# Patient Record
Sex: FEMALE | Race: WHITE | NOT HISPANIC OR LATINO
[De-identification: names, ages, dates, MRNs, and addresses within clinical notes are randomized per-mention and may not be internally consistent; named-entity substitution may affect disease eponyms.]

---

## 2018-07-13 ENCOUNTER — TRANSCRIPTION ENCOUNTER (OUTPATIENT)
Age: 63
End: 2018-07-13

## 2018-07-18 ENCOUNTER — APPOINTMENT (OUTPATIENT)
Dept: FAMILY MEDICINE | Facility: CLINIC | Age: 63
End: 2018-07-18
Payer: COMMERCIAL

## 2018-07-18 ENCOUNTER — NON-APPOINTMENT (OUTPATIENT)
Age: 63
End: 2018-07-18

## 2018-07-18 VITALS
BODY MASS INDEX: 26.7 KG/M2 | HEIGHT: 60 IN | OXYGEN SATURATION: 98 % | SYSTOLIC BLOOD PRESSURE: 180 MMHG | HEART RATE: 89 BPM | WEIGHT: 136 LBS | DIASTOLIC BLOOD PRESSURE: 90 MMHG

## 2018-07-18 VITALS — RESPIRATION RATE: 16 BRPM | SYSTOLIC BLOOD PRESSURE: 180 MMHG | DIASTOLIC BLOOD PRESSURE: 80 MMHG

## 2018-07-18 DIAGNOSIS — Z82.49 FAMILY HISTORY OF ISCHEMIC HEART DISEASE AND OTHER DISEASES OF THE CIRCULATORY SYSTEM: ICD-10-CM

## 2018-07-18 DIAGNOSIS — Z12.11 ENCOUNTER FOR SCREENING FOR MALIGNANT NEOPLASM OF COLON: ICD-10-CM

## 2018-07-18 DIAGNOSIS — Z80.1 FAMILY HISTORY OF MALIGNANT NEOPLASM OF TRACHEA, BRONCHUS AND LUNG: ICD-10-CM

## 2018-07-18 DIAGNOSIS — Z13.0 ENCOUNTER FOR SCREENING FOR OTHER SUSPECTED ENDOCRINE DISORDER: ICD-10-CM

## 2018-07-18 DIAGNOSIS — Z83.3 FAMILY HISTORY OF DIABETES MELLITUS: ICD-10-CM

## 2018-07-18 DIAGNOSIS — Z84.1 FAMILY HISTORY OF DISORDERS OF KIDNEY AND URETER: ICD-10-CM

## 2018-07-18 DIAGNOSIS — E78.1 PURE HYPERGLYCERIDEMIA: ICD-10-CM

## 2018-07-18 DIAGNOSIS — Z13.29 ENCOUNTER FOR SCREENING FOR OTHER SUSPECTED ENDOCRINE DISORDER: ICD-10-CM

## 2018-07-18 DIAGNOSIS — Z13.0 ENCOUNTER FOR SCREENING FOR DISEASES OF THE BLOOD AND BLOOD-FORMING ORGANS AND CERTAIN DISORDERS INVOLVING THE IMMUNE MECHANISM: ICD-10-CM

## 2018-07-18 DIAGNOSIS — E01.0 IODINE-DEFICIENCY RELATED DIFFUSE (ENDEMIC) GOITER: ICD-10-CM

## 2018-07-18 DIAGNOSIS — Z78.9 OTHER SPECIFIED HEALTH STATUS: ICD-10-CM

## 2018-07-18 DIAGNOSIS — Z13.228 ENCOUNTER FOR SCREENING FOR OTHER SUSPECTED ENDOCRINE DISORDER: ICD-10-CM

## 2018-07-18 PROCEDURE — 99406 BEHAV CHNG SMOKING 3-10 MIN: CPT

## 2018-07-18 PROCEDURE — G0444 DEPRESSION SCREEN ANNUAL: CPT

## 2018-07-18 PROCEDURE — 36415 COLL VENOUS BLD VENIPUNCTURE: CPT

## 2018-07-18 PROCEDURE — 99204 OFFICE O/P NEW MOD 45 MIN: CPT | Mod: 25

## 2018-07-18 PROCEDURE — 93000 ELECTROCARDIOGRAM COMPLETE: CPT

## 2018-07-18 NOTE — HEALTH RISK ASSESSMENT
[0] : 2) Feeling down, depressed, or hopeless: Not at all (0) [] : Yes [de-identified] : current smoker, approximately 47 years, currently 1 ppd [de-identified] : occasional alcohol use

## 2018-07-18 NOTE — HISTORY OF PRESENT ILLNESS
[FreeTextEntry1] : Establish care [de-identified] : \par 63 year old female presents as a new patient to establish care. \par \par Went to [facility] on 7/13/18 as she had developed a headache, was found to have elevated BP, 192/98, was sent home to start Benicar 20 mg daily which she has started. Here to establish care, recheck BP. Headache has resolved since yesterday. No change in vision, no chest pain. Has h/o HTN, stopped medications ago as she didn't have insurance.\par \par Reports h/o thyroid nodules, goiter, had been seeing an endocrinologist in the past but wasn't able to follow up due to loss of insurance \par \par Has never had a colonoscopy, hesitant to do so, aware of risks/benefits\par \par Overdue for a mammogram, requests script\par \par Current smoker, approximately 47 year history, 1 ppd\par Aware of risks including risk for lung cancer\par Wants to quit, has tried Wellbutrin but didn't tolerate medication\par Has nicotine patches at home, will look to use\par Interested in lung cancer screening

## 2018-07-18 NOTE — ASSESSMENT
[FreeTextEntry1] : HTN:\par not controlled\par c/w Olmesartan 20 mg (will hold off on increasing to 40 mg at this time as it has only been 6 days since she has been on medication, would wait 2 weeks to increase dose)\par will start low dose Amlodipine 2.5 mg, take as directed\par monitor BP\par see cardiology\par \par Thyromegaly/multiple thyroid nodules:\par check blood work\par check thyroid ultrasound\par see endocrinology\par \par Hypertriglyceridemia:\par reports h/o elevated triglycerides\par will check blood work, reassess \par \par Vitamin D deficiency:\par check blood work\par \par Nicotine Dependence:\par greater than 4 minutes spent discussing smoking cessation\par patient aware of risks\par will try to quit on her own\par agrees for CT lung cancer screening\par \par Health care maintenance:\par check blood work\par see GYN\par go for mammogram\par declines colonoscopy at this time, check fecal globin\par \par return in 1 week for follow up

## 2018-07-18 NOTE — PHYSICAL EXAM
[No Acute Distress] : no acute distress [Well Nourished] : well nourished [Well Developed] : well developed [Well-Appearing] : well-appearing [Normal Sclera/Conjunctiva] : normal sclera/conjunctiva [PERRL] : pupils equal round and reactive to light [Normal Oropharynx] : the oropharynx was normal [Normal TMs] : both tympanic membranes were normal [Normal Appearance] : was normal in appearance [Neck Supple] : was supple [No Respiratory Distress] : no respiratory distress  [Clear to Auscultation] : lungs were clear to auscultation bilaterally [Normal Rate] : normal rate  [Regular Rhythm] : with a regular rhythm [Normal S1, S2] : normal S1 and S2 [No Murmur] : no murmur heard [No Carotid Bruits] : no carotid bruits [Pedal Pulses Present] : the pedal pulses are present [No Edema] : there was no peripheral edema [Soft] : abdomen soft [Non Tender] : non-tender [Normal Bowel Sounds] : normal bowel sounds [Normal Posterior Cervical Nodes] : no posterior cervical lymphadenopathy [Normal Anterior Cervical Nodes] : no anterior cervical lymphadenopathy [No Spinal Tenderness] : no spinal tenderness [Grossly Normal Strength/Tone] : grossly normal strength/tone [No Rash] : no rash [Normal Gait] : normal gait [No Focal Deficits] : no focal deficits [Normal Affect] : the affect was normal [Alert and Oriented x3] : oriented to person, place, and time [Normal Mood] : the mood was normal [de-identified] : thyromegaly, right>left

## 2018-07-18 NOTE — COUNSELING
[ - Annual Depression Screening] : Annual Depression Screening [Tobacco Use Cessation Intermediate Greater Than 3 Minutes Up to 10 Minutes] : Tobacco Use Cessation Intermediate Greater Than 3 Minutes Up to 10 Minutes

## 2018-07-20 LAB
25(OH)D3 SERPL-MCNC: 24.5 NG/ML
ALBUMIN SERPL ELPH-MCNC: 4.5 G/DL
ALP BLD-CCNC: 79 U/L
ALT SERPL-CCNC: 28 U/L
ANION GAP SERPL CALC-SCNC: 15 MMOL/L
APPEARANCE: CLEAR
AST SERPL-CCNC: 28 U/L
BACTERIA: NEGATIVE
BASOPHILS # BLD AUTO: 0.02 K/UL
BASOPHILS NFR BLD AUTO: 0.2 %
BILIRUB SERPL-MCNC: 0.4 MG/DL
BILIRUBIN URINE: NEGATIVE
BLOOD URINE: NEGATIVE
BUN SERPL-MCNC: 11 MG/DL
CALCIUM SERPL-MCNC: 9.5 MG/DL
CHLORIDE SERPL-SCNC: 101 MMOL/L
CHOLEST SERPL-MCNC: 226 MG/DL
CHOLEST/HDLC SERPL: 4.4 RATIO
CO2 SERPL-SCNC: 25 MMOL/L
COLOR: YELLOW
CREAT SERPL-MCNC: 0.68 MG/DL
EOSINOPHIL # BLD AUTO: 0.24 K/UL
EOSINOPHIL NFR BLD AUTO: 2.7 %
FERRITIN SERPL-MCNC: 237 NG/ML
FOLATE SERPL-MCNC: 14.9 NG/ML
GLUCOSE QUALITATIVE U: NEGATIVE MG/DL
GLUCOSE SERPL-MCNC: 99 MG/DL
HBA1C MFR BLD HPLC: 5.9 %
HCT VFR BLD CALC: 47.6 %
HDLC SERPL-MCNC: 51 MG/DL
HGB BLD-MCNC: 15.4 G/DL
HYALINE CASTS: 2 /LPF
IMM GRANULOCYTES NFR BLD AUTO: 0.3 %
IRON SATN MFR SERPL: 32 %
IRON SERPL-MCNC: 104 UG/DL
KETONES URINE: NEGATIVE
LDLC SERPL CALC-MCNC: 137 MG/DL
LEUKOCYTE ESTERASE URINE: NEGATIVE
LYMPHOCYTES # BLD AUTO: 2.27 K/UL
LYMPHOCYTES NFR BLD AUTO: 25.7 %
MAN DIFF?: NORMAL
MCHC RBC-ENTMCNC: 29.8 PG
MCHC RBC-ENTMCNC: 32.4 GM/DL
MCV RBC AUTO: 92.1 FL
MICROSCOPIC-UA: NORMAL
MONOCYTES # BLD AUTO: 0.55 K/UL
MONOCYTES NFR BLD AUTO: 6.2 %
NEUTROPHILS # BLD AUTO: 5.72 K/UL
NEUTROPHILS NFR BLD AUTO: 64.9 %
NITRITE URINE: NEGATIVE
PH URINE: 6.5
PLATELET # BLD AUTO: 281 K/UL
POTASSIUM SERPL-SCNC: 4.4 MMOL/L
PROT SERPL-MCNC: 7.5 G/DL
PROTEIN URINE: NEGATIVE MG/DL
RBC # BLD: 5.17 M/UL
RBC # FLD: 14.6 %
RED BLOOD CELLS URINE: 0 /HPF
SODIUM SERPL-SCNC: 141 MMOL/L
SPECIFIC GRAVITY URINE: 1.01
SQUAMOUS EPITHELIAL CELLS: 0 /HPF
T3FREE SERPL-MCNC: 4.65 PG/ML
T4 FREE SERPL-MCNC: 1.2 NG/DL
THYROGLOB AB SERPL-ACNC: <20 IU/ML
THYROPEROXIDASE AB SERPL IA-ACNC: <10 IU/ML
TIBC SERPL-MCNC: 321 UG/DL
TRIGL SERPL-MCNC: 191 MG/DL
TSH SERPL-ACNC: <0.01 UIU/ML
UIBC SERPL-MCNC: 217 UG/DL
URATE SERPL-MCNC: 5.5 MG/DL
UROBILINOGEN URINE: NEGATIVE MG/DL
VIT B12 SERPL-MCNC: 556 PG/ML
WBC # FLD AUTO: 8.83 K/UL
WHITE BLOOD CELLS URINE: 0 /HPF

## 2018-07-24 ENCOUNTER — APPOINTMENT (OUTPATIENT)
Dept: FAMILY MEDICINE | Facility: CLINIC | Age: 63
End: 2018-07-24
Payer: COMMERCIAL

## 2018-07-24 VITALS
WEIGHT: 136 LBS | OXYGEN SATURATION: 97 % | SYSTOLIC BLOOD PRESSURE: 180 MMHG | BODY MASS INDEX: 26.7 KG/M2 | HEART RATE: 88 BPM | DIASTOLIC BLOOD PRESSURE: 80 MMHG | RESPIRATION RATE: 16 BRPM | HEIGHT: 60 IN

## 2018-07-24 VITALS — DIASTOLIC BLOOD PRESSURE: 80 MMHG | SYSTOLIC BLOOD PRESSURE: 150 MMHG

## 2018-07-24 DIAGNOSIS — E55.9 VITAMIN D DEFICIENCY, UNSPECIFIED: ICD-10-CM

## 2018-07-24 PROCEDURE — 99214 OFFICE O/P EST MOD 30 MIN: CPT

## 2018-07-24 RX ORDER — AMLODIPINE BESYLATE 2.5 MG/1
2.5 TABLET ORAL DAILY
Qty: 30 | Refills: 1 | Status: DISCONTINUED | COMMUNITY
Start: 2018-07-18 | End: 2018-07-24

## 2018-07-24 RX ORDER — OLMESARTAN MEDOXOMIL 20 MG/1
20 TABLET, FILM COATED ORAL
Refills: 0 | Status: DISCONTINUED | COMMUNITY
End: 2018-07-24

## 2018-07-24 NOTE — HISTORY OF PRESENT ILLNESS
[FreeTextEntry1] : Follow up [de-identified] : \par 63 year old female presents for follow up\par \par Has HTN, on Olmesartan 20 mg daily\par Tried Amlodipine for 3 days, didn't tolerate it, felt anxious with the medication, developed chest tightness\par reports symptoms resolved after she stopped taking the Amlodipine\par No longer with headaches, feeling better\par Has appointment with cardiology tomorrow\par \par Will be trying to quit smoking, has bought Nicorette gum\par \par Has scheduled mammogram, CT lung cancer screening

## 2018-07-24 NOTE — PHYSICAL EXAM
[No Acute Distress] : no acute distress [Well Nourished] : well nourished [Well Developed] : well developed [Well-Appearing] : well-appearing [Normal Appearance] : was normal in appearance [Neck Supple] : was supple [No Respiratory Distress] : no respiratory distress  [Clear to Auscultation] : lungs were clear to auscultation bilaterally [Normal Rhythm/Effort] : normal respiratory rhythm and effort [Normal Rate] : normal rate  [Regular Rhythm] : with a regular rhythm [Normal S1, S2] : normal S1 and S2 [No Murmur] : no murmur heard [No Edema] : there was no peripheral edema [Normal Anterior Cervical Nodes] : no anterior cervical lymphadenopathy [Alert and Oriented x3] : oriented to person, place, and time [Normal Oropharynx] : the oropharynx was normal [Normal Gait] : normal gait

## 2018-07-24 NOTE — REVIEW OF SYSTEMS
[Fever] : no fever [Chills] : no chills [Chest Pain] : no chest pain [Shortness Of Breath] : no shortness of breath [Cough] : no cough [Headache] : no headache

## 2018-07-24 NOTE — ASSESSMENT
[FreeTextEntry1] : HTN:\par BP improved upon recheck\par will increase Olmesartan to 40 mg daily\par has upcoming cardiology evaluation \par \par Hyperthyroidism:\par see endocrinology\par \par Hyperlipidemia:\par focus on diet and exercise as tolerated\par monitor, if no improvement, would suggest starting medication \par \par Vitamin D deficiency:\par Vitamin D3 1000 units daily\par \par Prediabetes:\par focus on diet and exercise as tolerated\par \par return as needed and follow up in 1 month\par \par

## 2018-07-25 ENCOUNTER — APPOINTMENT (OUTPATIENT)
Dept: CARDIOLOGY | Facility: CLINIC | Age: 63
End: 2018-07-25
Payer: COMMERCIAL

## 2018-07-25 ENCOUNTER — NON-APPOINTMENT (OUTPATIENT)
Age: 63
End: 2018-07-25

## 2018-07-25 VITALS
BODY MASS INDEX: 26.7 KG/M2 | DIASTOLIC BLOOD PRESSURE: 89 MMHG | OXYGEN SATURATION: 97 % | HEART RATE: 90 BPM | SYSTOLIC BLOOD PRESSURE: 157 MMHG | WEIGHT: 136 LBS | HEIGHT: 60 IN

## 2018-07-25 PROCEDURE — 99204 OFFICE O/P NEW MOD 45 MIN: CPT

## 2018-07-25 PROCEDURE — 99406 BEHAV CHNG SMOKING 3-10 MIN: CPT

## 2018-07-25 PROCEDURE — 93000 ELECTROCARDIOGRAM COMPLETE: CPT

## 2018-07-26 ENCOUNTER — FORM ENCOUNTER (OUTPATIENT)
Age: 63
End: 2018-07-26

## 2018-07-27 ENCOUNTER — APPOINTMENT (OUTPATIENT)
Dept: MAMMOGRAPHY | Facility: CLINIC | Age: 63
End: 2018-07-27
Payer: COMMERCIAL

## 2018-07-27 ENCOUNTER — APPOINTMENT (OUTPATIENT)
Dept: CT IMAGING | Facility: CLINIC | Age: 63
End: 2018-07-27
Payer: COMMERCIAL

## 2018-07-27 ENCOUNTER — OUTPATIENT (OUTPATIENT)
Dept: OUTPATIENT SERVICES | Facility: HOSPITAL | Age: 63
LOS: 1 days | End: 2018-07-27
Payer: COMMERCIAL

## 2018-07-27 ENCOUNTER — TRANSCRIPTION ENCOUNTER (OUTPATIENT)
Age: 63
End: 2018-07-27

## 2018-07-27 DIAGNOSIS — Z00.8 ENCOUNTER FOR OTHER GENERAL EXAMINATION: ICD-10-CM

## 2018-07-27 DIAGNOSIS — F17.200 NICOTINE DEPENDENCE, UNSPECIFIED, UNCOMPLICATED: ICD-10-CM

## 2018-07-27 PROCEDURE — 77063 BREAST TOMOSYNTHESIS BI: CPT | Mod: 26

## 2018-07-27 PROCEDURE — 77063 BREAST TOMOSYNTHESIS BI: CPT

## 2018-07-27 PROCEDURE — 77067 SCR MAMMO BI INCL CAD: CPT | Mod: 26

## 2018-07-27 PROCEDURE — G0297: CPT

## 2018-07-27 PROCEDURE — G0297: CPT | Mod: 26

## 2018-07-27 PROCEDURE — 77067 SCR MAMMO BI INCL CAD: CPT

## 2018-08-01 ENCOUNTER — APPOINTMENT (OUTPATIENT)
Dept: CARDIOLOGY | Facility: CLINIC | Age: 63
End: 2018-08-01
Payer: COMMERCIAL

## 2018-08-01 ENCOUNTER — RESULT REVIEW (OUTPATIENT)
Age: 63
End: 2018-08-01

## 2018-08-01 PROCEDURE — 93306 TTE W/DOPPLER COMPLETE: CPT

## 2018-08-15 ENCOUNTER — APPOINTMENT (OUTPATIENT)
Dept: CARDIOLOGY | Facility: CLINIC | Age: 63
End: 2018-08-15

## 2018-08-20 ENCOUNTER — APPOINTMENT (OUTPATIENT)
Dept: CARDIOLOGY | Facility: CLINIC | Age: 63
End: 2018-08-20

## 2019-01-09 ENCOUNTER — TRANSCRIPTION ENCOUNTER (OUTPATIENT)
Age: 64
End: 2019-01-09

## 2019-01-09 ENCOUNTER — RX CHANGE (OUTPATIENT)
Age: 64
End: 2019-01-09

## 2019-01-15 ENCOUNTER — APPOINTMENT (OUTPATIENT)
Dept: FAMILY MEDICINE | Facility: CLINIC | Age: 64
End: 2019-01-15
Payer: MEDICARE

## 2019-01-15 VITALS
OXYGEN SATURATION: 98 % | DIASTOLIC BLOOD PRESSURE: 80 MMHG | SYSTOLIC BLOOD PRESSURE: 162 MMHG | HEART RATE: 86 BPM | HEIGHT: 61 IN | BODY MASS INDEX: 24.92 KG/M2 | WEIGHT: 132 LBS | TEMPERATURE: 98.2 F

## 2019-01-15 VITALS — RESPIRATION RATE: 16 BRPM | SYSTOLIC BLOOD PRESSURE: 158 MMHG | DIASTOLIC BLOOD PRESSURE: 80 MMHG

## 2019-01-15 PROCEDURE — 99214 OFFICE O/P EST MOD 30 MIN: CPT

## 2019-01-15 RX ORDER — OLMESARTAN MEDOXOMIL 20 MG/1
20 TABLET, FILM COATED ORAL
Refills: 0 | Status: COMPLETED | COMMUNITY

## 2019-01-15 NOTE — PHYSICAL EXAM
[No Acute Distress] : no acute distress [Well Nourished] : well nourished [Well Developed] : well developed [Well-Appearing] : well-appearing [Normal Oropharynx] : the oropharynx was normal [Normal TMs] : both tympanic membranes were normal [Normal Appearance] : was normal in appearance [Neck Supple] : was supple [No Respiratory Distress] : no respiratory distress  [Clear to Auscultation] : lungs were clear to auscultation bilaterally [Normal Rate] : normal rate  [Regular Rhythm] : with a regular rhythm [Normal S1, S2] : normal S1 and S2 [No Murmur] : no murmur heard [No Edema] : there was no peripheral edema [Normal Anterior Cervical Nodes] : no anterior cervical lymphadenopathy [Alert and Oriented x3] : oriented to person, place, and time [de-identified] : no sinus tenderness; no TMJ tenderness

## 2019-01-15 NOTE — HISTORY OF PRESENT ILLNESS
[FreeTextEntry1] : Follow up [de-identified] : \par 63 year old female presents for follow up\par \par Has HTN, on Olmesartan 40 mg daily\par Seen by cardiology in July 2018\par Here for BP check\par will be leaving for [place] soon and wanted to be seen prior to leaving\par \par c/o right sided maxillary sinus pressure\par started on [place] Anastasiia and lasted a day, was doing fine until 3 days ago\par Taking Tylenol with some relief \par occasional right ear pain\par no fever\par does admit will be needing dental work

## 2019-01-15 NOTE — ASSESSMENT
[FreeTextEntry1] : BP not controlled\par will look to add Hctz 12.5 mg daily in addition to Olmesartan 40 mg daily\par patient is awaiting to have medical coverage for prescriptions- will be ready by Feb 1, 2019\par will call back at that time and scripts will be sent \par \par right sided sinus pain possibly dental in etiology\par no sign of sinus infection \par to f/u with dentist- patient will look to see one in [place] \par \par Hyperlipidemia- focus on diet and exercise

## 2019-02-01 ENCOUNTER — RX CHANGE (OUTPATIENT)
Age: 64
End: 2019-02-01

## 2019-04-12 ENCOUNTER — TRANSCRIPTION ENCOUNTER (OUTPATIENT)
Age: 64
End: 2019-04-12

## 2019-05-29 ENCOUNTER — RX CHANGE (OUTPATIENT)
Age: 64
End: 2019-05-29

## 2019-05-30 ENCOUNTER — RX RENEWAL (OUTPATIENT)
Age: 64
End: 2019-05-30

## 2019-05-30 ENCOUNTER — TRANSCRIPTION ENCOUNTER (OUTPATIENT)
Age: 64
End: 2019-05-30

## 2019-08-14 ENCOUNTER — TRANSCRIPTION ENCOUNTER (OUTPATIENT)
Age: 64
End: 2019-08-14

## 2019-08-22 ENCOUNTER — APPOINTMENT (OUTPATIENT)
Dept: FAMILY MEDICINE | Facility: CLINIC | Age: 64
End: 2019-08-22
Payer: MEDICARE

## 2019-08-22 VITALS
RESPIRATION RATE: 16 BRPM | SYSTOLIC BLOOD PRESSURE: 160 MMHG | WEIGHT: 132 LBS | OXYGEN SATURATION: 97 % | BODY MASS INDEX: 24.92 KG/M2 | DIASTOLIC BLOOD PRESSURE: 80 MMHG | HEART RATE: 87 BPM | HEIGHT: 61 IN

## 2019-08-22 PROCEDURE — 99213 OFFICE O/P EST LOW 20 MIN: CPT | Mod: 25

## 2019-08-22 PROCEDURE — G0444 DEPRESSION SCREEN ANNUAL: CPT | Mod: 59

## 2019-08-22 NOTE — HISTORY OF PRESENT ILLNESS
[FreeTextEntry1] : htn [de-identified] : history of htn losartan was increased to 100 still not at goal neg headache chest pain palpitatons or dyspnea

## 2019-09-17 ENCOUNTER — APPOINTMENT (OUTPATIENT)
Dept: FAMILY MEDICINE | Facility: CLINIC | Age: 64
End: 2019-09-17

## 2019-11-05 ENCOUNTER — APPOINTMENT (OUTPATIENT)
Dept: FAMILY MEDICINE | Facility: CLINIC | Age: 64
End: 2019-11-05
Payer: MEDICARE

## 2019-11-05 VITALS — SYSTOLIC BLOOD PRESSURE: 130 MMHG | DIASTOLIC BLOOD PRESSURE: 60 MMHG

## 2019-11-05 VITALS
OXYGEN SATURATION: 98 % | HEIGHT: 60 IN | HEART RATE: 93 BPM | DIASTOLIC BLOOD PRESSURE: 82 MMHG | BODY MASS INDEX: 25.72 KG/M2 | SYSTOLIC BLOOD PRESSURE: 130 MMHG | WEIGHT: 131 LBS

## 2019-11-05 DIAGNOSIS — Z12.83 ENCOUNTER FOR SCREENING FOR MALIGNANT NEOPLASM OF SKIN: ICD-10-CM

## 2019-11-05 DIAGNOSIS — Z12.39 ENCOUNTER FOR OTHER SCREENING FOR MALIGNANT NEOPLASM OF BREAST: ICD-10-CM

## 2019-11-05 PROCEDURE — 99406 BEHAV CHNG SMOKING 3-10 MIN: CPT

## 2019-11-05 PROCEDURE — G0402 INITIAL PREVENTIVE EXAM: CPT

## 2019-11-05 PROCEDURE — 99214 OFFICE O/P EST MOD 30 MIN: CPT | Mod: 25

## 2019-11-05 RX ORDER — OLMESARTAN MEDOXOMIL 40 MG/1
40 TABLET, FILM COATED ORAL DAILY
Qty: 90 | Refills: 0 | Status: DISCONTINUED | COMMUNITY
Start: 2018-07-24 | End: 2019-11-05

## 2019-11-05 RX ORDER — LOSARTAN POTASSIUM 100 MG/1
100 TABLET, FILM COATED ORAL DAILY
Qty: 90 | Refills: 0 | Status: DISCONTINUED | COMMUNITY
Start: 2019-05-29 | End: 2019-11-05

## 2019-11-05 NOTE — PHYSICAL EXAM
[No Acute Distress] : no acute distress [Well Nourished] : well nourished [Well Developed] : well developed [Normal Sclera/Conjunctiva] : normal sclera/conjunctiva [PERRL] : pupils equal round and reactive to light [Normal Oropharynx] : the oropharynx was normal [Normal TMs] : both tympanic membranes were normal [Normal Appearance] : was normal in appearance [Neck Supple] : was supple [Enlarged Right] : was enlarged on the right side [No Respiratory Distress] : no respiratory distress  [Clear to Auscultation] : lungs were clear to auscultation bilaterally [Normal Rate] : normal rate  [Regular Rhythm] : with a regular rhythm [Normal S1, S2] : normal S1 and S2 [No Murmur] : no murmur heard [No Carotid Bruits] : no carotid bruits [No Edema] : there was no peripheral edema [Soft] : abdomen soft [Non Tender] : non-tender [Normal Bowel Sounds] : normal bowel sounds [Normal Posterior Cervical Nodes] : no posterior cervical lymphadenopathy [Normal Anterior Cervical Nodes] : no anterior cervical lymphadenopathy [No Spinal Tenderness] : no spinal tenderness [Grossly Normal Strength/Tone] : grossly normal strength/tone [No Rash] : no rash [No Focal Deficits] : no focal deficits [Alert and Oriented x3] : oriented to person, place, and time

## 2019-11-07 NOTE — COUNSELING
[Risk of tobacco use and health benefits of smoking cessation discussed] : Risk of tobacco use and health benefits of smoking cessation discussed [Cessation strategies including cessation program discussed] : Cessation strategies including cessation program discussed [Willing to Quit Smoking] : Willing to quit smoking [Tobacco Use Cessation Intermediate Greater Than 3 Minutes Up to 10 Minutes] : Tobacco Use Cessation Intermediate Greater Than 3 Minutes Up to 10 Minutes [FreeTextEntry1] : 4

## 2019-11-07 NOTE — HISTORY OF PRESENT ILLNESS
[FreeTextEntry1] : Annual physical  [de-identified] : \par 64 year old female presents for annual physical. \par \par Has HTN- on Losartan-Hctz 100-25 mg daily\par \par Has Hyperthyroidism- h/o goiter/thyroid nodules\par did not do updated thyroid ultrasound\par willing to f/u with endocrinology \par \par current smoker- 48 year pack history (48 years x 1 ppd)\par wants to quit- aware of risks\par due for updated CT lung cancer screening\par \par due for mammogram\par \par refuses colonoscopy \par \par declines Flu vaccine\par \par will look to f/u with cardiology

## 2019-11-07 NOTE — HEALTH RISK ASSESSMENT
[] : Yes [30 or more] : 30 or more [Yes] : Yes [Monthly or less (1 pt)] : Monthly or less (1 point) [1 or 2 (0 pts)] : 1 or 2 (0 points) [Never (0 pts)] : Never (0 points) [No] : In the past 12 months have you used drugs other than those required for medical reasons? No [No falls in past year] : Patient reported no falls in the past year [0] : 2) Feeling down, depressed, or hopeless: Not at all (0) [2] : 2 [S] : S [Patient reported mammogram was normal] : Patient reported mammogram was normal [Patient declined colonoscopy] : Patient declined colonoscopy [With Family] : lives with family [Employed] : employed [] :  [Feels Safe at Home] : Feels safe at home [Fully functional (bathing, dressing, toileting, transferring, walking, feeding)] : Fully functional (bathing, dressing, toileting, transferring, walking, feeding) [Fully functional (using the telephone, shopping, preparing meals, housekeeping, doing laundry, using] : Fully functional and needs no help or supervision to perform IADLs (using the telephone, shopping, preparing meals, housekeeping, doing laundry, using transportation, managing medications and managing finances) [Reports normal functional visual acuity (ie: able to read med bottle)] : Reports normal functional visual acuity [Reports changes in dental health] : Reports changes in dental health [Smoke Detector] : smoke detector [Carbon Monoxide Detector] : carbon monoxide detector [Seat Belt] :  uses seat belt [Sunscreen] : uses sunscreen [With Patient/Caregiver] : With Patient/Caregiver [de-identified] : 48 years x 1 pack per day [Audit-CScore] : 1 [de-identified] : active person [de-identified] : diet can be improved [LowDoseCTScan] : 07/18 [Change in mental status noted] : No change in mental status noted [Language] : denies difficulty with language [Behavior] : denies difficulty with behavior [Handling Complex Tasks] : denies difficulty handling complex tasks [Reports changes in hearing] : Reports no changes in hearing [Reports changes in vision] : Reports no changes in vision [MammogramDate] : 07/18 [de-identified] : daughter, son-in-law, grandchildren [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

## 2019-11-07 NOTE — ASSESSMENT
[FreeTextEntry1] : Health maintenance:\par check blood work \par follow up with optometry/ophthalmology and dentist for routine exams\par see GYN\par go for mammogram, bone density \par refuses colonoscopy- check FIT DNA testing (Cologuard)\par c/w diet and exercise as tolerated\par declines Flu vaccine\par \par HTN:\par stable\par c/w Losartan-Hctz\par \par Hyperlipidemia:\par previously not controlled\par c/w diet and exercise\par check blood work\par \par Hyperthyroidism/Multinodular goiter:\par check blood work\par check thyroid ultrasound\par see endocrinology \par \par Prediabetes:\par c/w diet and exercise\par check blood work\par \par Nicotine Dependence:\par 4 minutes spent with patient discussing smoking cessation, patient aware of risks and wants to quit\par trial of Chantix \par go for f/u CT lung cancer screening \par \par advised to f/u with cardiology

## 2019-11-12 VITALS — WEIGHT: 130 LBS | BODY MASS INDEX: 25.52 KG/M2 | HEIGHT: 60 IN

## 2019-11-12 NOTE — PLAN
[FreeTextEntry1] : - Low Dose CT chest for lung cancer screening.\par \par - Encouraged smoking cessation\par \par - Start chantix\par \par Engaged in share decision making with Ms. EPPERSON.  Answered all questions.  She verbalized understanding and agreement.  She knows to call back with any questions or concerns.\par

## 2019-11-12 NOTE — DATA REVIEWED
[Lung Cancer Screening] : Patient underwent lung cancer screening [2] : 2 [S] : S [TextBox_12] : 07/18

## 2019-11-12 NOTE — REASON FOR VISIT
[Annual Follow-Up] : an annual follow-up visit [Low-Dose CT Screening Discussion] : low-dose CT lung cancer screening discussion [Virtual Visit] : virtual visit

## 2019-11-12 NOTE — HISTORY OF PRESENT ILLNESS
[TextBox_13] : Referred by Dr. Tiffanie Patton.\par \par Ms. EPPERSON is a 64 year year old female with a history of HTN and emphysema.\par \par She was called today to review eligibility for Low-Dose CT lung cancer screening.  Reviewed and confirmed that the patient meets screening eligibility criteria:\par \par 64 years old \par \par Smoking Status: Current Smoker\par \par Number of pack(s) per day: 18 cigarettes daily\par Number of years smoked: 48\par Number of pack years smokin\par \par Ms. EPPERSON denies any symptoms of lung cancer, including new cough, change in cough, hemoptysis, and unintentional weight loss.\par \par Ms. EPPERSON denies any personal history of lung cancer.  Admits to lung cancer in two first degree relatives, her mother and brother.  Denies any history of lung disease or any history of occupational exposures.

## 2019-11-14 ENCOUNTER — FORM ENCOUNTER (OUTPATIENT)
Age: 64
End: 2019-11-14

## 2019-11-15 ENCOUNTER — APPOINTMENT (OUTPATIENT)
Dept: CT IMAGING | Facility: CLINIC | Age: 64
End: 2019-11-15
Payer: MEDICARE

## 2019-11-15 ENCOUNTER — APPOINTMENT (OUTPATIENT)
Dept: MAMMOGRAPHY | Facility: CLINIC | Age: 64
End: 2019-11-15
Payer: MEDICARE

## 2019-11-15 ENCOUNTER — OUTPATIENT (OUTPATIENT)
Dept: OUTPATIENT SERVICES | Facility: HOSPITAL | Age: 64
LOS: 1 days | End: 2019-11-15
Payer: MEDICARE

## 2019-11-15 DIAGNOSIS — Z12.39 ENCOUNTER FOR OTHER SCREENING FOR MALIGNANT NEOPLASM OF BREAST: ICD-10-CM

## 2019-11-15 DIAGNOSIS — F17.200 NICOTINE DEPENDENCE, UNSPECIFIED, UNCOMPLICATED: ICD-10-CM

## 2019-11-15 LAB
25(OH)D3 SERPL-MCNC: 39.8 NG/ML
ALBUMIN SERPL ELPH-MCNC: 4.3 G/DL
ALP BLD-CCNC: 77 U/L
ALT SERPL-CCNC: 13 U/L
ANION GAP SERPL CALC-SCNC: 13 MMOL/L
APPEARANCE: CLEAR
AST SERPL-CCNC: 12 U/L
BACTERIA: NEGATIVE
BASOPHILS # BLD AUTO: 0.04 K/UL
BASOPHILS NFR BLD AUTO: 0.5 %
BILIRUB SERPL-MCNC: 0.4 MG/DL
BILIRUBIN URINE: NEGATIVE
BLOOD URINE: NORMAL
BUN SERPL-MCNC: 13 MG/DL
CALCIUM SERPL-MCNC: 9.1 MG/DL
CHLORIDE SERPL-SCNC: 99 MMOL/L
CHOLEST SERPL-MCNC: 213 MG/DL
CHOLEST/HDLC SERPL: 4.4 RATIO
CO2 SERPL-SCNC: 28 MMOL/L
COLOR: YELLOW
CREAT SERPL-MCNC: 0.7 MG/DL
EOSINOPHIL # BLD AUTO: 0.13 K/UL
EOSINOPHIL NFR BLD AUTO: 1.6 %
ESTIMATED AVERAGE GLUCOSE: 126 MG/DL
FOLATE SERPL-MCNC: 14 NG/ML
GLUCOSE QUALITATIVE U: NEGATIVE
GLUCOSE SERPL-MCNC: 108 MG/DL
HBA1C MFR BLD HPLC: 6 %
HCT VFR BLD CALC: 41.8 %
HDLC SERPL-MCNC: 49 MG/DL
HGB BLD-MCNC: 13.6 G/DL
HYALINE CASTS: 0 /LPF
IMM GRANULOCYTES NFR BLD AUTO: 0.3 %
KETONES URINE: NEGATIVE
LDLC SERPL CALC-MCNC: 133 MG/DL
LEUKOCYTE ESTERASE URINE: ABNORMAL
LYMPHOCYTES # BLD AUTO: 2.83 K/UL
LYMPHOCYTES NFR BLD AUTO: 35.9 %
MAN DIFF?: NORMAL
MCHC RBC-ENTMCNC: 29.2 PG
MCHC RBC-ENTMCNC: 32.5 GM/DL
MCV RBC AUTO: 89.9 FL
MICROSCOPIC-UA: NORMAL
MONOCYTES # BLD AUTO: 0.63 K/UL
MONOCYTES NFR BLD AUTO: 8 %
NEUTROPHILS # BLD AUTO: 4.23 K/UL
NEUTROPHILS NFR BLD AUTO: 53.7 %
NITRITE URINE: NEGATIVE
PH URINE: 7
PLATELET # BLD AUTO: 317 K/UL
POTASSIUM SERPL-SCNC: 3.9 MMOL/L
PROT SERPL-MCNC: 6.5 G/DL
PROTEIN URINE: NORMAL
RBC # BLD: 4.65 M/UL
RBC # FLD: 13.5 %
RED BLOOD CELLS URINE: 2 /HPF
SODIUM SERPL-SCNC: 140 MMOL/L
SPECIFIC GRAVITY URINE: 1.02
SQUAMOUS EPITHELIAL CELLS: 9 /HPF
T3FREE SERPL-MCNC: 4.42 PG/ML
T4 FREE SERPL-MCNC: 1.2 NG/DL
TRIGL SERPL-MCNC: 157 MG/DL
TSH SERPL-ACNC: 0.01 UIU/ML
URATE SERPL-MCNC: 6 MG/DL
UROBILINOGEN URINE: ABNORMAL
VIT B12 SERPL-MCNC: 431 PG/ML
WBC # FLD AUTO: 7.88 K/UL
WHITE BLOOD CELLS URINE: 7 /HPF

## 2019-11-15 PROCEDURE — 77063 BREAST TOMOSYNTHESIS BI: CPT | Mod: 26

## 2019-11-15 PROCEDURE — G0297: CPT

## 2019-11-15 PROCEDURE — 77067 SCR MAMMO BI INCL CAD: CPT

## 2019-11-15 PROCEDURE — 77067 SCR MAMMO BI INCL CAD: CPT | Mod: 26

## 2019-11-15 PROCEDURE — 77063 BREAST TOMOSYNTHESIS BI: CPT

## 2019-11-15 PROCEDURE — G0297: CPT | Mod: 26

## 2020-05-08 ENCOUNTER — TRANSCRIPTION ENCOUNTER (OUTPATIENT)
Age: 65
End: 2020-05-08

## 2020-05-13 DIAGNOSIS — R91.8 OTHER NONSPECIFIC ABNORMAL FINDING OF LUNG FIELD: ICD-10-CM

## 2020-05-29 ENCOUNTER — APPOINTMENT (OUTPATIENT)
Dept: CT IMAGING | Facility: CLINIC | Age: 65
End: 2020-05-29
Payer: MEDICARE

## 2020-05-29 ENCOUNTER — OUTPATIENT (OUTPATIENT)
Dept: OUTPATIENT SERVICES | Facility: HOSPITAL | Age: 65
LOS: 1 days | End: 2020-05-29
Payer: MEDICARE

## 2020-05-29 DIAGNOSIS — F17.200 NICOTINE DEPENDENCE, UNSPECIFIED, UNCOMPLICATED: ICD-10-CM

## 2020-05-29 DIAGNOSIS — R91.8 OTHER NONSPECIFIC ABNORMAL FINDING OF LUNG FIELD: ICD-10-CM

## 2020-05-29 PROCEDURE — 71250 CT THORAX DX C-: CPT

## 2020-05-29 PROCEDURE — 71250 CT THORAX DX C-: CPT | Mod: 26

## 2020-11-09 ENCOUNTER — NON-APPOINTMENT (OUTPATIENT)
Age: 65
End: 2020-11-09

## 2020-11-09 ENCOUNTER — APPOINTMENT (OUTPATIENT)
Dept: FAMILY MEDICINE | Facility: CLINIC | Age: 65
End: 2020-11-09
Payer: MEDICARE

## 2020-11-09 VITALS
RESPIRATION RATE: 16 BRPM | DIASTOLIC BLOOD PRESSURE: 80 MMHG | OXYGEN SATURATION: 98 % | TEMPERATURE: 97.5 F | HEART RATE: 88 BPM | BODY MASS INDEX: 25.72 KG/M2 | WEIGHT: 131 LBS | SYSTOLIC BLOOD PRESSURE: 144 MMHG | HEIGHT: 60 IN

## 2020-11-09 VITALS — DIASTOLIC BLOOD PRESSURE: 60 MMHG | SYSTOLIC BLOOD PRESSURE: 140 MMHG

## 2020-11-09 DIAGNOSIS — E04.2 NONTOXIC MULTINODULAR GOITER: ICD-10-CM

## 2020-11-09 DIAGNOSIS — Z20.828 CONTACT WITH AND (SUSPECTED) EXPOSURE TO OTHER VIRAL COMMUNICABLE DISEASES: ICD-10-CM

## 2020-11-09 PROCEDURE — 93000 ELECTROCARDIOGRAM COMPLETE: CPT | Mod: 59

## 2020-11-09 PROCEDURE — 99214 OFFICE O/P EST MOD 30 MIN: CPT | Mod: 25

## 2020-11-09 PROCEDURE — G0438: CPT

## 2020-11-09 PROCEDURE — 99072 ADDL SUPL MATRL&STAF TM PHE: CPT

## 2020-11-09 RX ORDER — VARENICLINE TARTRATE 0.5 (11)-1
0.5 MG X 11 & KIT ORAL
Qty: 1 | Refills: 1 | Status: DISCONTINUED | COMMUNITY
Start: 2019-11-05 | End: 2020-11-09

## 2020-11-09 RX ORDER — ATORVASTATIN CALCIUM 10 MG/1
10 TABLET, FILM COATED ORAL DAILY
Qty: 90 | Refills: 1 | Status: DISCONTINUED | COMMUNITY
Start: 2019-11-15 | End: 2020-11-09

## 2020-11-09 NOTE — ASSESSMENT
[FreeTextEntry1] : Health care maintenance:\par check blood work, blood drawn in office\par follow up with optometry/ophthalmology and dentist for routine exams when due \par follow up with GYN\par go for mammogram, bone density \par refuses colonoscopy- check FIT DNA testing (Cologuard)\par c/w diet and exercise as tolerated\par declines Flu vaccine\par declines Pneumovax\par \par HTN:\par stable\par c/w Losartan-Hctz\par follow up with cardiology \par \par Hyperlipidemia:\par c/w diet and exercise as tolerated\par declines medication \par check blood work\par follow up with cardiology \par \par Hyperthyroidism/Multinodular goiter:\par check blood work\par see endocrinology \par \par Prediabetes:\par c/w diet and exercise as tolerated\par check blood work\par \par Cigarette Nicotine Dependence:\par has cut back on cigarettes\par patient aware of risks- looking to quit on her own\par up to date with CT lung cancer screening \par \par advised to f/u with cardiology

## 2020-11-09 NOTE — HISTORY OF PRESENT ILLNESS
[FreeTextEntry1] : Annual physical  [de-identified] : \par 65 year old female presents for annual physical. \par \par Has HTN- on Losartan-Hctz 100-25 mg daily\par \par Has Hyperlipidemia \par Didn't take Atorvastatin- hesitant to go on medication \par \par Has Hyperthyroidism- h/o goiter/thyroid nodules\par due to see endocrinology\par \par current smoker- 49 years- cut back to 1/2 pack per day (used to smoke 1 pack per day)\par wants to quit- aware of risks\par didn't try Chantix and does not want to \par has Nicorette gum\par up to date with CT lung cancer screening \par \par due for mammogram\par \par refuses colonoscopy \par agrees to Cologuard\par \par declines Flu vaccine\par declines Pneumovax

## 2020-11-09 NOTE — HEALTH RISK ASSESSMENT
[] : Yes [30 or more] : 30 or more [Yes] : Yes [Monthly or less (1 pt)] : Monthly or less (1 point) [1 or 2 (0 pts)] : 1 or 2 (0 points) [Never (0 pts)] : Never (0 points) [No] : In the past 12 months have you used drugs other than those required for medical reasons? No [Patient reported mammogram was normal] : Patient reported mammogram was normal [Patient declined colonoscopy] : Patient declined colonoscopy [No falls in past year] : Patient reported no falls in the past year [Fully functional (bathing, dressing, toileting, transferring, walking, feeding)] : Fully functional (bathing, dressing, toileting, transferring, walking, feeding) [Fully functional (using the telephone, shopping, preparing meals, housekeeping, doing laundry, using] : Fully functional and needs no help or supervision to perform IADLs (using the telephone, shopping, preparing meals, housekeeping, doing laundry, using transportation, managing medications and managing finances) [Smoke Detector] : smoke detector [Carbon Monoxide Detector] : carbon monoxide detector [Seat Belt] :  uses seat belt [Sunscreen] : uses sunscreen [With Patient/Caregiver] : With Patient/Caregiver [With Family] : lives with family [] :  [Audit-CScore] : 1 [de-identified] : walking regularly  [de-identified] : diet is good  [Change in mental status noted] : No change in mental status noted [Reports changes in hearing] : Reports no changes in hearing [Reports changes in vision] : Reports no changes in vision [MammogramDate] : 11/19 [AdvancecareDate] : 11/2020

## 2020-11-11 LAB
25(OH)D3 SERPL-MCNC: 67.5 NG/ML
ALBUMIN SERPL ELPH-MCNC: 4.5 G/DL
ALP BLD-CCNC: 74 U/L
ALT SERPL-CCNC: 15 U/L
ANION GAP SERPL CALC-SCNC: 12 MMOL/L
APPEARANCE: CLEAR
AST SERPL-CCNC: 13 U/L
BACTERIA: NEGATIVE
BASOPHILS # BLD AUTO: 0.04 K/UL
BASOPHILS NFR BLD AUTO: 0.5 %
BILIRUB SERPL-MCNC: 0.5 MG/DL
BILIRUBIN URINE: NEGATIVE
BLOOD URINE: NEGATIVE
BUN SERPL-MCNC: 15 MG/DL
CALCIUM SERPL-MCNC: 9.4 MG/DL
CHLORIDE SERPL-SCNC: 101 MMOL/L
CHOLEST SERPL-MCNC: 204 MG/DL
CO2 SERPL-SCNC: 27 MMOL/L
COLOR: NORMAL
CREAT SERPL-MCNC: 0.68 MG/DL
EOSINOPHIL # BLD AUTO: 0.12 K/UL
EOSINOPHIL NFR BLD AUTO: 1.6 %
ESTIMATED AVERAGE GLUCOSE: 128 MG/DL
FOLATE SERPL-MCNC: 15.6 NG/ML
GLUCOSE QUALITATIVE U: NEGATIVE
GLUCOSE SERPL-MCNC: 105 MG/DL
HBA1C MFR BLD HPLC: 6.1 %
HCT VFR BLD CALC: 42.3 %
HDLC SERPL-MCNC: 55 MG/DL
HGB BLD-MCNC: 13.7 G/DL
HYALINE CASTS: 1 /LPF
IMM GRANULOCYTES NFR BLD AUTO: 0.3 %
KETONES URINE: NEGATIVE
LDLC SERPL CALC-MCNC: 123 MG/DL
LEUKOCYTE ESTERASE URINE: NEGATIVE
LYMPHOCYTES # BLD AUTO: 2.55 K/UL
LYMPHOCYTES NFR BLD AUTO: 34.3 %
MAN DIFF?: NORMAL
MCHC RBC-ENTMCNC: 29.1 PG
MCHC RBC-ENTMCNC: 32.4 GM/DL
MCV RBC AUTO: 89.8 FL
MICROSCOPIC-UA: NORMAL
MONOCYTES # BLD AUTO: 0.56 K/UL
MONOCYTES NFR BLD AUTO: 7.5 %
NEUTROPHILS # BLD AUTO: 4.14 K/UL
NEUTROPHILS NFR BLD AUTO: 55.8 %
NITRITE URINE: NEGATIVE
NONHDLC SERPL-MCNC: 149 MG/DL
PH URINE: 6
PLATELET # BLD AUTO: 293 K/UL
POTASSIUM SERPL-SCNC: 3.9 MMOL/L
PROT SERPL-MCNC: 6.7 G/DL
PROTEIN URINE: NEGATIVE
RBC # BLD: 4.71 M/UL
RBC # FLD: 13.2 %
RED BLOOD CELLS URINE: 1 /HPF
SARS-COV-2 IGG SERPL IA-ACNC: <0.1 INDEX
SARS-COV-2 IGG SERPL QL IA: NEGATIVE
SODIUM SERPL-SCNC: 141 MMOL/L
SPECIFIC GRAVITY URINE: 1.01
SQUAMOUS EPITHELIAL CELLS: 2 /HPF
T4 FREE SERPL-MCNC: 1.4 NG/DL
TRIGL SERPL-MCNC: 132 MG/DL
TSH SERPL-ACNC: <0.01 UIU/ML
URATE SERPL-MCNC: 6 MG/DL
UROBILINOGEN URINE: NORMAL
VIT B12 SERPL-MCNC: 523 PG/ML
WBC # FLD AUTO: 7.43 K/UL
WHITE BLOOD CELLS URINE: 1 /HPF

## 2020-11-23 ENCOUNTER — OUTPATIENT (OUTPATIENT)
Dept: OUTPATIENT SERVICES | Facility: HOSPITAL | Age: 65
LOS: 1 days | End: 2020-11-23
Payer: MEDICARE

## 2020-11-23 ENCOUNTER — RESULT REVIEW (OUTPATIENT)
Age: 65
End: 2020-11-23

## 2020-11-23 ENCOUNTER — APPOINTMENT (OUTPATIENT)
Dept: RADIOLOGY | Facility: CLINIC | Age: 65
End: 2020-11-23
Payer: MEDICARE

## 2020-11-23 ENCOUNTER — APPOINTMENT (OUTPATIENT)
Dept: MAMMOGRAPHY | Facility: CLINIC | Age: 65
End: 2020-11-23
Payer: MEDICARE

## 2020-11-23 DIAGNOSIS — Z12.39 ENCOUNTER FOR OTHER SCREENING FOR MALIGNANT NEOPLASM OF BREAST: ICD-10-CM

## 2020-11-23 PROCEDURE — 77080 DXA BONE DENSITY AXIAL: CPT

## 2020-11-23 PROCEDURE — 77063 BREAST TOMOSYNTHESIS BI: CPT | Mod: 26

## 2020-11-23 PROCEDURE — 77063 BREAST TOMOSYNTHESIS BI: CPT

## 2020-11-23 PROCEDURE — 77080 DXA BONE DENSITY AXIAL: CPT | Mod: 26

## 2020-11-23 PROCEDURE — 77067 SCR MAMMO BI INCL CAD: CPT

## 2020-11-23 PROCEDURE — 77067 SCR MAMMO BI INCL CAD: CPT | Mod: 26

## 2020-11-24 ENCOUNTER — APPOINTMENT (OUTPATIENT)
Dept: CARDIOLOGY | Facility: CLINIC | Age: 65
End: 2020-11-24
Payer: MEDICARE

## 2020-11-24 VITALS
BODY MASS INDEX: 25.72 KG/M2 | SYSTOLIC BLOOD PRESSURE: 140 MMHG | WEIGHT: 131 LBS | HEIGHT: 60 IN | OXYGEN SATURATION: 100 % | DIASTOLIC BLOOD PRESSURE: 70 MMHG | HEART RATE: 97 BPM

## 2020-11-24 DIAGNOSIS — R06.00 DYSPNEA, UNSPECIFIED: ICD-10-CM

## 2020-11-24 DIAGNOSIS — R94.31 ABNORMAL ELECTROCARDIOGRAM [ECG] [EKG]: ICD-10-CM

## 2020-11-24 PROCEDURE — 99214 OFFICE O/P EST MOD 30 MIN: CPT

## 2020-11-24 NOTE — HISTORY OF PRESENT ILLNESS
[FreeTextEntry1] : Pt is a 64 y/o F current smoker who presents today for f/u.  She has PMH HTN, HLD, hyperthyroidism.  She mentions that she gets SOB with exertion which is new for her.  She denies CP, diaphoresis, palpitations, dizziness, syncope, LE edema, PND, orthopnea. \par Family hx: father DM/MI at age 50's, mother MI at age 54, brother PCI 30's, brother PCI 65, brother PCI 55\par \par TTE 08/2018 EF 60%, mild TR/PI\par \par PMH: smoker, HTN, HLD, preDM, hyperthyroidism\par Smoking status: 1 PPD\par Current exercise: none\par Daily water intake: 32 oz\par Daily caffeine intake: 2 cups coffee\par OTC medications: none\par Previous cardiac testing: none\par Previous hospitalizations: none\par 3 children  - no problems with pregnancies\par LMP at age 53

## 2020-11-24 NOTE — DISCUSSION/SUMMARY
[FreeTextEntry1] : Pt is a 64 y/o F current smoker who presents today for f/u.  She has PMH HTN, HLD, hyperthyroidism.  She mentions that she gets SOB with exertion which is new for her.  She denies CP, diaphoresis, palpitations, dizziness, syncope, LE edema, PND, orthopnea. \par Family hx: father DM/MI at age 50's, mother MI at age 54, brother PCI 30's, brother PCI 65, brother PCI 55\par TTE 08/2018 EF 60%, mild TR/PI\par repeat TTE\par Given her multiple risk factors including family hx, abnormal ECG and symptoms will CCTA to eval for CAD\par Advised pt to go to the nearest ED if symptoms persist or worsen\par HTN: c/w current meds.  Advised low salt diet\par HLD: start statin.  Advised lifestyle modifications.  Check labs in 3 mnths\par Discussed smoking cessation in great detail >3min\par The described plan was discussed with the pt.  All questions and concerns were addressed to the best of my knowledge.

## 2020-11-30 ENCOUNTER — NON-APPOINTMENT (OUTPATIENT)
Age: 65
End: 2020-11-30

## 2021-01-27 ENCOUNTER — APPOINTMENT (OUTPATIENT)
Dept: CARDIOLOGY | Facility: CLINIC | Age: 66
End: 2021-01-27

## 2021-06-03 ENCOUNTER — NON-APPOINTMENT (OUTPATIENT)
Age: 66
End: 2021-06-03

## 2021-06-03 VITALS — BODY MASS INDEX: 25.32 KG/M2 | WEIGHT: 129 LBS | HEIGHT: 60 IN

## 2021-06-03 NOTE — PLAN
[[place] [facility]] : referred to [place] [facility] [phone] [FreeTextEntry1] : - Low Dose CT chest for lung cancer screening.\par \par - Encouraged smoking cessation\par

## 2021-06-03 NOTE — HISTORY OF PRESENT ILLNESS
[TextBox_13] : Referred by Dr. Tiffanie Patton.\par \par Ms. EPPERSON is a 66 year old female with a history of HTN and emphysema.\par \par She was called to review eligibility for Low-Dose CT lung cancer screening.  Reviewed and confirmed that the patient meets screening eligibility criteria:\par \par 66 years old \par \par Smoking Status:  Current Smoker\par \par Number of pack(s) per day: 18\par Number of years smoked: 49\par Number of pack years smokin\par Recently cut down to 1/2 ppd\par \par Ms. EPPERSON denies any symptoms of lung cancer, including new cough, change in cough, hemoptysis, and unintentional weight loss.\par \par Ms. EPPERSON denies any personal history of lung cancer.  Admits to lung cancer in a first degree relative, her mother and brother.  Denies any history of occupational exposures.

## 2021-06-03 NOTE — DATA REVIEWED
[Lung Cancer Screening] : Patient underwent lung cancer screening [S] : S [3] : 3 [2] : 2 [TextBox_12] : 7/18 [TextBox_27] : 11/19 [TextBox_42] : 5/20

## 2021-06-07 ENCOUNTER — OUTPATIENT (OUTPATIENT)
Dept: OUTPATIENT SERVICES | Facility: HOSPITAL | Age: 66
LOS: 1 days | End: 2021-06-07
Payer: MEDICARE

## 2021-06-07 ENCOUNTER — APPOINTMENT (OUTPATIENT)
Dept: CT IMAGING | Facility: CLINIC | Age: 66
End: 2021-06-07
Payer: MEDICARE

## 2021-06-07 DIAGNOSIS — Z00.8 ENCOUNTER FOR OTHER GENERAL EXAMINATION: ICD-10-CM

## 2021-06-07 PROCEDURE — 71271 CT THORAX LUNG CANCER SCR C-: CPT

## 2021-06-07 PROCEDURE — 71271 CT THORAX LUNG CANCER SCR C-: CPT | Mod: 26

## 2021-07-12 ENCOUNTER — RX RENEWAL (OUTPATIENT)
Age: 66
End: 2021-07-12

## 2021-10-15 ENCOUNTER — RX RENEWAL (OUTPATIENT)
Age: 66
End: 2021-10-15

## 2021-12-08 ENCOUNTER — APPOINTMENT (OUTPATIENT)
Dept: FAMILY MEDICINE | Facility: CLINIC | Age: 66
End: 2021-12-08

## 2022-01-04 ENCOUNTER — TRANSCRIPTION ENCOUNTER (OUTPATIENT)
Age: 67
End: 2022-01-04

## 2022-01-18 ENCOUNTER — RX RENEWAL (OUTPATIENT)
Age: 67
End: 2022-01-18

## 2022-02-07 ENCOUNTER — TRANSCRIPTION ENCOUNTER (OUTPATIENT)
Age: 67
End: 2022-02-07

## 2022-02-09 ENCOUNTER — APPOINTMENT (OUTPATIENT)
Dept: FAMILY MEDICINE | Facility: CLINIC | Age: 67
End: 2022-02-09
Payer: MEDICARE

## 2022-02-09 VITALS
SYSTOLIC BLOOD PRESSURE: 170 MMHG | RESPIRATION RATE: 16 BRPM | DIASTOLIC BLOOD PRESSURE: 70 MMHG | BODY MASS INDEX: 24.74 KG/M2 | WEIGHT: 126 LBS | TEMPERATURE: 97.7 F | OXYGEN SATURATION: 97 % | HEIGHT: 60 IN | HEART RATE: 113 BPM

## 2022-02-09 VITALS — SYSTOLIC BLOOD PRESSURE: 130 MMHG | DIASTOLIC BLOOD PRESSURE: 60 MMHG | HEART RATE: 87 BPM

## 2022-02-09 DIAGNOSIS — Z13.820 ENCOUNTER FOR SCREENING FOR OSTEOPOROSIS: ICD-10-CM

## 2022-02-09 DIAGNOSIS — R19.5 OTHER FECAL ABNORMALITIES: ICD-10-CM

## 2022-02-09 LAB
NONINV COLON CA DNA+OCC BLD SCRN STL QL: NORMAL
NONINV COLON CA DNA+OCC BLD SCRN STL QL: NORMAL

## 2022-02-09 PROCEDURE — G0444 DEPRESSION SCREEN ANNUAL: CPT | Mod: 59

## 2022-02-09 PROCEDURE — 99214 OFFICE O/P EST MOD 30 MIN: CPT | Mod: 25

## 2022-02-09 RX ORDER — ATORVASTATIN CALCIUM 10 MG/1
10 TABLET, FILM COATED ORAL
Qty: 90 | Refills: 3 | Status: DISCONTINUED | COMMUNITY
Start: 2020-11-24 | End: 2022-02-09

## 2022-02-09 RX ORDER — ALENDRONATE SODIUM 70 MG/1
70 TABLET ORAL
Qty: 13 | Refills: 1 | Status: DISCONTINUED | COMMUNITY
Start: 2020-11-30 | End: 2022-02-09

## 2022-02-09 NOTE — PHYSICAL EXAM
[No Acute Distress] : no acute distress [Well Nourished] : well nourished [Well Developed] : well developed [Well-Appearing] : well-appearing [Normal Sclera/Conjunctiva] : normal sclera/conjunctiva [Normal Appearance] : was normal in appearance [Neck Supple] : was supple [No Respiratory Distress] : no respiratory distress  [Clear to Auscultation] : lungs were clear to auscultation bilaterally [Normal Rate] : normal rate  [Regular Rhythm] : with a regular rhythm [Normal S1, S2] : normal S1 and S2 [No Murmur] : no murmur heard [No Carotid Bruits] : no carotid bruits [No Edema] : there was no peripheral edema [Soft] : abdomen soft [Non Tender] : non-tender [Normal Bowel Sounds] : normal bowel sounds [Normal Affect] : the affect was normal [Alert and Oriented x3] : oriented to person, place, and time [Normal Mood] : the mood was normal

## 2022-02-09 NOTE — HEALTH RISK ASSESSMENT
[0] : 2) Feeling down, depressed, or hopeless: Not at all (0) [PHQ-2 Negative - No further assessment needed] : PHQ-2 Negative - No further assessment needed [[id number]] : 0

## 2022-02-09 NOTE — ASSESSMENT
[FreeTextEntry1] : HTN:\par BP improved upon recheck \par c/w Losartan- HCTZ \par \par Hyperlipidemia:\par stopped Atorvastatin \par check blood work \par c/w diet and exercise as tolerated\par \par Prediabetes:\par c/w diet and exercise as tolerated\par check blood work\par \par Hyperthyroidism/Multinodular goiter:\par check blood work\par f/u with endocrinology\par \par Osteoporosis:\par stopped Alendronate\par c/w Calcium and Vitamin D3 supplementation\par c/w weight bearing exercises \par fall precautions \par \par Cigarette Nicotine Dependence:\par has cut back on cigarettes\par patient aware of risks- looking to quit on her own\par up to date with CT lung cancer screening \par \par Health care maintenance:\par check blood work\par go for mammogram\par up to date with colonoscopy \par

## 2022-02-09 NOTE — HISTORY OF PRESENT ILLNESS
[FreeTextEntry1] : Follow up [de-identified] : \par 67 year old female presents for a follow up\par \par had been retired- felt restless and anxious at home\par started a job in retail\par keeping busy \par \par Has HTN- on Losartan- HCTZ 100- 25 mg daily\par BP currently is elevated- denies any headache, no chest pain\par \par Has Hyperlipidemia \par tried Atorvastatin- did not like the way she felt on the medication \par reports her diet has improved\par \par Has Hyperthyroidism- h/o goiter/thyroid nodules\par due to f/u with endocrinologist \par \par has Osteoporosis\par tried Alendronate- did not like how she felt on the medication\par stopped taking it\par does take Vitamin D3 5000 units daily and Calcium \par \par current smoker- down to 8 cigarettes a day\par up to date with CT lung cancer screening \par \par due for mammogram\par \par up to date with Colonoscopy- done in December 2020 \par \par declines Flu vaccine\par declines Pneumovax

## 2022-05-26 ENCOUNTER — APPOINTMENT (OUTPATIENT)
Dept: OPHTHALMOLOGY | Facility: CLINIC | Age: 67
End: 2022-05-26

## 2022-05-31 ENCOUNTER — NON-APPOINTMENT (OUTPATIENT)
Age: 67
End: 2022-05-31

## 2022-06-07 ENCOUNTER — NON-APPOINTMENT (OUTPATIENT)
Age: 67
End: 2022-06-07

## 2022-06-07 ENCOUNTER — APPOINTMENT (OUTPATIENT)
Dept: OPHTHALMOLOGY | Facility: CLINIC | Age: 67
End: 2022-06-07
Payer: MEDICARE

## 2022-06-07 PROCEDURE — 92012 INTRM OPH EXAM EST PATIENT: CPT

## 2022-06-16 ENCOUNTER — NON-APPOINTMENT (OUTPATIENT)
Age: 67
End: 2022-06-16

## 2022-06-17 ENCOUNTER — TRANSCRIPTION ENCOUNTER (OUTPATIENT)
Age: 67
End: 2022-06-17

## 2022-06-21 ENCOUNTER — APPOINTMENT (OUTPATIENT)
Dept: OPHTHALMOLOGY | Facility: CLINIC | Age: 67
End: 2022-06-21
Payer: MEDICARE

## 2022-06-21 ENCOUNTER — NON-APPOINTMENT (OUTPATIENT)
Age: 67
End: 2022-06-21

## 2022-06-21 PROCEDURE — 92020 GONIOSCOPY: CPT

## 2022-06-21 PROCEDURE — 92014 COMPRE OPH EXAM EST PT 1/>: CPT

## 2022-06-22 ENCOUNTER — NON-APPOINTMENT (OUTPATIENT)
Age: 67
End: 2022-06-22

## 2022-06-23 ENCOUNTER — NON-APPOINTMENT (OUTPATIENT)
Age: 67
End: 2022-06-23

## 2022-06-23 VITALS — BODY MASS INDEX: 24.54 KG/M2 | HEIGHT: 60 IN | WEIGHT: 125 LBS

## 2022-06-23 NOTE — HISTORY OF PRESENT ILLNESS
[_____ pack-years] : [unfilled] pack-years [TextBox_13] : Referred by Dr. Tiffanie Patton.\par \par Ms. EPPERSON is a 67 year old female with a history of HTN and emphysema.\par \par She was called to review eligibility for Low-Dose CT lung cancer screening.  Reviewed and confirmed that the patient meets screening eligibility criteria:\par \par 67 years old \par \par Smoking Status: Current Smoker\par \par Number of pack(s) per day: 18 cigarettes\par Number of years smoked: 50\par Number of pack years smokin\par \par Ms. EPPERSON denies any symptoms of lung cancer, including new cough, change in cough, hemoptysis, and unintentional weight loss.\par \par Ms. EPPERSON denies any personal history of lung cancer.  Admits to lung cancer in a first degree relative, her mother and brother.  Denies any history of occupational exposures. [TextBox_6] : 9/10 [TextBox_8] : 50

## 2022-06-24 ENCOUNTER — NON-APPOINTMENT (OUTPATIENT)
Age: 67
End: 2022-06-24

## 2022-06-28 ENCOUNTER — OUTPATIENT (OUTPATIENT)
Dept: OUTPATIENT SERVICES | Facility: HOSPITAL | Age: 67
LOS: 1 days | End: 2022-06-28
Payer: MEDICARE

## 2022-06-28 ENCOUNTER — RESULT REVIEW (OUTPATIENT)
Age: 67
End: 2022-06-28

## 2022-06-28 ENCOUNTER — APPOINTMENT (OUTPATIENT)
Dept: CT IMAGING | Facility: CLINIC | Age: 67
End: 2022-06-28

## 2022-06-28 ENCOUNTER — APPOINTMENT (OUTPATIENT)
Dept: MAMMOGRAPHY | Facility: CLINIC | Age: 67
End: 2022-06-28

## 2022-06-28 DIAGNOSIS — F17.210 NICOTINE DEPENDENCE, CIGARETTES, UNCOMPLICATED: ICD-10-CM

## 2022-06-28 DIAGNOSIS — R91.8 OTHER NONSPECIFIC ABNORMAL FINDING OF LUNG FIELD: ICD-10-CM

## 2022-06-28 DIAGNOSIS — Z12.39 ENCOUNTER FOR OTHER SCREENING FOR MALIGNANT NEOPLASM OF BREAST: ICD-10-CM

## 2022-06-28 PROCEDURE — 77067 SCR MAMMO BI INCL CAD: CPT | Mod: 26

## 2022-06-28 PROCEDURE — 77063 BREAST TOMOSYNTHESIS BI: CPT

## 2022-06-28 PROCEDURE — 71271 CT THORAX LUNG CANCER SCR C-: CPT

## 2022-06-28 PROCEDURE — 77063 BREAST TOMOSYNTHESIS BI: CPT | Mod: 26

## 2022-06-28 PROCEDURE — 71271 CT THORAX LUNG CANCER SCR C-: CPT | Mod: 26

## 2022-06-28 PROCEDURE — 77067 SCR MAMMO BI INCL CAD: CPT

## 2022-07-15 ENCOUNTER — TRANSCRIPTION ENCOUNTER (OUTPATIENT)
Age: 67
End: 2022-07-15

## 2022-08-03 ENCOUNTER — TRANSCRIPTION ENCOUNTER (OUTPATIENT)
Age: 67
End: 2022-08-03

## 2022-10-17 ENCOUNTER — APPOINTMENT (OUTPATIENT)
Dept: FAMILY MEDICINE | Facility: CLINIC | Age: 67
End: 2022-10-17

## 2022-10-17 VITALS
HEIGHT: 60 IN | HEART RATE: 72 BPM | TEMPERATURE: 97.3 F | WEIGHT: 132 LBS | DIASTOLIC BLOOD PRESSURE: 82 MMHG | SYSTOLIC BLOOD PRESSURE: 134 MMHG | BODY MASS INDEX: 25.91 KG/M2 | OXYGEN SATURATION: 96 %

## 2022-10-17 VITALS — SYSTOLIC BLOOD PRESSURE: 130 MMHG | DIASTOLIC BLOOD PRESSURE: 60 MMHG

## 2022-10-17 DIAGNOSIS — M81.0 AGE-RELATED OSTEOPOROSIS W/OUT CURRENT PATHOLOGICAL FRACTURE: ICD-10-CM

## 2022-10-17 DIAGNOSIS — Z00.00 ENCOUNTER FOR GENERAL ADULT MEDICAL EXAMINATION W/OUT ABNORMAL FINDINGS: ICD-10-CM

## 2022-10-17 PROCEDURE — 99214 OFFICE O/P EST MOD 30 MIN: CPT | Mod: 25

## 2022-10-17 PROCEDURE — G0439: CPT

## 2022-10-17 NOTE — PHYSICAL EXAM
[No Acute Distress] : no acute distress [Well Nourished] : well nourished [Well Developed] : well developed [Well-Appearing] : well-appearing [Normal Sclera/Conjunctiva] : normal sclera/conjunctiva [PERRL] : pupils equal round and reactive to light [Normal Oropharynx] : the oropharynx was normal [Normal TMs] : both tympanic membranes were normal [Normal Appearance] : was normal in appearance [Neck Supple] : was supple [No Respiratory Distress] : no respiratory distress  [Clear to Auscultation] : lungs were clear to auscultation bilaterally [Normal Rate] : normal rate  [Regular Rhythm] : with a regular rhythm [Normal S1, S2] : normal S1 and S2 [No Murmur] : no murmur heard [No Carotid Bruits] : no carotid bruits [No Edema] : there was no peripheral edema [Soft] : abdomen soft [Non Tender] : non-tender [Normal Bowel Sounds] : normal bowel sounds [Normal Posterior Cervical Nodes] : no posterior cervical lymphadenopathy [Normal Anterior Cervical Nodes] : no anterior cervical lymphadenopathy [No Rash] : no rash [No Focal Deficits] : no focal deficits [Normal Affect] : the affect was normal [Alert and Oriented x3] : oriented to person, place, and time [Normal Mood] : the mood was normal

## 2022-10-27 NOTE — ASSESSMENT
[FreeTextEntry1] : Health care maintenance:\par check blood work\par follow up with optometry/ophthalmology and dentist for routine exams when due \par follow up with GYN\par up to date with mammogram\par up to date with colonoscopy \par c/w diet and exercise as tolerated\par declines Flu vaccine\par declines Pneumovax\par \par HTN:\par stable\par c/w Losartan- HCTZ \par follow up with cardiology \par \par Hyperlipidemia:\par c/w diet and exercise as tolerated\par check blood work\par follow up with cardiology \par \par Prediabetes:\par c/w diet and exercise as tolerated\par check blood work\par \par Osteoporosis:\par referred for bone density\par \par Cigarette Nicotine Dependence:\par trial of Varenicline, risks and benefits discussed \par up to date with CT lung cancer screening \par \par \par \par

## 2022-10-27 NOTE — HISTORY OF PRESENT ILLNESS
[FreeTextEntry1] : Annual physical  [de-identified] : \par 67 year old female presents for an annual physical \par \par Has HTN- on Losartan- HCTZ 100- 25 mg daily\par \par Has Hyperlipidemia \par \par Has Hyperthyroidism- h/o goiter/thyroid nodules\par scheduled to see endocrinologist next month \par \par scheduled to see cardiologist in December 2022\par \par has Osteoporosis\par tried Alendronate- did not like how she felt on the medication, off of medication\par does take Vitamin D3 and Calcium daily\par \par current smoker- fluctuates between 10-12 cigarettes a day\par wants to quit smoking\par has gone to a smoking cessation class previously\par was tried on Wellbutrin previously but she developed hives after taking the medication\par willing to try alternative medicaiton\par up to date with CT lung cancer screening (June 2022)\par \par up to date with Colonoscopy- done in December 2020 \par \par declines Flu vaccine\par declines Pneumovax

## 2022-11-22 ENCOUNTER — APPOINTMENT (OUTPATIENT)
Dept: DERMATOLOGY | Facility: CLINIC | Age: 67
End: 2022-11-22

## 2022-11-22 PROCEDURE — 99203 OFFICE O/P NEW LOW 30 MIN: CPT

## 2022-11-22 RX ORDER — TRETINOIN 0.5 MG/G
0.05 CREAM TOPICAL
Qty: 1 | Refills: 2 | Status: ACTIVE | COMMUNITY
Start: 2022-11-22 | End: 1900-01-01

## 2022-11-22 NOTE — CONSULT LETTER
[Dear  ___] : Dear  [unfilled], [Consult Letter:] : I had the pleasure of evaluating your patient, [unfilled]. [Consult Closing:] : Thank you very much for allowing me to participate in the care of this patient.  If you have any questions, please do not hesitate to contact me. [Sincerely,] : Sincerely, [FreeTextEntry2] : Tiffanie Patton [FreeTextEntry1] : She has a long history of acne with a persistent comedonal component and marked scarring.\par \par Please see attached chart note for further details and treatment plan. [FreeTextEntry3] : Dominick Aburto MD\par [address], [address]\[place] [address]\par Tel ([phone])\par Fax ([phone])\par Private line ([phone])

## 2022-11-22 NOTE — PHYSICAL EXAM
done [Alert] : alert [Oriented x 3] : ~L oriented x 3 [Well Nourished] : well nourished [FreeTextEntry3] : 2 skin\par \par Face: Rare acne cyst presents\par Mild to moderate large to close comedones present–especially lower face\par Marked diffuse scarring present

## 2022-11-22 NOTE — HISTORY OF PRESENT ILLNESS
[FreeTextEntry1] : Acne [de-identified] : First visit for 67-year-old white female referred by Tiffanie Patton DO, with a long history of "cystic acne".  Treated with minocycline in the distant past.\par Note: Patient has history of allergy to tetracycline but was able to take minocycline.\par \par On no current treatment.

## 2022-12-06 ENCOUNTER — APPOINTMENT (OUTPATIENT)
Dept: CARDIOLOGY | Facility: CLINIC | Age: 67
End: 2022-12-06
Payer: MEDICARE

## 2022-12-06 ENCOUNTER — NON-APPOINTMENT (OUTPATIENT)
Age: 67
End: 2022-12-06

## 2022-12-06 VITALS
WEIGHT: 132 LBS | DIASTOLIC BLOOD PRESSURE: 91 MMHG | HEART RATE: 96 BPM | SYSTOLIC BLOOD PRESSURE: 165 MMHG | BODY MASS INDEX: 25.91 KG/M2 | OXYGEN SATURATION: 97 % | HEIGHT: 60 IN

## 2022-12-06 DIAGNOSIS — F17.210 NICOTINE DEPENDENCE, CIGARETTES, UNCOMPLICATED: ICD-10-CM

## 2022-12-06 DIAGNOSIS — I25.84 ATHEROSCLEROTIC HEART DISEASE OF NATIVE CORONARY ARTERY W/OUT ANGINA PECTORIS: ICD-10-CM

## 2022-12-06 DIAGNOSIS — I25.10 ATHEROSCLEROTIC HEART DISEASE OF NATIVE CORONARY ARTERY W/OUT ANGINA PECTORIS: ICD-10-CM

## 2022-12-06 DIAGNOSIS — E78.5 HYPERLIPIDEMIA, UNSPECIFIED: ICD-10-CM

## 2022-12-06 PROCEDURE — 93000 ELECTROCARDIOGRAM COMPLETE: CPT

## 2022-12-06 PROCEDURE — 99406 BEHAV CHNG SMOKING 3-10 MIN: CPT

## 2022-12-06 PROCEDURE — 99214 OFFICE O/P EST MOD 30 MIN: CPT | Mod: 25

## 2022-12-06 NOTE — HISTORY OF PRESENT ILLNESS
[FreeTextEntry1] : Pt is a 68 y/o F current smoker who presents today for f/u.  She has PMH HTN, HLD, hyperthyroidism. \par Pt had lung CT which showed coronary artery and aorta calcifications.  Pt reports feeling well and has no active cardiac complaints - denies CP, SOB, palpitations, dizziness, syncope, edema, orthopnea, PND, orthopnea.  No exertional symptoms. \par Family hx: father DM/MI at age 50's, mother MI at age 54, brother PCI 30's, brother PCI 65, brother PCI 55\par \par TTE 08/2018 EF 60%, mild TR/PI\par \par PMH: smoker, HTN, HLD, preDM, hyperthyroidism\par Smoking status: <1/2 PPD\par Current exercise: none\par Daily water intake: 32 oz\par Daily caffeine intake: 2 cups coffee\par OTC medications: none\par Previous hospitalizations: none\par 3 children  - no problems with pregnancies\par LMP at age 53

## 2022-12-06 NOTE — REASON FOR VISIT
[Follow-Up - Clinic] : a clinic follow-up of [Abnormal ECG] : an abnormal ECG [Hyperlipidemia] : hyperlipidemia [Hypertension] : hypertension [CV Risk Factors and Non-Cardiac Disease] : CV risk factors and non-cardiac disease

## 2022-12-06 NOTE — DISCUSSION/SUMMARY
[FreeTextEntry1] : Pt is a 66 y/o F current smoker who presents today for f/u.  She has PMH HTN, HLD, hyperthyroidism. \par Pt had lung CT which showed coronary artery and aorta calcifications. \par Family hx: father DM/MI at age 50's, mother MI at age 54, brother PCI 30's, brother PCI 65, brother PCI 55\par TTE 08/2018 EF 60%, mild TR/PI\par repeat TTE\par Given her multiple risk factors including family hx, abnormal ECG and recent CT will CCTA to eval for CAD\par \par HTN:\par well controlled\par c/w current meds\par Advised low salt diet, regular exercise, weight loss\par Encouraged pt to check BP at home and keep journal \par \par HLD:\par Advised lifestyle modifications \par labs ordered by PCP\par \par Discussed smoking cessation in great detail >3min\par The described plan was discussed with the pt.  All questions and concerns were addressed to the best of my knowledge.

## 2022-12-06 NOTE — PHYSICAL EXAM
[General Appearance - Well Developed] : well developed [Normal Appearance] : normal appearance [Well Groomed] : well groomed [General Appearance - Well Nourished] : well nourished [No Deformities] : no deformities [General Appearance - In No Acute Distress] : no acute distress [Eyelids - No Xanthelasma] : the eyelids demonstrated no xanthelasmas [Normal Oral Mucosa] : normal oral mucosa [No Oral Pallor] : no oral pallor [No Oral Cyanosis] : no oral cyanosis [Respiration, Rhythm And Depth] : normal respiratory rhythm and effort [Exaggerated Use Of Accessory Muscles For Inspiration] : no accessory muscle use [Auscultation Breath Sounds / Voice Sounds] : lungs were clear to auscultation bilaterally [Heart Rate And Rhythm] : heart rate and rhythm were normal [Heart Sounds] : normal S1 and S2 [Murmurs] : no murmurs present [Arterial Pulses Normal] : the arterial pulses were normal [Edema] : no peripheral edema present [Abdomen Soft] : soft [Abdomen Tenderness] : non-tender [Abdomen Mass (___ Cm)] : no abdominal mass palpated [Abnormal Walk] : normal gait [Gait - Sufficient For Exercise Testing] : the gait was sufficient for exercise testing [Nail Clubbing] : no clubbing of the fingernails [Cyanosis, Localized] : no localized cyanosis [Petechial Hemorrhages (___cm)] : no petechial hemorrhages [] : no ischemic changes [Oriented To Time, Place, And Person] : oriented to person, place, and time [Impaired Insight] : insight and judgment were intact [Affect] : the affect was normal [Mood] : the mood was normal [No Anxiety] : not feeling anxious [Well Developed] : well developed [Well Nourished] : well nourished [No Acute Distress] : no acute distress [Normal Conjunctiva] : normal conjunctiva [Normal Venous Pressure] : normal venous pressure [No Carotid Bruit] : no carotid bruit [Normal S1, S2] : normal S1, S2 [No Murmur] : no murmur [No Rub] : no rub [No Gallop] : no gallop [Clear Lung Fields] : clear lung fields [Good Air Entry] : good air entry [No Respiratory Distress] : no respiratory distress  [Soft] : abdomen soft [Non Tender] : non-tender [No Masses/organomegaly] : no masses/organomegaly [Normal Bowel Sounds] : normal bowel sounds [Normal Gait] : normal gait [No Edema] : no edema [No Cyanosis] : no cyanosis [No Clubbing] : no clubbing [No Varicosities] : no varicosities [No Rash] : no rash [No Skin Lesions] : no skin lesions [Moves all extremities] : moves all extremities [No Focal Deficits] : no focal deficits [Normal Speech] : normal speech [Alert and Oriented] : alert and oriented [Normal memory] : normal memory [FreeTextEntry1] : no JVD or bruits

## 2022-12-15 ENCOUNTER — NON-APPOINTMENT (OUTPATIENT)
Age: 67
End: 2022-12-15

## 2022-12-27 ENCOUNTER — APPOINTMENT (OUTPATIENT)
Dept: CARDIOLOGY | Facility: CLINIC | Age: 67
End: 2022-12-27

## 2023-01-10 ENCOUNTER — APPOINTMENT (OUTPATIENT)
Dept: DERMATOLOGY | Facility: CLINIC | Age: 68
End: 2023-01-10
Payer: MEDICARE

## 2023-01-10 PROCEDURE — 99213 OFFICE O/P EST LOW 20 MIN: CPT

## 2023-01-10 NOTE — HISTORY OF PRESENT ILLNESS
[FreeTextEntry1] : Acne [de-identified] : Follow-up visit for 67-year-old white female first seen by me on November 22, 2022, with a long history of "cystic acne".\par Treated with minocycline in the distant past.\par Note: Patient has history of allergy to tetracycline but was able to tolerate minocycline.\par \par Diagnosed by me as having residual acne vulgaris–primarily comedonal with marked scarring.  Treated with:\par Start 10% benzoyl peroxide wash in shower\par Start tretinoin cream 0.05% apply sparingly to face at night\par Acne surgery done

## 2023-01-10 NOTE — PHYSICAL EXAM
[Alert] : alert [Oriented x 3] : ~L oriented x 3 [Well Nourished] : well nourished [FreeTextEntry3] : Face: Moderate closed comedones\par March small depressed scars present on cheeks

## 2023-01-16 ENCOUNTER — APPOINTMENT (OUTPATIENT)
Dept: CARDIOLOGY | Facility: CLINIC | Age: 68
End: 2023-01-16
Payer: MEDICARE

## 2023-01-16 PROCEDURE — 93306 TTE W/DOPPLER COMPLETE: CPT

## 2023-02-21 ENCOUNTER — APPOINTMENT (OUTPATIENT)
Dept: DERMATOLOGY | Facility: CLINIC | Age: 68
End: 2023-02-21
Payer: MEDICARE

## 2023-02-21 DIAGNOSIS — L73.0 ACNE KELOID: ICD-10-CM

## 2023-02-21 DIAGNOSIS — L70.0 ACNE VULGARIS: ICD-10-CM

## 2023-02-21 DIAGNOSIS — L72.0 EPIDERMAL CYST: ICD-10-CM

## 2023-02-21 PROCEDURE — 99213 OFFICE O/P EST LOW 20 MIN: CPT

## 2023-02-21 NOTE — HISTORY OF PRESENT ILLNESS
[FreeTextEntry1] : Acne [de-identified] : Follow-up visit for 68-year-old white female last seen by me on January 10, 2023, with a long history of "cystic acne".\par Acne which principally comedonal with scarring.  Treated with:\par \par Start 10% benzoyl peroxide wash–patient uses this twice a day\par Continue tretinoin cream 0.05% applied sparingly at night\par Acne surgery done at the last visit\par \par Note: Patient has history of allergy to tetracycline but is able to tolerate minocycline\par \par Complains of new lesions on the forehead

## 2023-02-21 NOTE — PHYSICAL EXAM
[Alert] : alert [Oriented x 3] : ~L oriented x 3 [Well Nourished] : well nourished [FreeTextEntry3] : Face: Mild 1 mm white papules present on forehead and along lower lip margin\par Mild closed comedones present on cheeks with extensive pitted scars

## 2023-02-28 ENCOUNTER — APPOINTMENT (OUTPATIENT)
Dept: OPHTHALMOLOGY | Facility: CLINIC | Age: 68
End: 2023-02-28
Payer: MEDICARE

## 2023-02-28 ENCOUNTER — NON-APPOINTMENT (OUTPATIENT)
Age: 68
End: 2023-02-28

## 2023-02-28 PROCEDURE — 92012 INTRM OPH EXAM EST PATIENT: CPT

## 2023-02-28 PROCEDURE — 92020 GONIOSCOPY: CPT

## 2023-04-17 ENCOUNTER — APPOINTMENT (OUTPATIENT)
Dept: DERMATOLOGY | Facility: CLINIC | Age: 68
End: 2023-04-17

## 2023-04-17 ENCOUNTER — NON-APPOINTMENT (OUTPATIENT)
Age: 68
End: 2023-04-17

## 2023-04-27 ENCOUNTER — RX RENEWAL (OUTPATIENT)
Age: 68
End: 2023-04-27

## 2023-05-01 ENCOUNTER — APPOINTMENT (OUTPATIENT)
Dept: FAMILY MEDICINE | Facility: CLINIC | Age: 68
End: 2023-05-01
Payer: MEDICARE

## 2023-05-01 VITALS
DIASTOLIC BLOOD PRESSURE: 78 MMHG | WEIGHT: 127 LBS | SYSTOLIC BLOOD PRESSURE: 122 MMHG | HEART RATE: 118 BPM | OXYGEN SATURATION: 98 % | TEMPERATURE: 97.4 F | BODY MASS INDEX: 24.94 KG/M2 | HEIGHT: 60 IN

## 2023-05-01 DIAGNOSIS — R10.9 UNSPECIFIED ABDOMINAL PAIN: ICD-10-CM

## 2023-05-01 DIAGNOSIS — Z13.31 ENCOUNTER FOR SCREENING FOR DEPRESSION: ICD-10-CM

## 2023-05-01 DIAGNOSIS — R14.0 ABDOMINAL DISTENSION (GASEOUS): ICD-10-CM

## 2023-05-01 PROCEDURE — 99214 OFFICE O/P EST MOD 30 MIN: CPT | Mod: 25

## 2023-05-01 PROCEDURE — G0444 DEPRESSION SCREEN ANNUAL: CPT | Mod: 59

## 2023-05-02 ENCOUNTER — APPOINTMENT (OUTPATIENT)
Dept: CT IMAGING | Facility: CLINIC | Age: 68
End: 2023-05-02

## 2023-05-02 ENCOUNTER — INPATIENT (INPATIENT)
Facility: HOSPITAL | Age: 68
LOS: 6 days | Discharge: HOME | DRG: 444 | End: 2023-05-09
Attending: SURGERY | Admitting: INTERNAL MEDICINE
Payer: MEDICARE

## 2023-05-02 VITALS
TEMPERATURE: 98 F | HEART RATE: 90 BPM | DIASTOLIC BLOOD PRESSURE: 75 MMHG | OXYGEN SATURATION: 97 % | RESPIRATION RATE: 20 BRPM | SYSTOLIC BLOOD PRESSURE: 156 MMHG

## 2023-05-02 DIAGNOSIS — R17 UNSPECIFIED JAUNDICE: ICD-10-CM

## 2023-05-02 PROBLEM — Z13.31 SCREENING FOR DEPRESSION: Status: ACTIVE | Noted: 2020-11-09

## 2023-05-02 LAB
ALBUMIN SERPL ELPH-MCNC: 3.4 G/DL — SIGNIFICANT CHANGE UP (ref 3.3–5.2)
ALBUMIN SERPL ELPH-MCNC: 4.1 G/DL
ALP BLD-CCNC: 437 U/L
ALP SERPL-CCNC: 417 U/L — HIGH (ref 40–120)
ALT FLD-CCNC: 261 U/L — HIGH
ALT SERPL-CCNC: 288 U/L
AMYLASE/CREAT SERPL: 785 U/L
ANION GAP SERPL CALC-SCNC: 15 MMOL/L
ANION GAP SERPL CALC-SCNC: 19 MMOL/L — HIGH (ref 5–17)
APPEARANCE: ABNORMAL
APTT BLD: 41.1 SEC — HIGH (ref 27.5–35.5)
AST SERPL-CCNC: 114 U/L — HIGH
AST SERPL-CCNC: 132 U/L
BACTERIA: NEGATIVE /HPF
BASOPHILS # BLD AUTO: 0.05 K/UL — SIGNIFICANT CHANGE UP (ref 0–0.2)
BASOPHILS # BLD AUTO: 0.06 K/UL
BASOPHILS NFR BLD AUTO: 0.5 % — SIGNIFICANT CHANGE UP (ref 0–2)
BASOPHILS NFR BLD AUTO: 0.6 %
BILIRUB DIRECT SERPL-MCNC: 8.8 MG/DL
BILIRUB INDIRECT SERPL-MCNC: 2 MG/DL
BILIRUB SERPL-MCNC: 10 MG/DL — HIGH (ref 0.4–2)
BILIRUB SERPL-MCNC: 10.8 MG/DL
BILIRUBIN URINE: ABNORMAL
BLOOD URINE: NEGATIVE
BUN SERPL-MCNC: 24.9 MG/DL — HIGH (ref 8–20)
BUN SERPL-MCNC: 32 MG/DL
CALCIUM SERPL-MCNC: 10.4 MG/DL
CALCIUM SERPL-MCNC: 9.5 MG/DL — SIGNIFICANT CHANGE UP (ref 8.4–10.5)
CAST: 1 /LPF
CHLORIDE SERPL-SCNC: 101 MMOL/L — SIGNIFICANT CHANGE UP (ref 96–108)
CHLORIDE SERPL-SCNC: 97 MMOL/L
CO2 SERPL-SCNC: 19 MMOL/L — LOW (ref 22–29)
CO2 SERPL-SCNC: 27 MMOL/L
COLOR: NORMAL
CREAT SERPL-MCNC: 0.7 MG/DL — SIGNIFICANT CHANGE UP (ref 0.5–1.3)
CREAT SERPL-MCNC: 1.04 MG/DL
EGFR: 58 ML/MIN/1.73M2
EGFR: 94 ML/MIN/1.73M2 — SIGNIFICANT CHANGE UP
EOSINOPHIL # BLD AUTO: 0.07 K/UL — SIGNIFICANT CHANGE UP (ref 0–0.5)
EOSINOPHIL # BLD AUTO: 0.12 K/UL
EOSINOPHIL NFR BLD AUTO: 0.7 % — SIGNIFICANT CHANGE UP (ref 0–6)
EOSINOPHIL NFR BLD AUTO: 1.2 %
EPITHELIAL CELLS: 2 /HPF
GLUCOSE QUALITATIVE U: NEGATIVE MG/DL
GLUCOSE SERPL-MCNC: 131 MG/DL — HIGH (ref 70–99)
GLUCOSE SERPL-MCNC: 148 MG/DL
HAV IGM SER QL: NONREACTIVE
HBV CORE IGM SER QL: NONREACTIVE
HBV SURFACE AG SER QL: NONREACTIVE
HCT VFR BLD CALC: 34.1 % — LOW (ref 34.5–45)
HCT VFR BLD CALC: 35.9 %
HCV AB SER QL: NONREACTIVE
HCV S/CO RATIO: 0.09 S/CO
HGB BLD-MCNC: 11.5 G/DL — SIGNIFICANT CHANGE UP (ref 11.5–15.5)
HGB BLD-MCNC: 11.7 G/DL
IMM GRANULOCYTES NFR BLD AUTO: 0.3 % — SIGNIFICANT CHANGE UP (ref 0–0.9)
IMM GRANULOCYTES NFR BLD AUTO: 0.4 %
INR BLD: 1.14 RATIO — SIGNIFICANT CHANGE UP (ref 0.88–1.16)
KETONES URINE: NEGATIVE MG/DL
LEUKOCYTE ESTERASE URINE: ABNORMAL
LPL SERPL-CCNC: 1362 U/L
LYMPHOCYTES # BLD AUTO: 1.41 K/UL — SIGNIFICANT CHANGE UP (ref 1–3.3)
LYMPHOCYTES # BLD AUTO: 1.51 K/UL
LYMPHOCYTES # BLD AUTO: 14 % — SIGNIFICANT CHANGE UP (ref 13–44)
LYMPHOCYTES NFR BLD AUTO: 15.5 %
MAN DIFF?: NORMAL
MCHC RBC-ENTMCNC: 27.7 PG
MCHC RBC-ENTMCNC: 28 PG — SIGNIFICANT CHANGE UP (ref 27–34)
MCHC RBC-ENTMCNC: 32.6 GM/DL
MCHC RBC-ENTMCNC: 33.7 GM/DL — SIGNIFICANT CHANGE UP (ref 32–36)
MCV RBC AUTO: 83.2 FL — SIGNIFICANT CHANGE UP (ref 80–100)
MCV RBC AUTO: 84.9 FL
MICROSCOPIC-UA: NORMAL
MONOCYTES # BLD AUTO: 0.63 K/UL — SIGNIFICANT CHANGE UP (ref 0–0.9)
MONOCYTES # BLD AUTO: 0.75 K/UL
MONOCYTES NFR BLD AUTO: 6.3 % — SIGNIFICANT CHANGE UP (ref 2–14)
MONOCYTES NFR BLD AUTO: 7.7 %
NEUTROPHILS # BLD AUTO: 7.29 K/UL
NEUTROPHILS # BLD AUTO: 7.87 K/UL — HIGH (ref 1.8–7.4)
NEUTROPHILS NFR BLD AUTO: 74.6 %
NEUTROPHILS NFR BLD AUTO: 78.2 % — HIGH (ref 43–77)
NITRITE URINE: NEGATIVE
PH URINE: 6
PLATELET # BLD AUTO: 415 K/UL — HIGH (ref 150–400)
PLATELET # BLD AUTO: 440 K/UL
POTASSIUM SERPL-MCNC: 3.5 MMOL/L — SIGNIFICANT CHANGE UP (ref 3.5–5.3)
POTASSIUM SERPL-SCNC: 3.5 MMOL/L
POTASSIUM SERPL-SCNC: 3.5 MMOL/L — SIGNIFICANT CHANGE UP (ref 3.5–5.3)
PROT SERPL-MCNC: 6.9 G/DL
PROT SERPL-MCNC: 6.9 G/DL — SIGNIFICANT CHANGE UP (ref 6.6–8.7)
PROTEIN URINE: NORMAL MG/DL
PROTHROM AB SERPL-ACNC: 13.3 SEC — SIGNIFICANT CHANGE UP (ref 10.5–13.4)
RBC # BLD: 4.1 M/UL — SIGNIFICANT CHANGE UP (ref 3.8–5.2)
RBC # BLD: 4.23 M/UL
RBC # FLD: 14.1 % — SIGNIFICANT CHANGE UP (ref 10.3–14.5)
RBC # FLD: 14.3 %
RED BLOOD CELLS URINE: 7 /HPF
REVIEW: NORMAL
SODIUM SERPL-SCNC: 139 MMOL/L
SODIUM SERPL-SCNC: 139 MMOL/L — SIGNIFICANT CHANGE UP (ref 135–145)
SPECIFIC GRAVITY URINE: 1.02
UROBILINOGEN URINE: 0.2 MG/DL
WBC # BLD: 10.06 K/UL — SIGNIFICANT CHANGE UP (ref 3.8–10.5)
WBC # FLD AUTO: 10.06 K/UL — SIGNIFICANT CHANGE UP (ref 3.8–10.5)
WBC # FLD AUTO: 9.77 K/UL
WHITE BLOOD CELLS URINE: 0 /HPF

## 2023-05-02 PROCEDURE — 99285 EMERGENCY DEPT VISIT HI MDM: CPT

## 2023-05-02 RX ORDER — ONDANSETRON 8 MG/1
4 TABLET, FILM COATED ORAL EVERY 8 HOURS
Refills: 0 | Status: DISCONTINUED | OUTPATIENT
Start: 2023-05-02 | End: 2023-05-09

## 2023-05-02 RX ORDER — ACETAMINOPHEN 500 MG
650 TABLET ORAL EVERY 6 HOURS
Refills: 0 | Status: DISCONTINUED | OUTPATIENT
Start: 2023-05-02 | End: 2023-05-09

## 2023-05-02 RX ORDER — SODIUM CHLORIDE 9 MG/ML
1000 INJECTION, SOLUTION INTRAVENOUS
Refills: 0 | Status: DISCONTINUED | OUTPATIENT
Start: 2023-05-02 | End: 2023-05-05

## 2023-05-02 RX ORDER — LANOLIN ALCOHOL/MO/W.PET/CERES
3 CREAM (GRAM) TOPICAL AT BEDTIME
Refills: 0 | Status: DISCONTINUED | OUTPATIENT
Start: 2023-05-02 | End: 2023-05-09

## 2023-05-02 RX ADMIN — SODIUM CHLORIDE 75 MILLILITER(S): 9 INJECTION, SOLUTION INTRAVENOUS at 23:44

## 2023-05-02 NOTE — ED ADULT NURSE REASSESSMENT NOTE - NS ED NURSE REASSESS COMMENT FT1
Assumed care of patient at 19:15 in CC area of ED as stated in report from NAINA Corbett. Charting as noted. Patient A&O x4, presents to ED transferred from [place] c/o GI evaluation. At time of assessment, patient denies pain/discomfort, denies CP/SOB. Patient updated on the plan of care, awaiting lab results and GI consult. Stretcher locked in lowest position, IV site flushed w/ NS. No redness, swelling or pain noted to site. No signs of acute distress noted, safety maintained. Pt remains on CM in NSR.

## 2023-05-02 NOTE — ED PROVIDER NOTE - CARE PLAN
Principal Discharge DX:	Elevated bilirubin  Secondary Diagnosis:	Transaminitis  Secondary Diagnosis:	Jaundice  Secondary Diagnosis:	Serum lipase elevation   1

## 2023-05-02 NOTE — ED PROVIDER NOTE - PROGRESS NOTE DETAILS
Consult for GI placed. Patient understands that she will need to be admitted. Rosa Castillo MD PGY-2

## 2023-05-02 NOTE — HISTORY OF PRESENT ILLNESS
[FreeTextEntry8] : \par 68 year old female presents c/o abdominal discomfort, abdominal bloating, reflux, fatigue\par she initially developed constipation over 2 weeks ago\par took a laxative on 4/14/23\par the next day- had a bowel movement\par then started to develop abdominal pain and bloating, worsening reflux, nausea\par went to [facility] on 4/18/23- was advised to take Simethicone and Maalox\par patient was scheduled to go to [place]- left on 4/22\par while away in [place]- had frequent bowl movements, increased abdominal bloating and discomfort, increased heartburn\par came home yesterday\par has been feeling fatigued\par also reports her urine has appeared discolored- orange

## 2023-05-02 NOTE — ED PROVIDER NOTE - ATTENDING CONTRIBUTION TO CARE
JARETH Da Silva: see mdm     I have personally performed a face to face diagnostic evaluation on this patient.  I have reviewed the resident's note and agree with the history, exam, and plan of care, except as noted.   My medical decision making and observations are found above.

## 2023-05-02 NOTE — ED ADULT NURSE REASSESSMENT NOTE - NS ED NURSE REASSESS COMMENT FT1
Assumed care of pt at 19:15 as stated in report from RN Soco. Charting as noted. Patient A&O x4, denies current pain/discomfort, denies CP/SOB. RR even and unlabored. jaundice noted to upper extremities, abdomen and sclera, pt states it started approx 1 week ago.  pt and family Updated on the plan of care. Call bell within reach, bed locked in lowest position. IV site flushed w/ NS. No redness, swelling or pain noted to site. No signs of acute distress noted, safety maintained. pt awaiting for GI consult and ERCP. Assumed care of pt at 2000 as stated in report from NAINA Wan. Charting as noted. Patient A&O x4, denies current pain/discomfort, denies CP/SOB. RR even and unlabored. jaundice noted to upper extremities, abdomen and sclera, pt states it started approx 1 week ago.  pt and family Updated on the plan of care. Call bell within reach, bed locked in lowest position. IV site flushed w/ NS. No redness, swelling or pain noted to site. No signs of acute distress noted, safety maintained. pt awaiting for GI consult and ERCP.

## 2023-05-02 NOTE — PHYSICAL EXAM
[No Acute Distress] : no acute distress [Well Nourished] : well nourished [Well Developed] : well developed [PERRL] : pupils equal round and reactive to light [Normal Appearance] : was normal in appearance [Neck Supple] : was supple [No Respiratory Distress] : no respiratory distress  [Clear to Auscultation] : lungs were clear to auscultation bilaterally [Normal Rate] : normal rate  [Regular Rhythm] : with a regular rhythm [Normal S1, S2] : normal S1 and S2 [No Murmur] : no murmur heard [Soft] : abdomen soft [Non Tender] : non-tender [Normal Bowel Sounds] : normal bowel sounds [Normal Anterior Cervical Nodes] : no anterior cervical lymphadenopathy [Alert and Oriented x3] : oriented to person, place, and time [de-identified] : scleral icterus  [de-identified] : + mildly distended, yellow hue to abdomen

## 2023-05-02 NOTE — ED ADULT NURSE NOTE - OBJECTIVE STATEMENT
PT brought in as transfer from [facility] for ERCP.  Pt reports "turning yellow" approx 2 weeks ago.  Reports abdominal pressure accompanied by intermittent n, v, d.  Denies pain.  Also reports dark urine.  Pt states she had outpt blood work done yesterday and advised by PMD to go to ER for elevated bilirubin and liver enzymes.  Pt  appears jaundice.

## 2023-05-02 NOTE — ED PROVIDER NOTE - PHYSICAL EXAMINATION
Gen: NAD, non-toxic, conversational  Eyes: PERRLA, EOMI, very mild scleral icterus   HENT: Normocephalic, atraumatic. External ears normal, no rhinorrhea, moist mucous membranes.   CV: RRR, no M/R/G  Resp: CTAB, non-labored, speaking without difficulty on room air  Abd: soft, non tender, non rigid, no guarding or rebound tenderness  Back: No CVAT bilaterally, no midline ttp  Skin: dry, wwp, jaundiced  Neuro: AOx3, speech is fluent and appropriate  Psych: Mood concerned, affect euthymic

## 2023-05-02 NOTE — ED PROVIDER NOTE - CLINICAL SUMMARY MEDICAL DECISION MAKING FREE TEXT BOX
68F hx htn on losartan presenting for evaluation of painless jaundice from [place] after having labs / imaging concerning for possible biliary obstruction ddx includes mass, mirizzi syndrome, amongst many other etiologies. Was sent here for ERCP with GI; has no complaints currently. Will d/w GI; patient will need admit for ERCP.

## 2023-05-02 NOTE — ED PROVIDER NOTE - OBJECTIVE STATEMENT
68-year-old female history of hypertension on losartan presenting for evaluation of  painless jaundice that has been progressive for the last 10 days.  Patient was seen at [facility] earlier today at that time had elevated bilirubin as well as elevated transaminases, subsequently transferred to tertiary center for evaluation with GI after being accepted by Dr. Martell for ERCP.  On initial assessment patient denies being in any discomfort, notes she is hungry, has no family history of cancer, denies medical problems other than high blood pressure for which she takes losartan.  Notes that she has still been having bowel movements however they appear greasy, she notes that sometimes they seem like globules of fat.  Did not have any recent seafood, no recent travel, no recent changes to medications, no hx of jaundice, is not a daily alcohol drinker.

## 2023-05-02 NOTE — ASSESSMENT
[FreeTextEntry1] : will check blood work\par will refer patient for STAT CT abdomen/pelvis \par reassess after results are available\par \par Addendum:\par Blood work revealed elevated LFTS including bilirubin, elevated pancreatic enzymes\par Was scheduled for CT later today, advised to go to the ER for further evaluation \par patient agreed to go- notified [place] ER triage nurse as patient will go to [facility]

## 2023-05-02 NOTE — HEALTH RISK ASSESSMENT
[0] : 2) Feeling down, depressed, or hopeless: Not at all (0) [PHQ-2 Negative - No further assessment needed] : PHQ-2 Negative - No further assessment needed [[id number]] : 0

## 2023-05-03 ENCOUNTER — TRANSCRIPTION ENCOUNTER (OUTPATIENT)
Age: 68
End: 2023-05-03

## 2023-05-03 LAB
ALBUMIN SERPL ELPH-MCNC: 3.5 G/DL — SIGNIFICANT CHANGE UP (ref 3.3–5.2)
ALP SERPL-CCNC: 379 U/L — HIGH (ref 40–120)
ALT FLD-CCNC: 240 U/L — HIGH
ANION GAP SERPL CALC-SCNC: 15 MMOL/L — SIGNIFICANT CHANGE UP (ref 5–17)
ANION GAP SERPL CALC-SCNC: 17 MMOL/L — SIGNIFICANT CHANGE UP (ref 5–17)
APTT BLD: 31.1 SEC — SIGNIFICANT CHANGE UP (ref 27.5–35.5)
AST SERPL-CCNC: 96 U/L — HIGH
BACTERIA UR CULT: NORMAL
BASOPHILS # BLD AUTO: 0.06 K/UL — SIGNIFICANT CHANGE UP (ref 0–0.2)
BASOPHILS NFR BLD AUTO: 0.6 % — SIGNIFICANT CHANGE UP (ref 0–2)
BILIRUB DIRECT SERPL-MCNC: 8.4 MG/DL — HIGH (ref 0–0.3)
BILIRUB INDIRECT FLD-MCNC: 1.5 MG/DL — HIGH (ref 0.2–1)
BILIRUB SERPL-MCNC: 9.9 MG/DL — HIGH (ref 0.4–2)
BUN SERPL-MCNC: 23.8 MG/DL — HIGH (ref 8–20)
BUN SERPL-MCNC: 23.9 MG/DL — HIGH (ref 8–20)
CALCIUM SERPL-MCNC: 9.3 MG/DL — SIGNIFICANT CHANGE UP (ref 8.4–10.5)
CALCIUM SERPL-MCNC: 9.8 MG/DL — SIGNIFICANT CHANGE UP (ref 8.4–10.5)
CHLORIDE SERPL-SCNC: 102 MMOL/L — SIGNIFICANT CHANGE UP (ref 96–108)
CHLORIDE SERPL-SCNC: 102 MMOL/L — SIGNIFICANT CHANGE UP (ref 96–108)
CO2 SERPL-SCNC: 23 MMOL/L — SIGNIFICANT CHANGE UP (ref 22–29)
CO2 SERPL-SCNC: 27 MMOL/L — SIGNIFICANT CHANGE UP (ref 22–29)
CREAT SERPL-MCNC: 0.72 MG/DL — SIGNIFICANT CHANGE UP (ref 0.5–1.3)
CREAT SERPL-MCNC: 0.75 MG/DL — SIGNIFICANT CHANGE UP (ref 0.5–1.3)
EGFR: 86 ML/MIN/1.73M2 — SIGNIFICANT CHANGE UP
EGFR: 90 ML/MIN/1.73M2 — SIGNIFICANT CHANGE UP
EOSINOPHIL # BLD AUTO: 0.09 K/UL — SIGNIFICANT CHANGE UP (ref 0–0.5)
EOSINOPHIL NFR BLD AUTO: 0.8 % — SIGNIFICANT CHANGE UP (ref 0–6)
GLUCOSE SERPL-MCNC: 180 MG/DL — HIGH (ref 70–99)
GLUCOSE SERPL-MCNC: 203 MG/DL — HIGH (ref 70–99)
HCT VFR BLD CALC: 30.7 % — LOW (ref 34.5–45)
HGB BLD-MCNC: 10.2 G/DL — LOW (ref 11.5–15.5)
IMM GRANULOCYTES NFR BLD AUTO: 0.6 % — SIGNIFICANT CHANGE UP (ref 0–0.9)
INR BLD: 1.24 RATIO — HIGH (ref 0.88–1.16)
LIDOCAIN IGE QN: 1536 U/L — HIGH (ref 22–51)
LYMPHOCYTES # BLD AUTO: 1.39 K/UL — SIGNIFICANT CHANGE UP (ref 1–3.3)
LYMPHOCYTES # BLD AUTO: 12.9 % — LOW (ref 13–44)
MAGNESIUM SERPL-MCNC: 2 MG/DL — SIGNIFICANT CHANGE UP (ref 1.6–2.6)
MCHC RBC-ENTMCNC: 27.5 PG — SIGNIFICANT CHANGE UP (ref 27–34)
MCHC RBC-ENTMCNC: 33.2 GM/DL — SIGNIFICANT CHANGE UP (ref 32–36)
MCV RBC AUTO: 82.7 FL — SIGNIFICANT CHANGE UP (ref 80–100)
MONOCYTES # BLD AUTO: 0.75 K/UL — SIGNIFICANT CHANGE UP (ref 0–0.9)
MONOCYTES NFR BLD AUTO: 7 % — SIGNIFICANT CHANGE UP (ref 2–14)
NEUTROPHILS # BLD AUTO: 8.41 K/UL — HIGH (ref 1.8–7.4)
NEUTROPHILS NFR BLD AUTO: 78.1 % — HIGH (ref 43–77)
PLATELET # BLD AUTO: 425 K/UL — HIGH (ref 150–400)
POTASSIUM SERPL-MCNC: 3.1 MMOL/L — LOW (ref 3.5–5.3)
POTASSIUM SERPL-MCNC: 3.6 MMOL/L — SIGNIFICANT CHANGE UP (ref 3.5–5.3)
POTASSIUM SERPL-SCNC: 3.1 MMOL/L — LOW (ref 3.5–5.3)
POTASSIUM SERPL-SCNC: 3.6 MMOL/L — SIGNIFICANT CHANGE UP (ref 3.5–5.3)
PROT SERPL-MCNC: 6.4 G/DL — LOW (ref 6.6–8.7)
PROTHROM AB SERPL-ACNC: 14.4 SEC — HIGH (ref 10.5–13.4)
RBC # BLD: 3.71 M/UL — LOW (ref 3.8–5.2)
RBC # FLD: 14.1 % — SIGNIFICANT CHANGE UP (ref 10.3–14.5)
SODIUM SERPL-SCNC: 142 MMOL/L — SIGNIFICANT CHANGE UP (ref 135–145)
SODIUM SERPL-SCNC: 143 MMOL/L — SIGNIFICANT CHANGE UP (ref 135–145)
WBC # BLD: 10.77 K/UL — HIGH (ref 3.8–10.5)
WBC # FLD AUTO: 10.77 K/UL — HIGH (ref 3.8–10.5)

## 2023-05-03 PROCEDURE — 12345: CPT | Mod: NC

## 2023-05-03 PROCEDURE — 99223 1ST HOSP IP/OBS HIGH 75: CPT

## 2023-05-03 RX ORDER — ERTAPENEM SODIUM 1 G/1
1000 INJECTION, POWDER, LYOPHILIZED, FOR SOLUTION INTRAMUSCULAR; INTRAVENOUS ONCE
Refills: 0 | Status: DISCONTINUED | OUTPATIENT
Start: 2023-05-04 | End: 2023-05-04

## 2023-05-03 RX ORDER — CALCIUM CARBONATE 500(1250)
1 TABLET ORAL EVERY 6 HOURS
Refills: 0 | Status: DISCONTINUED | OUTPATIENT
Start: 2023-05-03 | End: 2023-05-09

## 2023-05-03 RX ORDER — PANTOPRAZOLE SODIUM 20 MG/1
40 TABLET, DELAYED RELEASE ORAL EVERY 24 HOURS
Refills: 0 | Status: DISCONTINUED | OUTPATIENT
Start: 2023-05-03 | End: 2023-05-09

## 2023-05-03 RX ORDER — POTASSIUM CHLORIDE 20 MEQ
10 PACKET (EA) ORAL
Refills: 0 | Status: COMPLETED | OUTPATIENT
Start: 2023-05-03 | End: 2023-05-03

## 2023-05-03 RX ORDER — CALCIUM CARBONATE 500(1250)
1 TABLET ORAL ONCE
Refills: 0 | Status: COMPLETED | OUTPATIENT
Start: 2023-05-03 | End: 2023-05-03

## 2023-05-03 RX ORDER — LOSARTAN POTASSIUM 100 MG/1
100 TABLET, FILM COATED ORAL DAILY
Refills: 0 | Status: DISCONTINUED | OUTPATIENT
Start: 2023-05-03 | End: 2023-05-09

## 2023-05-03 RX ORDER — HYDRALAZINE HCL 50 MG
10 TABLET ORAL EVERY 6 HOURS
Refills: 0 | Status: DISCONTINUED | OUTPATIENT
Start: 2023-05-03 | End: 2023-05-09

## 2023-05-03 RX ORDER — INDOMETHACIN 50 MG
100 CAPSULE ORAL ONCE
Refills: 0 | Status: DISCONTINUED | OUTPATIENT
Start: 2023-05-04 | End: 2023-05-04

## 2023-05-03 RX ORDER — LOSARTAN/HYDROCHLOROTHIAZIDE 100MG-25MG
1 TABLET ORAL
Refills: 0 | DISCHARGE

## 2023-05-03 RX ADMIN — Medication 1 TABLET(S): at 18:28

## 2023-05-03 RX ADMIN — Medication 100 MILLIEQUIVALENT(S): at 05:01

## 2023-05-03 RX ADMIN — LOSARTAN POTASSIUM 100 MILLIGRAM(S): 100 TABLET, FILM COATED ORAL at 11:11

## 2023-05-03 RX ADMIN — PANTOPRAZOLE SODIUM 40 MILLIGRAM(S): 20 TABLET, DELAYED RELEASE ORAL at 11:11

## 2023-05-03 RX ADMIN — Medication 30 MILLILITER(S): at 14:43

## 2023-05-03 RX ADMIN — Medication 100 MILLIEQUIVALENT(S): at 06:21

## 2023-05-03 RX ADMIN — Medication 1 TABLET(S): at 00:59

## 2023-05-03 RX ADMIN — Medication 100 MILLIEQUIVALENT(S): at 07:34

## 2023-05-03 RX ADMIN — Medication 10 MILLIGRAM(S): at 16:16

## 2023-05-03 RX ADMIN — ONDANSETRON 4 MILLIGRAM(S): 8 TABLET, FILM COATED ORAL at 14:17

## 2023-05-03 RX ADMIN — SODIUM CHLORIDE 75 MILLILITER(S): 9 INJECTION, SOLUTION INTRAVENOUS at 11:11

## 2023-05-03 NOTE — PROGRESS NOTE ADULT - SUBJECTIVE AND OBJECTIVE BOX
Hospitalist Progress Note    Chief Complaint:  Painless Jaundice    SUBJECTIVE / OVERNIGHT EVENTS:  Admitted in early AM, patient seen at bedside in NAD, no active complaints at this time. Patient denies chest pain, SOB, abd pain, N/V, fever, chills, dysuria or any other complaints. All remainder ROS negative.     MEDICATIONS  (STANDING):  lactated ringers. 1000 milliLiter(s) (75 mL/Hr) IV Continuous <Continuous>  losartan 100 milliGRAM(s) Oral daily  pantoprazole  Injectable 40 milliGRAM(s) IV Push every 24 hours    MEDICATIONS  (PRN):  acetaminophen     Tablet .. 650 milliGRAM(s) Oral every 6 hours PRN Temp greater or equal to 38C (100.4F), Mild Pain (1 - 3)  aluminum hydroxide/magnesium hydroxide/simethicone Suspension 30 milliLiter(s) Oral every 4 hours PRN Dyspepsia  calcium carbonate    500 mG (Tums) Chewable 1 Tablet(s) Chew every 6 hours PRN Dyspepsia  hydrALAZINE Injectable 10 milliGRAM(s) IV Push every 6 hours PRN SBP >160  melatonin 3 milliGRAM(s) Oral at bedtime PRN Insomnia  ondansetron Injectable 4 milliGRAM(s) IV Push every 8 hours PRN Nausea and/or Vomiting        I&O's Summary      PHYSICAL EXAM:  Vital Signs Last 24 Hrs  T(C): 36.7 (03 May 2023 11:38), Max: 36.9 (03 May 2023 08:45)  T(F): 98.1 (03 May 2023 11:38), Max: 98.4 (03 May 2023 08:45)  HR: 77 (03 May 2023 11:38) (75 - 98)  BP: 162/74 (03 May 2023 11:38) (156/75 - 171/76)  BP(mean): 98 (02 May 2023 19:18) (98 - 98)  RR: 18 (03 May 2023 11:38) (16 - 20)  SpO2: 96% (03 May 2023 11:38) (95% - 99%)    Parameters below as of 03 May 2023 11:38  Patient On (Oxygen Delivery Method): room air            Constitutional: Well-appearing, NAD, VSS  Head: NC/AT  Eyes: PERRL, EOMI, +Scleral Icterus  ENT: Normal Pharynx, MMM, No tonsillar exudate/erythema  Neck: Supple, Non-tender  Chest: Non-tender, no rashes  Cardio: RRR, s1/s2, no appreciable murmurs/rubs/gallops  Resp: BS CTA bilaterally, no wheezing/rhonchi/rales  Abd: Soft, Non-tender, Non-distended, no rebound/guarding/rigidity  : not examined  Rectal: not examined  MSK: moving all extremities, no motor weakness, full ROM x4  Ext: palpable distal pulses, good capillary refill, no clubbing/cyanosis/edema  Psych: appropriate, cooperative  Neuro: CN II-XII grossly intact, no focal deficits  Skin: Warm/Dry. +Jaundice  LABS:                        10.2   10.77 )-----------( 425      ( 03 May 2023 02:21 )             30.7     05-03    143  |  102  |  23.9<H>  ----------------------------<  203<H>  3.6   |  27.0  |  0.72    Ca    9.8      03 May 2023 12:16  Mg     2.0     05-03    TPro  6.4<L>  /  Alb  3.5  /  TBili  9.9<H>  /  DBili  8.4<H>  /  AST  96<H>  /  ALT  240<H>  /  AlkPhos  379<H>  05-03    PT/INR - ( 03 May 2023 02:21 )   PT: 14.4 sec;   INR: 1.24 ratio         PTT - ( 03 May 2023 02:21 )  PTT:31.1 sec          CAPILLARY BLOOD GLUCOSE            RADIOLOGY & ADDITIONAL TESTS:  Results Reviewed: Y  Imaging Personally Reviewed: N  Electrocardiogram Personally Reviewed: N

## 2023-05-03 NOTE — PATIENT PROFILE ADULT - FUNCTIONAL ASSESSMENT - DAILY ACTIVITY ASSESSMENT TYPE
EEG Electrode Removal      Reason for Removal:  Study completed    Removal Agent:  Collodion Remover (acetone free), Water and Head & Shoulders shampoo    Skin Condition post-electrode removal:  No skin breakdown noted.         Admission

## 2023-05-03 NOTE — H&P ADULT - ASSESSMENT
ASSESSMENT:  68F with PMHX HTN, Tobacco Abuse presents to [place] ER c/o painless jaundice for past 10 days found to have painless obstructive jaundice with hyperbilirubinemia and transaminitis transferred to [facility] for advanced GI evaluation.    PLAN:  Painless Obstructive Jaundice with Hyperbilirubinemia and Transaminitis r/o Cholangiocarcinoma v Ampulla CA  -Admit to Medicine  -Repeat Labs  -Trend LFTs  -Trend Lipase  -IVF LR 75cc/hr  -CT and ABD US imaging reviewed in HIE  -NPO  -GI Consulted    HTN  -Hold Losartan/HCTZ for now  -Monitor BP  -Hydralazine 10mg IV q6 PRN SBP >160    Tobacco Abuse  - Cessation

## 2023-05-03 NOTE — H&P ADULT - NSHPPHYSICALEXAM_GEN_ALL_CORE
Vital Signs Last 24 Hrs  T(C): 36.8 (03 May 2023 00:52), Max: 36.8 (03 May 2023 00:52)  T(F): 98.2 (03 May 2023 00:52), Max: 98.2 (03 May 2023 00:52)  HR: 98 (03 May 2023 00:52) (90 - 98)  BP: 170/80 (03 May 2023 00:52) (156/75 - 170/80)  BP(mean): 98 (02 May 2023 19:18) (98 - 98)  RR: 18 (03 May 2023 00:52) (16 - 20)  SpO2: 96% (03 May 2023 00:52) (96% - 99%)    Parameters below as of 03 May 2023 00:52  Patient On (Oxygen Delivery Method): room air    Constitutional: Well-appearing, NAD, VSS  Head: NC/AT  Eyes: PERRL, EOMI, +Scleral Icterus  ENT: Normal Pharynx, MMM, No tonsillar exudate/erythema  Neck: Supple, Non-tender  Chest: Non-tender, no rashes  Cardio: RRR, s1/s2, no appreciable murmurs/rubs/gallops  Resp: BS CTA bilaterally, no wheezing/rhonchi/rales  Abd: Soft, Non-tender, Non-distended, no rebound/guarding/rigidity  : not examined  Rectal: not examined  MSK: moving all extremities, no motor weakness, full ROM x4  Ext: palpable distal pulses, good capillary refill, no clubbing/cyanosis/edema  Psych: appropriate, cooperative  Neuro: CN II-XII grossly intact, no focal deficits  Skin: Warm/Dry. +Jaundice

## 2023-05-03 NOTE — H&P ADULT - HISTORY OF PRESENT ILLNESS
68F with PMHX HTN, Tobacco Abuse presents to [place] ER c/o painless jaundice for past 10 days found to have painless obstructive jaundice with hyperbilirubinemia and transaminitis. Also reports unintentional weight loss. Pt seen/examined. Pt c/o heartburn. ROS +greasy BM. Denies abd pain or any other complaints. No evidence of ascending cholangitis. CT with evidence of biliary obstruction, mural enhancement involving the CBD and mild upper abdominal and left retroperitoneal lymphadenopathy. Patient transferred to [facility] ER for ERCP/EUS.    ROS negative unless mentioned.    PMHX: HTN  PSHX: Denies  FamHx: +Brother/Father DM2, +Mother/Sibling Lung CA, MI, +Father CKD, MI  Social Hx: +Active Smoker >30 pack year hx

## 2023-05-04 ENCOUNTER — TRANSCRIPTION ENCOUNTER (OUTPATIENT)
Age: 68
End: 2023-05-04

## 2023-05-04 ENCOUNTER — RESULT REVIEW (OUTPATIENT)
Age: 68
End: 2023-05-04

## 2023-05-04 LAB
ABO RH CONFIRMATION: SIGNIFICANT CHANGE UP
ANION GAP SERPL CALC-SCNC: 15 MMOL/L — SIGNIFICANT CHANGE UP (ref 5–17)
BLD GP AB SCN SERPL QL: SIGNIFICANT CHANGE UP
BUN SERPL-MCNC: 22.8 MG/DL — HIGH (ref 8–20)
CALCIUM SERPL-MCNC: 9.2 MG/DL — SIGNIFICANT CHANGE UP (ref 8.4–10.5)
CANCER AG19-9 SERPL-ACNC: 881 U/ML — HIGH
CEA SERPL-MCNC: 1.7 NG/ML — SIGNIFICANT CHANGE UP (ref 0–3.8)
CHLORIDE SERPL-SCNC: 101 MMOL/L — SIGNIFICANT CHANGE UP (ref 96–108)
CO2 SERPL-SCNC: 23 MMOL/L — SIGNIFICANT CHANGE UP (ref 22–29)
CREAT SERPL-MCNC: 0.72 MG/DL — SIGNIFICANT CHANGE UP (ref 0.5–1.3)
EGFR: 90 ML/MIN/1.73M2 — SIGNIFICANT CHANGE UP
GLUCOSE SERPL-MCNC: 144 MG/DL — HIGH (ref 70–99)
HCT VFR BLD CALC: 29.5 % — LOW (ref 34.5–45)
HCV AB S/CO SERPL IA: 0.08 S/CO — SIGNIFICANT CHANGE UP (ref 0–0.99)
HCV AB SERPL-IMP: SIGNIFICANT CHANGE UP
HGB BLD-MCNC: 9.8 G/DL — LOW (ref 11.5–15.5)
MAGNESIUM SERPL-MCNC: 1.9 MG/DL — SIGNIFICANT CHANGE UP (ref 1.6–2.6)
MCHC RBC-ENTMCNC: 27.6 PG — SIGNIFICANT CHANGE UP (ref 27–34)
MCHC RBC-ENTMCNC: 33.2 GM/DL — SIGNIFICANT CHANGE UP (ref 32–36)
MCV RBC AUTO: 83.1 FL — SIGNIFICANT CHANGE UP (ref 80–100)
PHOSPHATE SERPL-MCNC: 2.8 MG/DL — SIGNIFICANT CHANGE UP (ref 2.4–4.7)
PLATELET # BLD AUTO: 399 K/UL — SIGNIFICANT CHANGE UP (ref 150–400)
POTASSIUM SERPL-MCNC: 3.4 MMOL/L — LOW (ref 3.5–5.3)
POTASSIUM SERPL-SCNC: 3.4 MMOL/L — LOW (ref 3.5–5.3)
RBC # BLD: 3.55 M/UL — LOW (ref 3.8–5.2)
RBC # FLD: 14.3 % — SIGNIFICANT CHANGE UP (ref 10.3–14.5)
SODIUM SERPL-SCNC: 139 MMOL/L — SIGNIFICANT CHANGE UP (ref 135–145)
WBC # BLD: 10.95 K/UL — HIGH (ref 3.8–10.5)
WBC # FLD AUTO: 10.95 K/UL — HIGH (ref 3.8–10.5)

## 2023-05-04 PROCEDURE — 43242 EGD US FINE NEEDLE BX/ASPIR: CPT

## 2023-05-04 PROCEDURE — 43239 EGD BIOPSY SINGLE/MULTIPLE: CPT | Mod: 59

## 2023-05-04 PROCEDURE — 88173 CYTOPATH EVAL FNA REPORT: CPT | Mod: 26

## 2023-05-04 PROCEDURE — 88341 IMHCHEM/IMCYTCHM EA ADD ANTB: CPT | Mod: 26

## 2023-05-04 PROCEDURE — 88305 TISSUE EXAM BY PATHOLOGIST: CPT | Mod: 26

## 2023-05-04 PROCEDURE — 88342 IMHCHEM/IMCYTCHM 1ST ANTB: CPT | Mod: 26

## 2023-05-04 PROCEDURE — 74183 MRI ABD W/O CNTR FLWD CNTR: CPT | Mod: 26

## 2023-05-04 PROCEDURE — 99239 HOSP IP/OBS DSCHRG MGMT >30: CPT

## 2023-05-04 RX ORDER — POTASSIUM CHLORIDE 20 MEQ
10 PACKET (EA) ORAL
Refills: 0 | Status: DISCONTINUED | OUTPATIENT
Start: 2023-05-04 | End: 2023-05-04

## 2023-05-04 RX ORDER — POTASSIUM CHLORIDE 20 MEQ
10 PACKET (EA) ORAL
Refills: 0 | Status: COMPLETED | OUTPATIENT
Start: 2023-05-04 | End: 2023-05-04

## 2023-05-04 RX ORDER — ALPRAZOLAM 0.25 MG
0.25 TABLET ORAL EVERY 12 HOURS
Refills: 0 | Status: DISCONTINUED | OUTPATIENT
Start: 2023-05-04 | End: 2023-05-09

## 2023-05-04 RX ADMIN — Medication 100 MILLIEQUIVALENT(S): at 11:01

## 2023-05-04 RX ADMIN — Medication 100 MILLIEQUIVALENT(S): at 08:51

## 2023-05-04 RX ADMIN — LOSARTAN POTASSIUM 100 MILLIGRAM(S): 100 TABLET, FILM COATED ORAL at 05:31

## 2023-05-04 RX ADMIN — Medication 0.25 MILLIGRAM(S): at 11:16

## 2023-05-04 RX ADMIN — Medication 10 MILLIGRAM(S): at 11:01

## 2023-05-04 RX ADMIN — PANTOPRAZOLE SODIUM 40 MILLIGRAM(S): 20 TABLET, DELAYED RELEASE ORAL at 11:01

## 2023-05-04 NOTE — DISCHARGE NOTE PROVIDER - CARE PROVIDER_API CALL
Dion Martell)  Gastroenterology; Internal Medicine  [address] [place] [address]  Phone: [phone]  Fax: [phone]  Follow Up Time: 1-3 days    PMD,   PMD  Phone: (   )    -  Fax: (   )    -  Follow Up Time: 1 week   Dion Martell)  Gastroenterology; Internal Medicine  [facility], [address] [place] [address]  Phone: [phone]  Fax: [phone]  Follow Up Time: 1-3 days    PMD,   PMD  Phone: (   )    -  Fax: (   )    -  Follow Up Time: 1 week    Tigre Mckenzie)  Complex General Surgical Oncology; Surgery; Surgical Oncology  [address] [place] [address]  Phone: [phone]  Fax: [phone]  Follow Up Time: 2 weeks

## 2023-05-04 NOTE — PROGRESS NOTE ADULT - SUBJECTIVE AND OBJECTIVE BOX
Hospitalist Progress Note    Chief Complaint:  Painless Jaundice    SUBJECTIVE / OVERNIGHT EVENTS:  No events overnight, patient seen at bedside in NAD, no active complaints at this time. Patient denies chest pain, SOB, abd pain, N/V, fever, chills, dysuria or any other complaints. All remainder ROS negative.     MEDICATIONS  (STANDING):  ertapenem  IVPB 1000 milliGRAM(s) IV Intermittent once  indomethacin Suppository 100 milliGRAM(s) Rectal once  lactated ringers. 1000 milliLiter(s) (75 mL/Hr) IV Continuous <Continuous>  losartan 100 milliGRAM(s) Oral daily  pantoprazole  Injectable 40 milliGRAM(s) IV Push every 24 hours    MEDICATIONS  (PRN):  acetaminophen     Tablet .. 650 milliGRAM(s) Oral every 6 hours PRN Temp greater or equal to 38C (100.4F), Mild Pain (1 - 3)  ALPRAZolam 0.25 milliGRAM(s) Oral every 12 hours PRN Anxiety  aluminum hydroxide/magnesium hydroxide/simethicone Suspension 30 milliLiter(s) Oral every 4 hours PRN Dyspepsia  calcium carbonate    500 mG (Tums) Chewable 1 Tablet(s) Chew every 6 hours PRN Dyspepsia  hydrALAZINE Injectable 10 milliGRAM(s) IV Push every 6 hours PRN SBP >160  melatonin 3 milliGRAM(s) Oral at bedtime PRN Insomnia  ondansetron Injectable 4 milliGRAM(s) IV Push every 8 hours PRN Nausea and/or Vomiting        I&O's Summary      PHYSICAL EXAM:  Vital Signs Last 24 Hrs  T(C): 36.7 (04 May 2023 10:58), Max: 37.1 (03 May 2023 15:50)  T(F): 98.1 (04 May 2023 10:58), Max: 98.7 (03 May 2023 15:50)  HR: 86 (04 May 2023 10:58) (86 - 93)  BP: 188/77 (04 May 2023 10:58) (145/63 - 188/77)  BP(mean): --  RR: 18 (04 May 2023 00:29) (18 - 18)  SpO2: 96% (04 May 2023 10:58) (95% - 97%)    Parameters below as of 04 May 2023 10:58  Patient On (Oxygen Delivery Method): room air        Constitutional: Well-appearing, NAD, VSS  Head: NC/AT  Eyes: PERRL, EOMI, +Scleral Icterus  ENT: Normal Pharynx, MMM, No tonsillar exudate/erythema  Neck: Supple, Non-tender  Chest: Non-tender, no rashes  Cardio: RRR, s1/s2, no appreciable murmurs/rubs/gallops  Resp: BS CTA bilaterally, no wheezing/rhonchi/rales  Abd: Soft, Non-tender, Non-distended, no rebound/guarding/rigidity  : not examined  Rectal: not examined  MSK: moving all extremities, no motor weakness, full ROM x4  Ext: palpable distal pulses, good capillary refill, no clubbing/cyanosis/edema  Psych: appropriate, cooperative  Neuro: CN II-XII grossly intact, no focal deficits  Skin: Warm/Dry. +Jaundice    LABS:                        9.8    10.95 )-----------( 399      ( 04 May 2023 05:31 )             29.5     05-04    139  |  101  |  22.8<H>  ----------------------------<  144<H>  3.4<L>   |  23.0  |  0.72    Ca    9.2      04 May 2023 05:31  Phos  2.8     05-04  Mg     1.9     05-04    TPro  6.4<L>  /  Alb  3.5  /  TBili  9.9<H>  /  DBili  8.4<H>  /  AST  96<H>  /  ALT  240<H>  /  AlkPhos  379<H>  05-03    PT/INR - ( 03 May 2023 02:21 )   PT: 14.4 sec;   INR: 1.24 ratio         PTT - ( 03 May 2023 02:21 )  PTT:31.1 sec          CAPILLARY BLOOD GLUCOSE            RADIOLOGY & ADDITIONAL TESTS:  Results Reviewed: Y  Imaging Personally Reviewed: N  Electrocardiogram Personally Reviewed: N

## 2023-05-04 NOTE — DISCHARGE NOTE PROVIDER - PROVIDER TOKENS
PROVIDER:[TOKEN:[[id number]],FOLLOWUP:[1-3 days]],FREE:[LAST:[PMD],PHONE:[(   )    -],FAX:[(   )    -],ADDRESS:[PMD],FOLLOWUP:[1 week]] PROVIDER:[TOKEN:[[id number]],FOLLOWUP:[1-3 days]],FREE:[LAST:[PMD],PHONE:[(   )    -],FAX:[(   )    -],ADDRESS:[PMD],FOLLOWUP:[1 week]],PROVIDER:[TOKEN:[[id number]],FOLLOWUP:[2 weeks]]

## 2023-05-04 NOTE — DISCHARGE NOTE PROVIDER - NSDCCPCAREPLAN_GEN_ALL_CORE_FT
PRINCIPAL DISCHARGE DIAGNOSIS  Diagnosis: Obstructive jaundice  Assessment and Plan of Treatment:       SECONDARY DISCHARGE DIAGNOSES  Diagnosis: Transaminitis  Assessment and Plan of Treatment:     Diagnosis: Elevated bilirubin  Assessment and Plan of Treatment:     Diagnosis: Jaundice  Assessment and Plan of Treatment:     Diagnosis: Serum lipase elevation  Assessment and Plan of Treatment:      PRINCIPAL DISCHARGE DIAGNOSIS  Diagnosis: Obstructive jaundice  Assessment and Plan of Treatment: Resume normal diet. Avoid straining, exercise, or heavy lifting.  Take medications as instructed by prescriptions.  Please keep drain capped. IF you start to experience RUQ pain, connect drain to bag and uncap. Please call Dr. Mckenzie's office for any concerns or issues with drain.  Follow up with heme/onc  Follow-up with primary care doctor as well.   Call 911 and return to the ED for chest pain, shortness of breath, significant increase in pain, or significant change in color of surgical sites.        SECONDARY DISCHARGE DIAGNOSES  Diagnosis: Transaminitis  Assessment and Plan of Treatment:     Diagnosis: Jaundice  Assessment and Plan of Treatment:     Diagnosis: Serum lipase elevation  Assessment and Plan of Treatment:     Diagnosis: Elevated bilirubin  Assessment and Plan of Treatment:

## 2023-05-04 NOTE — DISCHARGE NOTE PROVIDER - DETAILS OF MALNUTRITION DIAGNOSIS/DIAGNOSES
This patient has been assessed with a concern for Malnutrition and was treated during this hospitalization for the following Nutrition diagnosis/diagnoses:     -  05/06/2023: Severe protein-calorie malnutrition

## 2023-05-04 NOTE — CONSULT NOTE ADULT - ASSESSMENT
Patient with painless jaundice with concern of possible distal biliary lesion versus ampullary pathology.  IV fluids.  Lactated Ringer at 75 mL/h.  Start regular diet.  Obtain CA 19-9 level.  EUS ERCP planned for tomorrow.  Discussed at length with the patient.  Avoid any Lovenox or heparin.
68F with PMHX HTN, Tobacco Abuse presents to [place] ER c/o painless jaundice for past 2 weeks, found to have painless obstructive jaundice with hyperbilirubinemia and transaminitis. Surgery was contacted for painless jaundice in the presence of CT findings showing mural enhancement of the distal CBD and narrowing, RP lymph nodes of the periportal, portocaval, celiac and gastrohepatic.  Her CBD is also dilated to 1.1cm.  She is c/o pale stools, dark urine.  Unintentional weight loss of 10 pounds over 2 weeks.  No fever or chills, no night sweats.    Recommendations:   MRCP prior to ERCP to evaluate the distal CBD in detail, spoke with MRI and ordered MRCP urgently  Spoke with GI and advised that the patient requires MRCP prior to the ERCP  CT chest on this admission to evaluate known pulmonary nodule   Trend LFTs daily

## 2023-05-04 NOTE — DISCHARGE NOTE PROVIDER - HOSPITAL COURSE
68F with PMHX HTN, Tobacco Abuse presents to [place] ER c/o painless jaundice for past 10 days found to have painless obstructive jaundice with hyperbilirubinemia and transaminitis. Also reports unintentional weight loss. Pt seen/examined. Pt c/o heartburn. ROS +greasy BM. Denies abd pain or any other complaints. No evidence of ascending cholangitis. CT with evidence of biliary obstruction, mural enhancement involving the CBD and mild upper abdominal and left retroperitoneal lymphadenopathy. Patient transferred to [facility] ER for ERCP/EUS.  PT admitted  to medicine with Painless Obstructive Jaundice with Hyperbilirubinemia and Transaminitis r/o Cholangiocarcinoma v Ampulla CA.  GI  and surgical team consulted,  MRCP prior to ERCP to evaluate the distal CBD in detail       68F with PMHX HTN, Tobacco Abuse presents to [facility] ER c/o painless jaundice for past 10 days found to have painless obstructive jaundice with hyperbilirubinemia and transaminitis. Also reports unintentional weight loss. Pt seen/examined. Pt c/o heartburn. ROS +greasy BM. Denies abd pain or any other complaints. No evidence of ascending cholangitis. CT with evidence of biliary obstruction, mural enhancement involving the CBD and mild upper abdominal and left retroperitoneal lymphadenopathy. Patient transferred to [facility] ER for ERCP/EUS.  PT admitted  to medicine with Painless Obstructive Jaundice with Hyperbilirubinemia and Transaminitis r/o Cholangiocarcinoma v Ampulla CA.  GI  and surgical team consulted,  MRCP prior to ERCP to evaluate the distal CBD in detail. 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 Pt will require further hemoc/oncology  follow up as outpatient when ready for d/c.      HPI:   68F with PMHX HTN, Tobacco Abuse presents to [place] ER c/o painless jaundice for past 10 days found to have painless obstructive jaundice with hyperbilirubinemia and transaminitis. Also reports unintentional weight loss. Pt seen/examined. Pt c/o heartburn. ROS +greasy BM. Denies abd pain or any other complaints. No evidence of ascending cholangitis. CT with evidence of biliary obstruction, mural enhancement involving the CBD and mild upper abdominal and left retroperitoneal lymphadenopathy. Patient transferred to [facility] ER for ERCP/EUS.  PT admitted  to medicine with Painless Obstructive Jaundice with Hyperbilirubinemia and Transaminitis r/o Cholangiocarcinoma v Ampulla CA.  GI  and surgical team consulted,  MRCP prior to ERCP to evaluate the distal CBD in detail. 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 Patient under went EGD/EUS on 5/5 showing Abdominal lymphadenopathy, Duodenal mass w/ FNA. IR placed billiary drain on 5/5.     Pt will require further hemoc/oncology  follow up as outpatient when ready for d/c.    HPI:   68F with PMHX HTN, Tobacco Abuse presents to [place] ER c/o painless jaundice for past 10 days found to have painless obstructive jaundice with hyperbilirubinemia and transaminitis. Also reports unintentional weight loss. Pt seen/examined. Pt c/o heartburn. ROS +greasy BM. Denies abd pain or any other complaints. No evidence of ascending cholangitis. CT with evidence of biliary obstruction, mural enhancement involving the CBD and mild upper abdominal and left retroperitoneal lymphadenopathy. Patient transferred to [facility] ER for ERCP/EUS.  PT admitted  to medicine with Painless Obstructive Jaundice with Hyperbilirubinemia and Transaminitis r/o Cholangiocarcinoma v Ampulla CA.  GI  and surgical team consulted,  MRCP prior to ERCP to evaluate the distal CBD in detail. 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 Patient under went EGD/EUS on 5/5 showing Abdominal lymphadenopathy, Duodenal mass w/ FNA. IR placed billiary drain on 5/5.     Drain is currently in place but capped. Patient will leave with drain but does not require drain care.     Pt will require further hemoc/oncology  follow up as outpatient when ready for d/c.

## 2023-05-05 DIAGNOSIS — R93.89 ABNORMAL FINDINGS ON DIAGNOSTIC IMAGING OF OTHER SPECIFIED BODY STRUCTURES: ICD-10-CM

## 2023-05-05 DIAGNOSIS — K83.1 OBSTRUCTION OF BILE DUCT: ICD-10-CM

## 2023-05-05 LAB
ALBUMIN SERPL ELPH-MCNC: 3.1 G/DL — LOW (ref 3.3–5.2)
ALP SERPL-CCNC: 320 U/L — HIGH (ref 40–120)
ALT FLD-CCNC: 168 U/L — HIGH
ANION GAP SERPL CALC-SCNC: 14 MMOL/L — SIGNIFICANT CHANGE UP (ref 5–17)
AST SERPL-CCNC: 70 U/L — HIGH
BASOPHILS # BLD AUTO: 0.02 K/UL — SIGNIFICANT CHANGE UP (ref 0–0.2)
BASOPHILS NFR BLD AUTO: 0.2 % — SIGNIFICANT CHANGE UP (ref 0–2)
BILIRUB DIRECT SERPL-MCNC: 6.3 MG/DL — HIGH (ref 0–0.3)
BILIRUB INDIRECT FLD-MCNC: 2 MG/DL — HIGH (ref 0.2–1)
BILIRUB SERPL-MCNC: 8.3 MG/DL — HIGH (ref 0.4–2)
BUN SERPL-MCNC: 23.1 MG/DL — HIGH (ref 8–20)
CALCIUM SERPL-MCNC: 8.9 MG/DL — SIGNIFICANT CHANGE UP (ref 8.4–10.5)
CHLORIDE SERPL-SCNC: 104 MMOL/L — SIGNIFICANT CHANGE UP (ref 96–108)
CO2 SERPL-SCNC: 23 MMOL/L — SIGNIFICANT CHANGE UP (ref 22–29)
CREAT SERPL-MCNC: 0.52 MG/DL — SIGNIFICANT CHANGE UP (ref 0.5–1.3)
EGFR: 101 ML/MIN/1.73M2 — SIGNIFICANT CHANGE UP
EOSINOPHIL # BLD AUTO: 0.01 K/UL — SIGNIFICANT CHANGE UP (ref 0–0.5)
EOSINOPHIL NFR BLD AUTO: 0.1 % — SIGNIFICANT CHANGE UP (ref 0–6)
GLUCOSE SERPL-MCNC: 136 MG/DL — HIGH (ref 70–99)
HCT VFR BLD CALC: 29.4 % — LOW (ref 34.5–45)
HGB BLD-MCNC: 9.9 G/DL — LOW (ref 11.5–15.5)
IMM GRANULOCYTES NFR BLD AUTO: 0.5 % — SIGNIFICANT CHANGE UP (ref 0–0.9)
LYMPHOCYTES # BLD AUTO: 1.15 K/UL — SIGNIFICANT CHANGE UP (ref 1–3.3)
LYMPHOCYTES # BLD AUTO: 11.7 % — LOW (ref 13–44)
MAGNESIUM SERPL-MCNC: 1.8 MG/DL — SIGNIFICANT CHANGE UP (ref 1.6–2.6)
MCHC RBC-ENTMCNC: 28 PG — SIGNIFICANT CHANGE UP (ref 27–34)
MCHC RBC-ENTMCNC: 33.7 GM/DL — SIGNIFICANT CHANGE UP (ref 32–36)
MCV RBC AUTO: 83.1 FL — SIGNIFICANT CHANGE UP (ref 80–100)
MONOCYTES # BLD AUTO: 0.69 K/UL — SIGNIFICANT CHANGE UP (ref 0–0.9)
MONOCYTES NFR BLD AUTO: 7 % — SIGNIFICANT CHANGE UP (ref 2–14)
NEUTROPHILS # BLD AUTO: 7.9 K/UL — HIGH (ref 1.8–7.4)
NEUTROPHILS NFR BLD AUTO: 80.5 % — HIGH (ref 43–77)
PHOSPHATE SERPL-MCNC: 4.4 MG/DL — SIGNIFICANT CHANGE UP (ref 2.4–4.7)
PLATELET # BLD AUTO: 339 K/UL — SIGNIFICANT CHANGE UP (ref 150–400)
POTASSIUM SERPL-MCNC: 4.5 MMOL/L — SIGNIFICANT CHANGE UP (ref 3.5–5.3)
POTASSIUM SERPL-SCNC: 4.5 MMOL/L — SIGNIFICANT CHANGE UP (ref 3.5–5.3)
PROT SERPL-MCNC: 6 G/DL — LOW (ref 6.6–8.7)
RBC # BLD: 3.54 M/UL — LOW (ref 3.8–5.2)
RBC # FLD: 14.5 % — SIGNIFICANT CHANGE UP (ref 10.3–14.5)
SODIUM SERPL-SCNC: 141 MMOL/L — SIGNIFICANT CHANGE UP (ref 135–145)
WBC # BLD: 9.82 K/UL — SIGNIFICANT CHANGE UP (ref 3.8–10.5)
WBC # FLD AUTO: 9.82 K/UL — SIGNIFICANT CHANGE UP (ref 3.8–10.5)

## 2023-05-05 PROCEDURE — 47534 PLMT BILIARY DRAINAGE CATH: CPT

## 2023-05-05 PROCEDURE — 99232 SBSQ HOSP IP/OBS MODERATE 35: CPT

## 2023-05-05 RX ORDER — ENOXAPARIN SODIUM 100 MG/ML
40 INJECTION SUBCUTANEOUS EVERY 24 HOURS
Refills: 0 | Status: DISCONTINUED | OUTPATIENT
Start: 2023-05-05 | End: 2023-05-09

## 2023-05-05 RX ORDER — POLYETHYLENE GLYCOL 3350 17 G/17G
17 POWDER, FOR SOLUTION ORAL DAILY
Refills: 0 | Status: DISCONTINUED | OUTPATIENT
Start: 2023-05-05 | End: 2023-05-09

## 2023-05-05 RX ORDER — SENNA PLUS 8.6 MG/1
2 TABLET ORAL AT BEDTIME
Refills: 0 | Status: DISCONTINUED | OUTPATIENT
Start: 2023-05-05 | End: 2023-05-09

## 2023-05-05 RX ADMIN — Medication 650 MILLIGRAM(S): at 17:29

## 2023-05-05 RX ADMIN — ENOXAPARIN SODIUM 40 MILLIGRAM(S): 100 INJECTION SUBCUTANEOUS at 17:30

## 2023-05-05 RX ADMIN — SENNA PLUS 2 TABLET(S): 8.6 TABLET ORAL at 21:42

## 2023-05-05 RX ADMIN — LOSARTAN POTASSIUM 100 MILLIGRAM(S): 100 TABLET, FILM COATED ORAL at 06:18

## 2023-05-05 RX ADMIN — POLYETHYLENE GLYCOL 3350 17 GRAM(S): 17 POWDER, FOR SOLUTION ORAL at 21:42

## 2023-05-05 NOTE — PROGRESS NOTE ADULT - SUBJECTIVE AND OBJECTIVE BOX
Chief Complaint:  Patient is a 68y old  Female who presents with a chief complaint of Painless Jaundice (04 May 2023 14:07)      HPI/ 24 hr events: Patient seen and examined at bedside, no overnight events. Pt feeling well this morning, just mildly anxious. S/p EGD/EUS yesterday showing Abdominal lymphadenopathy, Duodenal mass w/ FNA. Today scheduled for possible internal/external drain placement with IR. Vitals are stable, no leukocytosis, Hemoglobin stable, LFTs trending down. Denies nausea, vomiting, abdominal pain.      REVIEW OF SYSTEMS:   General: Negative  HEENT: Negative  CV: Negative  Respiratory: Negative  GI: See HPI  : Negative  MSK: Negative  Hematologic: Negative  Skin: Negative    MEDICATIONS:   MEDICATIONS  (STANDING):  lactated ringers. 1000 milliLiter(s) (75 mL/Hr) IV Continuous <Continuous>  losartan 100 milliGRAM(s) Oral daily  pantoprazole  Injectable 40 milliGRAM(s) IV Push every 24 hours    MEDICATIONS  (PRN):  acetaminophen     Tablet .. 650 milliGRAM(s) Oral every 6 hours PRN Temp greater or equal to 38C (100.4F), Mild Pain (1 - 3)  ALPRAZolam 0.25 milliGRAM(s) Oral every 12 hours PRN Anxiety  aluminum hydroxide/magnesium hydroxide/simethicone Suspension 30 milliLiter(s) Oral every 4 hours PRN Dyspepsia  calcium carbonate    500 mG (Tums) Chewable 1 Tablet(s) Chew every 6 hours PRN Dyspepsia  hydrALAZINE Injectable 10 milliGRAM(s) IV Push every 6 hours PRN SBP >160  melatonin 3 milliGRAM(s) Oral at bedtime PRN Insomnia  ondansetron Injectable 4 milliGRAM(s) IV Push every 8 hours PRN Nausea and/or Vomiting      ALLERGIES:   Allergies    No Known Allergies    Intolerances        VITAL SIGNS:   Vital Signs Last 24 Hrs  T(C): 36.8 (05 May 2023 00:12), Max: 36.8 (05 May 2023 00:12)  T(F): 98.2 (05 May 2023 00:12), Max: 98.2 (05 May 2023 00:12)  HR: 89 (05 May 2023 00:12) (86 - 100)  BP: 154/69 (05 May 2023 00:12) (151/71 - 188/77)  BP(mean): --  RR: 18 (05 May 2023 00:12) (18 - 18)  SpO2: 95% (05 May 2023 00:12) (95% - 96%)    Parameters below as of 05 May 2023 00:12  Patient On (Oxygen Delivery Method): room air      I&O's Summary      PHYSICAL EXAM:   GENERAL:  No acute distress  HEENT:  NC/AT, conjunctiva clear, sclera icteric  CHEST:  No increased effort  HEART:  Regular rhythm  ABDOMEN:  Soft, non-tender, non-distended, normoactive bowel sounds  EXTREMITIES: No edema  SKIN:  Warm, dry  NEURO:  Calm, cooperative        LABS:  CBC Full  -  ( 05 May 2023 05:57 )  WBC Count : 9.82 K/uL  RBC Count : 3.54 M/uL  Hemoglobin : 9.9 g/dL  Hematocrit : 29.4 %  Platelet Count - Automated : 339 K/uL  Mean Cell Volume : 83.1 fl  Mean Cell Hemoglobin : 28.0 pg  Mean Cell Hemoglobin Concentration : 33.7 gm/dL  Auto Neutrophil # : 7.90 K/uL  Auto Lymphocyte # : 1.15 K/uL  Auto Monocyte # : 0.69 K/uL  Auto Eosinophil # : 0.01 K/uL  Auto Basophil # : 0.02 K/uL  Auto Neutrophil % : 80.5 %  Auto Lymphocyte % : 11.7 %  Auto Monocyte % : 7.0 %  Auto Eosinophil % : 0.1 %  Auto Basophil % : 0.2 %    05-05    141  |  104  |  23.1<H>  ----------------------------<  136<H>  4.5   |  23.0  |  0.52    Ca    8.9      05 May 2023 05:57  Phos  4.4     05-05  Mg     1.8     05-05    TPro  6.0<L>  /  Alb  3.1<L>  /  TBili  8.3<H>  /  DBili  6.3<H>  /  AST  70<H>  /  ALT  168<H>  /  AlkPhos  320<H>  05-05    LIVER FUNCTIONS - ( 05 May 2023 05:57 )  Alb: 3.1 g/dL / Pro: 6.0 g/dL / ALK PHOS: 320 U/L / ALT: 168 U/L / AST: 70 U/L / GGT: x                     CA 19-9  881 U/mL  AFP  --  CEA  1.7 ng/mL    --  05-04-23 @ 05:31    Hepatitis C Virus S/CO Ratio: 0.08 S/CO (05-04-23 @ 05:31)          RADIOLOGY & ADDITIONAL STUDIES:          < from: EGD w/ EUS (05.04.23 @ 00:00) >    EGD-EUS (Upper) Procedure Report    Date: 5/4/2023    Patient Name: ALLISON EPPERSON    MRN #: [id number]      Endoscopy Findings:      An upper endoscopy was performed, and the esophagus was normal. Mildly irregular    Z line was noted. Small amount of food material was noted. Erythematous mucosa    was noted in the stomach. On retroflexion, GE junction was normal. There was    fungating mass noted in duodenal bulb and sweep leading to the obstruction.    Second portion could not be reached.        Endoscopic Ultrasound Findings:        The gastroscope was then exchanged for the echoendoscope and linear    echoendoscope was used to scan the pancreatic parenchyma. The pancreatic body    and tail were initially examined from the gastric body and fundus revealing no    parenchymal abnormalities. There were multiple lymph nodes noted around the    celiac artery. FNA was performed. Olympus 22 gauge EZ shot needle, 3 passes were    obtained. The common bile duct was also traced from the hilum to the HOP, no    stones were noted. The HOP could not be assessed. The scope was then removed.        Impressions:    Abdominal lymphadenopathy.    Duodenal mass.        Plan:    Await pathology results.    Return to floor for further management    Refer to Interventional Radiology    Follow-up with surgical oncology team      Dion Martell MD        Version 1, Electronically signed on 5/4/2023 5:28:49 PM by Dion Martell MD    < end of copied text >        ACC: [id number] EXAM:  MR MRCP WAW IC   ORDERED BY: REVA AGUILERA     PROCEDURE DATE:  05/04/2023      FINDINGS:  LOWER CHEST: Within normal limits.    LIVER: Within normal limits.  BILE DUCTS: There is moderate intra and extrahepatic biliary dilatation   with abrupt cut off at the level of the distalcommon bile duct and   ampulla. The common bile duct measures 17 mm in diameter. There is no   evidence of filling defect in the common bile duct to suggest   choledocholithiasis. There is T2 hypointense delayed circumferential wall   thickening and enhancement of the distal common bile duct and ampulla.  GALLBLADDER: Distended. Cystic changes in the collapsed fundus compatible   with adenomyomatosis.  SPLEEN: Within normal limits.  PANCREAS: There is mild diffuse edematous fullness of the pancreas with   trace peripancreatic stranding extending into the retroperitoneum,   compatible with pancreatitis. There is mild stranding extending along the   pancreatic duodenal groove. There is subtle irregular dilatation of the   pancreatic duct measuring up to 6 mm in the neck. There is no evidence of   pancreatic necrosis.  ADRENALS: Within normal limits.  KIDNEYS/URETERS: Mild bilateral perinephric stranding. Otherwise, within   normal limits.    VISUALIZED PORTIONS:  BOWEL: Mild concentric wall thickening of the second portion of the   duodenum. No evidence of bowel obstruction.  PERITONEUM: No ascites.  VESSELS: Within normal limits.  RETROPERITONEUM/LYMPH NODES: Again are noted prominent T2 hyperintense   mildly enhancing centrally necrotic portillo hepatis, portacaval,   retroperitoneal, para-aortic, aortocaval, and bilateral iliac chain lymph   nodes.  ABDOMINAL WALL: Within normal limits.  BONES: Within normal limits.    IMPRESSION: Circumferential wall thickening and delayed enhancement of   the distal common bile duct and ampulla with intra and extrahepatic   biliary dilatation and pancreatic ductal dilatation. Mild acute   pancreatitis. Pathologically enlarged centrally necrotic portillo hepatis,   portacaval, and retroperitoneal lymph nodes. 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 No   evidence of metastatic disease to the liver.    --- End of Report ---           Chief Complaint:  Patient is a 68y old  Female who presents with a chief complaint of Painless Jaundice (04 May 2023 14:07)      HPI/ 24 hr events: Patient seen and examined at bedside, no overnight events. Pt feeling well this morning, just mildly anxious. S/p EGD/EUS yesterday showing Abdominal lymphadenopathy, Duodenal mass w/ FNA. Today scheduled for possible internal/external drain placement with IR. Vitals are stable, no leukocytosis, Hemoglobin stable, LFTs trending down. Denies nausea, vomiting, abdominal pain.      REVIEW OF SYSTEMS:   General: Negative  HEENT: Negative  CV: Negative  Respiratory: Negative  GI: See HPI  : Negative  MSK: Negative  Hematologic: Negative  Skin: Negative    MEDICATIONS:   MEDICATIONS  (STANDING):  lactated ringers. 1000 milliLiter(s) (75 mL/Hr) IV Continuous <Continuous>  losartan 100 milliGRAM(s) Oral daily  pantoprazole  Injectable 40 milliGRAM(s) IV Push every 24 hours    MEDICATIONS  (PRN):  acetaminophen     Tablet .. 650 milliGRAM(s) Oral every 6 hours PRN Temp greater or equal to 38C (100.4F), Mild Pain (1 - 3)  ALPRAZolam 0.25 milliGRAM(s) Oral every 12 hours PRN Anxiety  aluminum hydroxide/magnesium hydroxide/simethicone Suspension 30 milliLiter(s) Oral every 4 hours PRN Dyspepsia  calcium carbonate    500 mG (Tums) Chewable 1 Tablet(s) Chew every 6 hours PRN Dyspepsia  hydrALAZINE Injectable 10 milliGRAM(s) IV Push every 6 hours PRN SBP >160  melatonin 3 milliGRAM(s) Oral at bedtime PRN Insomnia  ondansetron Injectable 4 milliGRAM(s) IV Push every 8 hours PRN Nausea and/or Vomiting      ALLERGIES:   Allergies    No Known Allergies    Intolerances        VITAL SIGNS:   Vital Signs Last 24 Hrs  T(C): 36.8 (05 May 2023 00:12), Max: 36.8 (05 May 2023 00:12)  T(F): 98.2 (05 May 2023 00:12), Max: 98.2 (05 May 2023 00:12)  HR: 89 (05 May 2023 00:12) (86 - 100)  BP: 154/69 (05 May 2023 00:12) (151/71 - 188/77)  BP(mean): --  RR: 18 (05 May 2023 00:12) (18 - 18)  SpO2: 95% (05 May 2023 00:12) (95% - 96%)    Parameters below as of 05 May 2023 00:12  Patient On (Oxygen Delivery Method): room air      I&O's Summary      PHYSICAL EXAM:   GENERAL:  No acute distress  HEENT:  NC/AT, conjunctiva clear, sclera icteric  CHEST:  No increased effort  HEART:  Regular rhythm  ABDOMEN:  Soft, non-tender, non-distended, normoactive bowel sounds  EXTREMITIES: No edema  SKIN:  Warm, dry  NEURO:  Calm, cooperative        LABS:  CBC Full  -  ( 05 May 2023 05:57 )  WBC Count : 9.82 K/uL  RBC Count : 3.54 M/uL  Hemoglobin : 9.9 g/dL  Hematocrit : 29.4 %  Platelet Count - Automated : 339 K/uL  Mean Cell Volume : 83.1 fl  Mean Cell Hemoglobin : 28.0 pg  Mean Cell Hemoglobin Concentration : 33.7 gm/dL  Auto Neutrophil # : 7.90 K/uL  Auto Lymphocyte # : 1.15 K/uL  Auto Monocyte # : 0.69 K/uL  Auto Eosinophil # : 0.01 K/uL  Auto Basophil # : 0.02 K/uL  Auto Neutrophil % : 80.5 %  Auto Lymphocyte % : 11.7 %  Auto Monocyte % : 7.0 %  Auto Eosinophil % : 0.1 %  Auto Basophil % : 0.2 %    05-05    141  |  104  |  23.1<H>  ----------------------------<  136<H>  4.5   |  23.0  |  0.52    Ca    8.9      05 May 2023 05:57  Phos  4.4     05-05  Mg     1.8     05-05    TPro  6.0<L>  /  Alb  3.1<L>  /  TBili  8.3<H>  /  DBili  6.3<H>  /  AST  70<H>  /  ALT  168<H>  /  AlkPhos  320<H>  05-05    LIVER FUNCTIONS - ( 05 May 2023 05:57 )  Alb: 3.1 g/dL / Pro: 6.0 g/dL / ALK PHOS: 320 U/L / ALT: 168 U/L / AST: 70 U/L / GGT: x                     CA 19-9  881 U/mL  AFP  --  CEA  1.7 ng/mL    --  05-04-23 @ 05:31    Hepatitis C Virus S/CO Ratio: 0.08 S/CO (05-04-23 @ 05:31)          RADIOLOGY & ADDITIONAL STUDIES:          < from: EGD w/ EUS (05.04.23 @ 00:00) >    EGD-EUS (Upper) Procedure Report    Date: 5/4/2023    Patient Name: ALLISON EPPERSON    MRN #: [id number]      Endoscopy Findings:      An upper endoscopy was performed, and the esophagus was normal. Mildly irregular    Z line was noted. Small amount of food material was noted. Erythematous mucosa    was noted in the stomach. On retroflexion, GE junction was normal. There was    fungating mass noted in duodenal bulb and sweep leading to the obstruction.    Second portion could not be reached.        Endoscopic Ultrasound Findings:        The gastroscope was then exchanged for the echoendoscope and linear    echoendoscope was used to scan the pancreatic parenchyma. The pancreatic body    and tail were initially examined from the gastric body and fundus revealing no    parenchymal abnormalities. There were multiple lymph nodes noted around the    celiac artery. FNA was performed. Olympus 22 gauge EZ shot needle, 3 passes were    obtained. The common bile duct was also traced from the hilum to the HOP, no    stones were noted. The HOP could not be assessed. The scope was then removed.        Impressions:    Abdominal lymphadenopathy.    Duodenal mass.        Plan:    Await pathology results.    Return to floor for further management    Refer to Interventional Radiology    Follow-up with surgical oncology team      Dion Martell MD        Version 1, Electronically signed on 5/4/2023 5:28:49 PM by Dion Martell MD    < end of copied text >        ACC: [id number] EXAM:  MR MRCP WAW IC   ORDERED BY: REVA AGUILERA     PROCEDURE DATE:  05/04/2023      FINDINGS:  LOWER CHEST: Within normal limits.    LIVER: Within normal limits.  BILE DUCTS: There is moderate intra and extrahepatic biliary dilatation   with abrupt cut off at the level of the distal common bile duct and   ampulla. The common bile duct measures 17 mm in diameter. There is no   evidence of filling defect in the common bile duct to suggest   choledocholithiasis. There is T2 hypointense delayed circumferential wall   thickening and enhancement of the distal common bile duct and ampulla.  GALLBLADDER: Distended. Cystic changes in the collapsed fundus compatible   with adenomyomatosis.  SPLEEN: Within normal limits.  PANCREAS: There is mild diffuse edematous fullness of the pancreas with   trace peripancreatic stranding extending into the retroperitoneum,   compatible with pancreatitis. There is mild stranding extending along the   pancreatic duodenal groove. There is subtle irregular dilatation of the   pancreatic duct measuring up to 6 mm in the neck. There is no evidence of   pancreatic necrosis.  ADRENALS: Within normal limits.  KIDNEYS/URETERS: Mild bilateral perinephric stranding. Otherwise, within   normal limits.    VISUALIZED PORTIONS:  BOWEL: Mild concentric wall thickening of the second portion of the   duodenum. No evidence of bowel obstruction.  PERITONEUM: No ascites.  VESSELS: Within normal limits.  RETROPERITONEUM/LYMPH NODES: Again are noted prominent T2 hyperintense   mildly enhancing centrally necrotic portillo hepatis, portacaval,   retroperitoneal, para-aortic, aortocaval, and bilateral iliac chain lymph   nodes.  ABDOMINAL WALL: Within normal limits.  BONES: Within normal limits.    IMPRESSION: Circumferential wall thickening and delayed enhancement of   the distal common bile duct and ampulla with intra and extrahepatic   biliary dilatation and pancreatic ductal dilatation. Mild acute   pancreatitis. Pathologically enlarged centrally necrotic portillo hepatis,   portacaval, and retroperitoneal lymph nodes. 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 No   evidence of metastatic disease to the liver.    --- End of Report ---

## 2023-05-05 NOTE — PROGRESS NOTE ADULT - PROBLEM SELECTOR PLAN 1
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 For PTC today with biliary drainage. Follow up with Surgical Oncology. Repeat labs ordered for the AM.

## 2023-05-05 NOTE — PROGRESS NOTE ADULT - SUBJECTIVE AND OBJECTIVE BOX
Subjective:  Pt offers no acute complaints. Denies abdominal pain, chest pain, fever/chills, shortness of breath, nausea, vomiting, diarrhea, headache.     STATUS POST:  ERCP, EUS    POST OPERATIVE DAY #: 1     MEDICATIONS  (STANDING):  lactated ringers. 1000 milliLiter(s) (75 mL/Hr) IV Continuous <Continuous>  losartan 100 milliGRAM(s) Oral daily  pantoprazole  Injectable 40 milliGRAM(s) IV Push every 24 hours    MEDICATIONS  (PRN):  acetaminophen     Tablet .. 650 milliGRAM(s) Oral every 6 hours PRN Temp greater or equal to 38C (100.4F), Mild Pain (1 - 3)  ALPRAZolam 0.25 milliGRAM(s) Oral every 12 hours PRN Anxiety  aluminum hydroxide/magnesium hydroxide/simethicone Suspension 30 milliLiter(s) Oral every 4 hours PRN Dyspepsia  calcium carbonate    500 mG (Tums) Chewable 1 Tablet(s) Chew every 6 hours PRN Dyspepsia  hydrALAZINE Injectable 10 milliGRAM(s) IV Push every 6 hours PRN SBP >160  melatonin 3 milliGRAM(s) Oral at bedtime PRN Insomnia  ondansetron Injectable 4 milliGRAM(s) IV Push every 8 hours PRN Nausea and/or Vomiting      Vital Signs Last 24 Hrs  T(C): 36.8 (05 May 2023 00:12), Max: 36.8 (05 May 2023 00:12)  T(F): 98.2 (05 May 2023 00:12), Max: 98.2 (05 May 2023 00:12)  HR: 89 (05 May 2023 00:12) (86 - 100)  BP: 154/69 (05 May 2023 00:12) (151/71 - 188/77)  BP(mean): --  RR: 18 (05 May 2023 00:12) (18 - 18)  SpO2: 95% (05 May 2023 00:12) (95% - 96%)    Parameters below as of 05 May 2023 00:12  Patient On (Oxygen Delivery Method): room air        Physical Exam:    Constitutional: NAD  HEENT: PERRL, EOMI  Neck: No JVD, FROM without pain  Respiratory: Respirations non-labored, no accessory muscle use  Gastrointestinal: Soft, non-tender, non-distended  Extremities: No peripheral edema, No cyanosis  Neurological: A&O x 3; without gross deficit  Musculoskeletal: No joint pain, swelling, deformity, or point tenderness; no limitation of movement      LABS:                        9.9    9.82  )-----------( 339      ( 05 May 2023 05:57 )             29.4           A: 68F with duodenal mass, admitted with obstructive jaundice s/p ERCP/EUS    Plan:   -Plan for IR possible internal/external drain today  -NPO  -IVF  -Holding chemical DVT ppx for now, will restart post procedure  -Awaiting Labs  -OOB  -IS

## 2023-05-06 LAB
ALBUMIN SERPL ELPH-MCNC: 3.5 G/DL — SIGNIFICANT CHANGE UP (ref 3.3–5.2)
ALP SERPL-CCNC: 304 U/L — HIGH (ref 40–120)
ALT FLD-CCNC: 165 U/L — HIGH
ANION GAP SERPL CALC-SCNC: 13 MMOL/L — SIGNIFICANT CHANGE UP (ref 5–17)
AST SERPL-CCNC: 57 U/L — HIGH
BASOPHILS # BLD AUTO: 0.04 K/UL — SIGNIFICANT CHANGE UP (ref 0–0.2)
BASOPHILS NFR BLD AUTO: 0.3 % — SIGNIFICANT CHANGE UP (ref 0–2)
BILIRUB SERPL-MCNC: 5 MG/DL — HIGH (ref 0.4–2)
BUN SERPL-MCNC: 24 MG/DL — HIGH (ref 8–20)
CALCIUM SERPL-MCNC: 9.3 MG/DL — SIGNIFICANT CHANGE UP (ref 8.4–10.5)
CHLORIDE SERPL-SCNC: 103 MMOL/L — SIGNIFICANT CHANGE UP (ref 96–108)
CO2 SERPL-SCNC: 24 MMOL/L — SIGNIFICANT CHANGE UP (ref 22–29)
CREAT SERPL-MCNC: 0.84 MG/DL — SIGNIFICANT CHANGE UP (ref 0.5–1.3)
EGFR: 75 ML/MIN/1.73M2 — SIGNIFICANT CHANGE UP
EOSINOPHIL # BLD AUTO: 0.17 K/UL — SIGNIFICANT CHANGE UP (ref 0–0.5)
EOSINOPHIL NFR BLD AUTO: 1.4 % — SIGNIFICANT CHANGE UP (ref 0–6)
GLUCOSE SERPL-MCNC: 135 MG/DL — HIGH (ref 70–99)
HCT VFR BLD CALC: 32.2 % — LOW (ref 34.5–45)
HGB BLD-MCNC: 10.7 G/DL — LOW (ref 11.5–15.5)
IMM GRANULOCYTES NFR BLD AUTO: 0.6 % — SIGNIFICANT CHANGE UP (ref 0–0.9)
INR BLD: 1.2 RATIO — HIGH (ref 0.88–1.16)
LYMPHOCYTES # BLD AUTO: 1.51 K/UL — SIGNIFICANT CHANGE UP (ref 1–3.3)
LYMPHOCYTES # BLD AUTO: 12 % — LOW (ref 13–44)
MAGNESIUM SERPL-MCNC: 1.9 MG/DL — SIGNIFICANT CHANGE UP (ref 1.8–2.6)
MCHC RBC-ENTMCNC: 27.4 PG — SIGNIFICANT CHANGE UP (ref 27–34)
MCHC RBC-ENTMCNC: 33.2 GM/DL — SIGNIFICANT CHANGE UP (ref 32–36)
MCV RBC AUTO: 82.6 FL — SIGNIFICANT CHANGE UP (ref 80–100)
MONOCYTES # BLD AUTO: 0.71 K/UL — SIGNIFICANT CHANGE UP (ref 0–0.9)
MONOCYTES NFR BLD AUTO: 5.6 % — SIGNIFICANT CHANGE UP (ref 2–14)
NEUTROPHILS # BLD AUTO: 10.08 K/UL — HIGH (ref 1.8–7.4)
NEUTROPHILS NFR BLD AUTO: 80.1 % — HIGH (ref 43–77)
PHOSPHATE SERPL-MCNC: 3.7 MG/DL — SIGNIFICANT CHANGE UP (ref 2.4–4.7)
PLATELET # BLD AUTO: 432 K/UL — HIGH (ref 150–400)
POTASSIUM SERPL-MCNC: 3.9 MMOL/L — SIGNIFICANT CHANGE UP (ref 3.5–5.3)
POTASSIUM SERPL-SCNC: 3.9 MMOL/L — SIGNIFICANT CHANGE UP (ref 3.5–5.3)
PROT SERPL-MCNC: 6.7 G/DL — SIGNIFICANT CHANGE UP (ref 6.6–8.7)
PROTHROM AB SERPL-ACNC: 13.9 SEC — HIGH (ref 10.5–13.4)
RBC # BLD: 3.9 M/UL — SIGNIFICANT CHANGE UP (ref 3.8–5.2)
RBC # FLD: 15 % — HIGH (ref 10.3–14.5)
SODIUM SERPL-SCNC: 140 MMOL/L — SIGNIFICANT CHANGE UP (ref 135–145)
WBC # BLD: 12.59 K/UL — HIGH (ref 3.8–10.5)
WBC # FLD AUTO: 12.59 K/UL — HIGH (ref 3.8–10.5)

## 2023-05-06 PROCEDURE — 99232 SBSQ HOSP IP/OBS MODERATE 35: CPT

## 2023-05-06 RX ORDER — SODIUM CHLORIDE 9 MG/ML
500 INJECTION, SOLUTION INTRAVENOUS ONCE
Refills: 0 | Status: COMPLETED | OUTPATIENT
Start: 2023-05-06 | End: 2023-05-06

## 2023-05-06 RX ORDER — MAGNESIUM SULFATE 500 MG/ML
1 VIAL (ML) INJECTION ONCE
Refills: 0 | Status: COMPLETED | OUTPATIENT
Start: 2023-05-06 | End: 2023-05-06

## 2023-05-06 RX ORDER — SODIUM CHLORIDE 9 MG/ML
1000 INJECTION, SOLUTION INTRAVENOUS ONCE
Refills: 0 | Status: COMPLETED | OUTPATIENT
Start: 2023-05-06 | End: 2023-05-06

## 2023-05-06 RX ADMIN — SENNA PLUS 2 TABLET(S): 8.6 TABLET ORAL at 21:37

## 2023-05-06 RX ADMIN — LOSARTAN POTASSIUM 100 MILLIGRAM(S): 100 TABLET, FILM COATED ORAL at 05:27

## 2023-05-06 RX ADMIN — SODIUM CHLORIDE 1000 MILLILITER(S): 9 INJECTION, SOLUTION INTRAVENOUS at 21:37

## 2023-05-06 RX ADMIN — SODIUM CHLORIDE 500 MILLILITER(S): 9 INJECTION, SOLUTION INTRAVENOUS at 07:05

## 2023-05-06 RX ADMIN — Medication 10 MILLIGRAM(S): at 01:28

## 2023-05-06 RX ADMIN — PANTOPRAZOLE SODIUM 40 MILLIGRAM(S): 20 TABLET, DELAYED RELEASE ORAL at 12:06

## 2023-05-06 RX ADMIN — Medication 100 GRAM(S): at 12:09

## 2023-05-06 RX ADMIN — Medication 10 MILLIGRAM(S): at 12:06

## 2023-05-06 RX ADMIN — ENOXAPARIN SODIUM 40 MILLIGRAM(S): 100 INJECTION SUBCUTANEOUS at 16:27

## 2023-05-06 NOTE — DIETITIAN INITIAL EVALUATION ADULT - PERTINENT MEDS FT
MEDICATIONS  (STANDING):  enoxaparin Injectable 40 milliGRAM(s) SubCutaneous every 24 hours  losartan 100 milliGRAM(s) Oral daily  pantoprazole  Injectable 40 milliGRAM(s) IV Push every 24 hours  senna 2 Tablet(s) Oral at bedtime    MEDICATIONS  (PRN):  acetaminophen     Tablet .. 650 milliGRAM(s) Oral every 6 hours PRN Temp greater or equal to 38C (100.4F), Mild Pain (1 - 3)  ALPRAZolam 0.25 milliGRAM(s) Oral every 12 hours PRN Anxiety  aluminum hydroxide/magnesium hydroxide/simethicone Suspension 30 milliLiter(s) Oral every 4 hours PRN Dyspepsia  calcium carbonate    500 mG (Tums) Chewable 1 Tablet(s) Chew every 6 hours PRN Dyspepsia  hydrALAZINE Injectable 10 milliGRAM(s) IV Push every 6 hours PRN SBP >160  melatonin 3 milliGRAM(s) Oral at bedtime PRN Insomnia  ondansetron Injectable 4 milliGRAM(s) IV Push every 8 hours PRN Nausea and/or Vomiting  polyethylene glycol 3350 17 Gram(s) Oral daily PRN Constipation

## 2023-05-06 NOTE — DIETITIAN INITIAL EVALUATION ADULT - PERTINENT LABORATORY DATA
05-06    140  |  103  |  24.0<H>  ----------------------------<  135<H>  3.9   |  24.0  |  0.84    Ca    9.3      06 May 2023 04:51  Phos  3.7     05-06  Mg     1.9     05-06    TPro  6.7  /  Alb  3.5  /  TBili  5.0<H>  /  DBili  x   /  AST  57<H>  /  ALT  165<H>  /  AlkPhos  304<H>  05-06

## 2023-05-06 NOTE — DIETITIAN INITIAL EVALUATION ADULT - ORAL NUTRITION SUPPLEMENTS
Discontinue Ensure Plus High Protein as pt dislikes and not drinking; pt willing to trial Healthshake TID.

## 2023-05-06 NOTE — PROGRESS NOTE ADULT - ATTENDING COMMENTS
A: 68F with duodenal mass, admitted with obstructive jaundice s/p ERCP/EUS    Plan:   - on FLD, patient agreeable to hold off on regular food given concern for intolerance.  -IVF  -OOB  -Will continue to trend T bili  -Remainder of plan pending clinical course    _________    Seen w team  F/u path  Trend LFTs

## 2023-05-06 NOTE — DIETITIAN NUTRITION RISK NOTIFICATION - ADDITIONAL COMMENTS/DIETITIAN RECOMMENDATIONS
Continue diet as tolerated.   Discontinue Ensure Plus High Protein as pt dislikes and not drinking; pt willing to trial Healthshake TID.  Advance diet as medically feasible/tolerable. Encourage po intake, monitor diet tolerance, and provide assistance at meals as needed. Obtain daily weights to monitor trends.

## 2023-05-06 NOTE — DIETITIAN INITIAL EVALUATION ADULT - ADD RECOMMEND
Advance diet as medically feasible/tolerable. Encourage po intake, monitor diet tolerance, and provide assistance at meals as needed. Obtain daily weights to monitor trends.

## 2023-05-06 NOTE — DIETITIAN INITIAL EVALUATION ADULT - NS FNS DIET ORDER
Diet, Full Liquid:   Supplement Feeding Modality:  Oral  Ensure Plus High Protein Cans or Servings Per Day:  1       Frequency:  Three Times a day (05-05-23 @ 13:26)

## 2023-05-06 NOTE — DIETITIAN INITIAL EVALUATION ADULT - ETIOLOGY
related to inability to meet sufficient protein-energy in setting of duodenal mass, cholangiocarcinoma vs ampullary cancer, poor appetite

## 2023-05-06 NOTE — PROGRESS NOTE ADULT - SUBJECTIVE AND OBJECTIVE BOX
INTERVAL HPI/OVERNIGHT EVENTS:    Patient evaluated at bedside. No acute distress. No acute events overnight. Patient states she would like some thicker food. Drain output has been 1575. Will be repleated 1:0.5 every shift. S/p external-internal drain. Bili downtrending.     MEDICATIONS  (STANDING):  enoxaparin Injectable 40 milliGRAM(s) SubCutaneous every 24 hours  losartan 100 milliGRAM(s) Oral daily  pantoprazole  Injectable 40 milliGRAM(s) IV Push every 24 hours  senna 2 Tablet(s) Oral at bedtime    MEDICATIONS  (PRN):  acetaminophen     Tablet .. 650 milliGRAM(s) Oral every 6 hours PRN Temp greater or equal to 38C (100.4F), Mild Pain (1 - 3)  ALPRAZolam 0.25 milliGRAM(s) Oral every 12 hours PRN Anxiety  aluminum hydroxide/magnesium hydroxide/simethicone Suspension 30 milliLiter(s) Oral every 4 hours PRN Dyspepsia  calcium carbonate    500 mG (Tums) Chewable 1 Tablet(s) Chew every 6 hours PRN Dyspepsia  hydrALAZINE Injectable 10 milliGRAM(s) IV Push every 6 hours PRN SBP >160  melatonin 3 milliGRAM(s) Oral at bedtime PRN Insomnia  ondansetron Injectable 4 milliGRAM(s) IV Push every 8 hours PRN Nausea and/or Vomiting  polyethylene glycol 3350 17 Gram(s) Oral daily PRN Constipation      Vital Signs Last 24 Hrs  T(C): 37 (06 May 2023 04:25), Max: 37 (06 May 2023 04:25)  T(F): 98.6 (06 May 2023 04:25), Max: 98.6 (06 May 2023 04:25)  HR: 98 (06 May 2023 04:25) (84 - 98)  BP: 178/82 (06 May 2023 04:25) (149/76 - 185/82)  BP(mean): --  RR: 17 (06 May 2023 04:25) (17 - 19)  SpO2: 95% (06 May 2023 04:25) (94% - 95%)    Parameters below as of 06 May 2023 04:25  Patient On (Oxygen Delivery Method): room air        Physical Exam:    Constitutional: NAD  HEENT: PERRL, EOMI  Neck: No JVD, FROM without pain  Respiratory: Respirations non-labored, no accessory muscle use  Gastrointestinal: Soft, non-tender, non-distended  Extremities: No peripheral edema, No cyanosis  Neurological: A&O x 3; without gross deficit  Musculoskeletal: No joint pain, swelling, deformity, or point tenderness; no limitation of movement      I&O's Detail    05 May 2023 07:01  -  06 May 2023 07:00  --------------------------------------------------------  IN:  Total IN: 0 mL    OUT:    Drain (mL): 1575 mL  Total OUT: 1575 mL    Total NET: -1575 mL          LABS:                        10.7   12.59 )-----------( 432      ( 06 May 2023 04:51 )             32.2     05-06    140  |  103  |  24.0<H>  ----------------------------<  135<H>  3.9   |  24.0  |  0.84    Ca    9.3      06 May 2023 04:51  Phos  3.7     05-06  Mg     1.9     05-06    TPro  6.7  /  Alb  3.5  /  TBili  5.0<H>  /  DBili  x   /  AST  57<H>  /  ALT  165<H>  /  AlkPhos  304<H>  05-06    PT/INR - ( 06 May 2023 04:51 )   PT: 13.9 sec;   INR: 1.20 ratio               RADIOLOGY & ADDITIONAL STUDIES:

## 2023-05-06 NOTE — DIETITIAN INITIAL EVALUATION ADULT - OTHER INFO
What Type Of Note Output Would You Prefer (Optional)?: Standard Output Hpi Title: Evaluation of a Skin Lesion How Severe Are Your Spot(S)?: mild 68F with PMHX HTN, Tobacco Abuse presents to [place] ER c/o painless jaundice for past 10 days found to have painless obstructive jaundice with hyperbilirubinemia and transaminitis. S/p EGD/EUS showing abdominal lymphadenopathy, duodenal mass w/ FNA. MRCP on 5/4 findings strongly suspicious for primary cholangiocarcinoma or ampullary cancer.    Nutrition assessment completed. Pt reports very poor appetite/po intake ~2 weeks prior to admission- states she was experiencing diarrhea and unable to keep any food down. States she lost ~10 lbs during this time. Pt currently on full liquids, states appetite/po intake remains poor and is consuming small amounts. Dislikes Ensure supplements; receptive to trial Healthshake instead. Pt reports her daughter also brought in a protein drink for her which she will try. RD to follow up.

## 2023-05-07 LAB
ALBUMIN SERPL ELPH-MCNC: 3 G/DL — LOW (ref 3.3–5.2)
ALP SERPL-CCNC: 231 U/L — HIGH (ref 40–120)
ALT FLD-CCNC: 112 U/L — HIGH
ANION GAP SERPL CALC-SCNC: 14 MMOL/L — SIGNIFICANT CHANGE UP (ref 5–17)
AST SERPL-CCNC: 41 U/L — HIGH
BASOPHILS # BLD AUTO: 0.03 K/UL — SIGNIFICANT CHANGE UP (ref 0–0.2)
BASOPHILS NFR BLD AUTO: 0.3 % — SIGNIFICANT CHANGE UP (ref 0–2)
BILIRUB DIRECT SERPL-MCNC: 2.4 MG/DL — HIGH (ref 0–0.3)
BILIRUB INDIRECT FLD-MCNC: 1.3 MG/DL — HIGH (ref 0.2–1)
BILIRUB SERPL-MCNC: 3.7 MG/DL — HIGH (ref 0.4–2)
BUN SERPL-MCNC: 17.9 MG/DL — SIGNIFICANT CHANGE UP (ref 8–20)
CALCIUM SERPL-MCNC: 8.6 MG/DL — SIGNIFICANT CHANGE UP (ref 8.4–10.5)
CHLORIDE SERPL-SCNC: 101 MMOL/L — SIGNIFICANT CHANGE UP (ref 96–108)
CO2 SERPL-SCNC: 23 MMOL/L — SIGNIFICANT CHANGE UP (ref 22–29)
CREAT SERPL-MCNC: 0.78 MG/DL — SIGNIFICANT CHANGE UP (ref 0.5–1.3)
EGFR: 82 ML/MIN/1.73M2 — SIGNIFICANT CHANGE UP
EOSINOPHIL # BLD AUTO: 0.15 K/UL — SIGNIFICANT CHANGE UP (ref 0–0.5)
EOSINOPHIL NFR BLD AUTO: 1.5 % — SIGNIFICANT CHANGE UP (ref 0–6)
GLUCOSE SERPL-MCNC: 123 MG/DL — HIGH (ref 70–99)
HCT VFR BLD CALC: 28.4 % — LOW (ref 34.5–45)
HGB BLD-MCNC: 9.5 G/DL — LOW (ref 11.5–15.5)
IMM GRANULOCYTES NFR BLD AUTO: 0.7 % — SIGNIFICANT CHANGE UP (ref 0–0.9)
LYMPHOCYTES # BLD AUTO: 1.72 K/UL — SIGNIFICANT CHANGE UP (ref 1–3.3)
LYMPHOCYTES # BLD AUTO: 16.7 % — SIGNIFICANT CHANGE UP (ref 13–44)
MAGNESIUM SERPL-MCNC: 1.9 MG/DL — SIGNIFICANT CHANGE UP (ref 1.6–2.6)
MCHC RBC-ENTMCNC: 27.5 PG — SIGNIFICANT CHANGE UP (ref 27–34)
MCHC RBC-ENTMCNC: 33.5 GM/DL — SIGNIFICANT CHANGE UP (ref 32–36)
MCV RBC AUTO: 82.3 FL — SIGNIFICANT CHANGE UP (ref 80–100)
MONOCYTES # BLD AUTO: 0.63 K/UL — SIGNIFICANT CHANGE UP (ref 0–0.9)
MONOCYTES NFR BLD AUTO: 6.1 % — SIGNIFICANT CHANGE UP (ref 2–14)
NEUTROPHILS # BLD AUTO: 7.73 K/UL — HIGH (ref 1.8–7.4)
NEUTROPHILS NFR BLD AUTO: 74.7 % — SIGNIFICANT CHANGE UP (ref 43–77)
PHOSPHATE SERPL-MCNC: 3.7 MG/DL — SIGNIFICANT CHANGE UP (ref 2.4–4.7)
PLATELET # BLD AUTO: 356 K/UL — SIGNIFICANT CHANGE UP (ref 150–400)
POTASSIUM SERPL-MCNC: 3.8 MMOL/L — SIGNIFICANT CHANGE UP (ref 3.5–5.3)
POTASSIUM SERPL-SCNC: 3.8 MMOL/L — SIGNIFICANT CHANGE UP (ref 3.5–5.3)
PROT SERPL-MCNC: 5.8 G/DL — LOW (ref 6.6–8.7)
RBC # BLD: 3.45 M/UL — LOW (ref 3.8–5.2)
RBC # FLD: 14.7 % — HIGH (ref 10.3–14.5)
SODIUM SERPL-SCNC: 138 MMOL/L — SIGNIFICANT CHANGE UP (ref 135–145)
WBC # BLD: 10.33 K/UL — SIGNIFICANT CHANGE UP (ref 3.8–10.5)
WBC # FLD AUTO: 10.33 K/UL — SIGNIFICANT CHANGE UP (ref 3.8–10.5)

## 2023-05-07 PROCEDURE — 99232 SBSQ HOSP IP/OBS MODERATE 35: CPT

## 2023-05-07 RX ORDER — POTASSIUM CHLORIDE 20 MEQ
20 PACKET (EA) ORAL ONCE
Refills: 0 | Status: COMPLETED | OUTPATIENT
Start: 2023-05-07 | End: 2023-05-07

## 2023-05-07 RX ORDER — SODIUM CHLORIDE 9 MG/ML
400 INJECTION, SOLUTION INTRAVENOUS ONCE
Refills: 0 | Status: COMPLETED | OUTPATIENT
Start: 2023-05-07 | End: 2023-05-07

## 2023-05-07 RX ADMIN — POLYETHYLENE GLYCOL 3350 17 GRAM(S): 17 POWDER, FOR SOLUTION ORAL at 09:50

## 2023-05-07 RX ADMIN — Medication 10 MILLIGRAM(S): at 13:01

## 2023-05-07 RX ADMIN — ENOXAPARIN SODIUM 40 MILLIGRAM(S): 100 INJECTION SUBCUTANEOUS at 17:43

## 2023-05-07 RX ADMIN — Medication 20 MILLIEQUIVALENT(S): at 10:26

## 2023-05-07 RX ADMIN — SODIUM CHLORIDE 400 MILLILITER(S): 9 INJECTION, SOLUTION INTRAVENOUS at 13:01

## 2023-05-07 RX ADMIN — LOSARTAN POTASSIUM 100 MILLIGRAM(S): 100 TABLET, FILM COATED ORAL at 05:20

## 2023-05-07 RX ADMIN — PANTOPRAZOLE SODIUM 40 MILLIGRAM(S): 20 TABLET, DELAYED RELEASE ORAL at 10:26

## 2023-05-07 NOTE — PROGRESS NOTE ADULT - SUBJECTIVE AND OBJECTIVE BOX
Subjective: Pt resting comfortably. Tolerating FLD, denies pain. Biliary drain functioning appropriately.       MEDICATIONS  (STANDING):  enoxaparin Injectable 40 milliGRAM(s) SubCutaneous every 24 hours  losartan 100 milliGRAM(s) Oral daily  pantoprazole  Injectable 40 milliGRAM(s) IV Push every 24 hours  senna 2 Tablet(s) Oral at bedtime    MEDICATIONS  (PRN):  acetaminophen     Tablet .. 650 milliGRAM(s) Oral every 6 hours PRN Temp greater or equal to 38C (100.4F), Mild Pain (1 - 3)  ALPRAZolam 0.25 milliGRAM(s) Oral every 12 hours PRN Anxiety  aluminum hydroxide/magnesium hydroxide/simethicone Suspension 30 milliLiter(s) Oral every 4 hours PRN Dyspepsia  calcium carbonate    500 mG (Tums) Chewable 1 Tablet(s) Chew every 6 hours PRN Dyspepsia  hydrALAZINE Injectable 10 milliGRAM(s) IV Push every 6 hours PRN SBP >160  melatonin 3 milliGRAM(s) Oral at bedtime PRN Insomnia  ondansetron Injectable 4 milliGRAM(s) IV Push every 8 hours PRN Nausea and/or Vomiting  polyethylene glycol 3350 17 Gram(s) Oral daily PRN Constipation      Vital Signs Last 24 Hrs  T(C): 37 (07 May 2023 10:58), Max: 37 (07 May 2023 10:58)  T(F): 98.6 (07 May 2023 10:58), Max: 98.6 (07 May 2023 10:58)  HR: 86 (07 May 2023 10:58) (86 - 95)  BP: 154/69 (07 May 2023 10:58) (144/68 - 178/79)  BP(mean): --  RR: 18 (07 May 2023 10:58) (18 - 18)  SpO2: 95% (07 May 2023 10:58) (93% - 97%)    Parameters below as of 07 May 2023 10:58  Patient On (Oxygen Delivery Method): room air        Physical Exam:    Constitutional: NAD  HEENT: PERRL, EOMI  Neck: No JVD, FROM without pain  Respiratory: Respirations non-labored, no accessory muscle use  Gastrointestinal: Soft, non-tender, non-distended  Extremities: No peripheral edema, No cyanosis  Neurological: A&O x 3; without gross deficit  Musculoskeletal: No joint pain, swelling, deformity, or point tenderness; no limitation of movement      LABS:                        9.5    10.33 )-----------( 356      ( 07 May 2023 04:37 )             28.4     05-07    138  |  101  |  17.9  ----------------------------<  123<H>  3.8   |  23.0  |  0.78    Ca    8.6      07 May 2023 04:37  Phos  3.7     05-07  Mg     1.9     05-07    TPro  5.8<L>  /  Alb  3.0<L>  /  TBili  3.7<H>  /  DBili  2.4<H>  /  AST  41<H>  /  ALT  112<H>  /  AlkPhos  231<H>  05-07    PT/INR - ( 06 May 2023 04:51 )   PT: 13.9 sec;   INR: 1.20 ratio      A: 68F with duodenal mass, admitted with obstructive jaundice s/p ERCP/EUS    Plan:   - will cap biliary drain today  - on FLD, patient agreeable to hold off on regular food given concern for intolerance.  -IVF  -OOB  -Will continue to trend T bili  -Remainder of plan pending clinical course

## 2023-05-07 NOTE — PROGRESS NOTE ADULT - ATTENDING COMMENTS
A: 68F with duodenal mass, admitted with obstructive jaundice s/p ERCP/EUS    Plan:   - will cap biliary drain today  - on FLD, patient agreeable to hold off on regular food given concern for intolerance.  -IVF  -OOB  -Will continue to trend T bili  -Remainder of plan pending clinical course    _________________________    Seen w team  Agree w plan  Capping trial

## 2023-05-08 LAB
ALBUMIN SERPL ELPH-MCNC: 3.3 G/DL — SIGNIFICANT CHANGE UP (ref 3.3–5.2)
ALP SERPL-CCNC: 214 U/L — HIGH (ref 40–120)
ALP SERPL-CCNC: 214 U/L — HIGH (ref 40–120)
ALT FLD-CCNC: 125 U/L — HIGH
ALT FLD-CCNC: 127 U/L — HIGH
ANION GAP SERPL CALC-SCNC: 12 MMOL/L — SIGNIFICANT CHANGE UP (ref 5–17)
AST SERPL-CCNC: 62 U/L — HIGH
AST SERPL-CCNC: 62 U/L — HIGH
BASOPHILS # BLD AUTO: 0.05 K/UL — SIGNIFICANT CHANGE UP (ref 0–0.2)
BASOPHILS NFR BLD AUTO: 0.5 % — SIGNIFICANT CHANGE UP (ref 0–2)
BILIRUB SERPL-MCNC: 3.1 MG/DL — HIGH (ref 0.4–2)
BUN SERPL-MCNC: 15.3 MG/DL — SIGNIFICANT CHANGE UP (ref 8–20)
CALCIUM SERPL-MCNC: 8.9 MG/DL — SIGNIFICANT CHANGE UP (ref 8.4–10.5)
CHLORIDE SERPL-SCNC: 103 MMOL/L — SIGNIFICANT CHANGE UP (ref 96–108)
CO2 SERPL-SCNC: 25 MMOL/L — SIGNIFICANT CHANGE UP (ref 22–29)
CREAT SERPL-MCNC: 0.85 MG/DL — SIGNIFICANT CHANGE UP (ref 0.5–1.3)
EGFR: 74 ML/MIN/1.73M2 — SIGNIFICANT CHANGE UP
EOSINOPHIL # BLD AUTO: 0.31 K/UL — SIGNIFICANT CHANGE UP (ref 0–0.5)
EOSINOPHIL NFR BLD AUTO: 3.2 % — SIGNIFICANT CHANGE UP (ref 0–6)
GLUCOSE SERPL-MCNC: 135 MG/DL — HIGH (ref 70–99)
HCT VFR BLD CALC: 29.8 % — LOW (ref 34.5–45)
HGB BLD-MCNC: 9.8 G/DL — LOW (ref 11.5–15.5)
IMM GRANULOCYTES NFR BLD AUTO: 0.6 % — SIGNIFICANT CHANGE UP (ref 0–0.9)
LYMPHOCYTES # BLD AUTO: 1.95 K/UL — SIGNIFICANT CHANGE UP (ref 1–3.3)
LYMPHOCYTES # BLD AUTO: 20.4 % — SIGNIFICANT CHANGE UP (ref 13–44)
MAGNESIUM SERPL-MCNC: 2 MG/DL — SIGNIFICANT CHANGE UP (ref 1.8–2.6)
MCHC RBC-ENTMCNC: 27.4 PG — SIGNIFICANT CHANGE UP (ref 27–34)
MCHC RBC-ENTMCNC: 32.9 GM/DL — SIGNIFICANT CHANGE UP (ref 32–36)
MCV RBC AUTO: 83.2 FL — SIGNIFICANT CHANGE UP (ref 80–100)
MONOCYTES # BLD AUTO: 0.67 K/UL — SIGNIFICANT CHANGE UP (ref 0–0.9)
MONOCYTES NFR BLD AUTO: 7 % — SIGNIFICANT CHANGE UP (ref 2–14)
NEUTROPHILS # BLD AUTO: 6.51 K/UL — SIGNIFICANT CHANGE UP (ref 1.8–7.4)
NEUTROPHILS NFR BLD AUTO: 68.3 % — SIGNIFICANT CHANGE UP (ref 43–77)
PHOSPHATE SERPL-MCNC: 3.9 MG/DL — SIGNIFICANT CHANGE UP (ref 2.4–4.7)
PLATELET # BLD AUTO: 360 K/UL — SIGNIFICANT CHANGE UP (ref 150–400)
POTASSIUM SERPL-MCNC: 4.2 MMOL/L — SIGNIFICANT CHANGE UP (ref 3.5–5.3)
POTASSIUM SERPL-SCNC: 4.2 MMOL/L — SIGNIFICANT CHANGE UP (ref 3.5–5.3)
PROT SERPL-MCNC: 6.3 G/DL — LOW (ref 6.6–8.7)
RBC # BLD: 3.58 M/UL — LOW (ref 3.8–5.2)
RBC # FLD: 14.7 % — HIGH (ref 10.3–14.5)
SODIUM SERPL-SCNC: 140 MMOL/L — SIGNIFICANT CHANGE UP (ref 135–145)
WBC # BLD: 9.55 K/UL — SIGNIFICANT CHANGE UP (ref 3.8–10.5)
WBC # FLD AUTO: 9.55 K/UL — SIGNIFICANT CHANGE UP (ref 3.8–10.5)

## 2023-05-08 PROCEDURE — 99231 SBSQ HOSP IP/OBS SF/LOW 25: CPT | Mod: 1L

## 2023-05-08 PROCEDURE — 99232 SBSQ HOSP IP/OBS MODERATE 35: CPT

## 2023-05-08 RX ADMIN — PANTOPRAZOLE SODIUM 40 MILLIGRAM(S): 20 TABLET, DELAYED RELEASE ORAL at 11:24

## 2023-05-08 RX ADMIN — ENOXAPARIN SODIUM 40 MILLIGRAM(S): 100 INJECTION SUBCUTANEOUS at 17:52

## 2023-05-08 RX ADMIN — LOSARTAN POTASSIUM 100 MILLIGRAM(S): 100 TABLET, FILM COATED ORAL at 05:29

## 2023-05-08 NOTE — PROGRESS NOTE ADULT - NS ATTEND AMEND GEN_ALL_CORE FT
Patient seen and examined. s/p PTBD and capping of the drain. Patient is denying any complaints. O/E: Abdomen is soft. PTBD in place. MARIANN: Unremarkable.    1. Obstructive jaundice/duodenal mass/celiac lymph nodes: S/p EGD/EUS with FNA. ERCP could not be performed due to obstructing duodenal mass. LFts improving  2. Gastric outlet obstruction: On liquid diet. Discussed with surgical oncology. Surgical management now vs later on needs to determined.   3. Protein energy malnutrition: Needs to have assessment from dietician. Need for further supplements vs parenteral nutrition to be decided. Calorie intake needs to be evaluated  4. Will follow up
I evaluated and examined this pt. with my ACP. See above assessment and management plan recommendations.

## 2023-05-08 NOTE — PROGRESS NOTE ADULT - ASSESSMENT
A: 68F with duodenal mass, admitted with obstructive jaundice s/p ERCP/EUS    Plan:   - on FLD, patient agreeable to hold off on regular food given concern for intolerance.  -IVF  -OOB  -Will continue to trend T bili  -Remainder of plan pending clinical course
68F with PMHX HTN, Tobacco Abuse presents to [place] ER c/o painless jaundice for past 10 days found to have painless obstructive jaundice with hyperbilirubinemia and transaminitis transferred to [facility] for advanced GI evaluation.
68F with PMHX HTN, Tobacco Abuse presents to [place] ER c/o painless jaundice for past 10 days found to have painless obstructive jaundice with hyperbilirubinemia and transaminitis transferred to [facility] for advanced GI evaluation.    Painless obstructive Jaundice   S/p EGD/EUS yesterday showing Abdominal lymphadenopathy, Duodenal mass w/ FNA.   MRCP on 5/4 findings strongly suspicious for primary cholangiocarcinoma or ampullary cancer. No evidence of metastatic disease to the liver.  Today scheduled for possible internal/external drain placement with IR.   Hemoglobin stable, LFTs trending down. CA 19-9 elevated at 881.  Continue to trend CBC, LFTs   Rest of care as per surgical team 
68F with PMHX HTN, Tobacco Abuse presents to [place] ER c/o painless jaundice for past 10 days found to have painless obstructive jaundice with hyperbilirubinemia and transaminitis transferred to [facility] for advanced GI evaluation.    PLAN:  Painless Obstructive Jaundice with Hyperbilirubinemia and Transaminitis r/o Cholangiocarcinoma v Ampulla CA  -Repeat Labs  -Trend LFTs  -Trend Lipase  -IVF LR 75cc/hr  -CT and ABD US imaging reviewed in HIE  -NPO at midnight  -GI Consulted - EUS/ERCP tomorrow    HTN  -Restart Losartan, Hold HCTZ, restart as tolerated  -Monitor BP  -Hydralazine 10mg IV q6 PRN SBP >160    Tobacco Abuse  - Cessation    Healthcare Maintenance  DVT PPX - SCDs  Dispo - Pending Procedure
68F with PMHX HTN, Tobacco Abuse presents to [place] ER c/o painless jaundice for past 10 days found to have painless obstructive jaundice with hyperbilirubinemia and transaminitis transferred to [facility] for advanced GI evaluation.    PLAN:    #Painless Obstructive Jaundice with Hyperbilirubinemia and Transaminitis r/o Cholangiocarcinoma v Ampulla CA  CA-19-9 elevated, CEA wnl  -Trend LFTs  -f/u MRCP read  -IVF LR 75cc/hr  -CT and ABD US imaging reviewed in HIE  -GI Consulted - EUS/ERCP today, likely DC after    #HTN  -c/w Losartan, Hold HCTZ, restart as tolerated  -Monitor BP  -Hydralazine 10mg IV q6 PRN SBP >160    #Tobacco Abuse  - Cessation    #Healthcare Maintenance  DVT PPX - SCDs  Dispo - DC home pending ERCP/EUS

## 2023-05-08 NOTE — PROGRESS NOTE ADULT - PROBLEM SELECTOR PLAN 1
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  S/p EGD/EUS showing Abdominal lymphadenopathy, Duodenal mass w/ FNA.   MRCP on 5/4 findings strongly suspicious for primary cholangiocarcinoma or ampullary cancer. No evidence of metastatic disease to the liver.  S/p PTC drain placed by IR, now clamped   Hemoglobin stable, LFTs stable. Tbili slight trend down. CA 19-9 elevated at 881.  Continue to trend CBC, LFTs   Rest of care as per surgical- Oncology team Secondary to either primary cholangiocarcinoma or ampullary malignancy with VONDA and obstructing duodenal mass.  S/p EGD/EUS showing Abdominal lymphadenopathy, Duodenal mass w/ FNA.   MRCP on 5/4 findings strongly suspicious for primary cholangiocarcinoma or ampullary cancer. No evidence of metastatic disease to the liver.  S/p PTC drain placed by IR, now clamped   Hemoglobin stable, LFTs stable. Tbili slight trend down. CA 19-9 elevated at 881.  Continue to trend CBC, LFTs   Consider nutritional Consultation   Rest of care as per surgical- Oncology team

## 2023-05-08 NOTE — PROGRESS NOTE ADULT - SUBJECTIVE AND OBJECTIVE BOX
Chief Complaint:  Patient is a 68y old  Female who presents with a chief complaint of Painless Jaundice (08 May 2023 07:07)      HPI/ 24 hr events: Patient seen and examined at bedside, no overnight events. Pt feeling well this morning.  S/p EGD/EUS showing Abdominal lymphadenopathy, Duodenal mass w/ FNA. Biliary drain in place, now clamped. Tolerating diet. Vitals are stable, no leukocytosis, Hemoglobin stable, LFTs stable, Tbili downtrending at 3.1 this AM. Denies nausea, vomiting, abdominal pain.      REVIEW OF SYSTEMS:   General: Negative  HEENT: Negative  CV: Negative  Respiratory: Negative  GI: See HPI  : Negative  MSK: Negative  Hematologic: Negative  Skin: Negative      MEDICATIONS:   MEDICATIONS  (STANDING):  enoxaparin Injectable 40 milliGRAM(s) SubCutaneous every 24 hours  losartan 100 milliGRAM(s) Oral daily  pantoprazole  Injectable 40 milliGRAM(s) IV Push every 24 hours  senna 2 Tablet(s) Oral at bedtime    MEDICATIONS  (PRN):  acetaminophen     Tablet .. 650 milliGRAM(s) Oral every 6 hours PRN Temp greater or equal to 38C (100.4F), Mild Pain (1 - 3)  ALPRAZolam 0.25 milliGRAM(s) Oral every 12 hours PRN Anxiety  aluminum hydroxide/magnesium hydroxide/simethicone Suspension 30 milliLiter(s) Oral every 4 hours PRN Dyspepsia  calcium carbonate    500 mG (Tums) Chewable 1 Tablet(s) Chew every 6 hours PRN Dyspepsia  hydrALAZINE Injectable 10 milliGRAM(s) IV Push every 6 hours PRN SBP >160  melatonin 3 milliGRAM(s) Oral at bedtime PRN Insomnia  ondansetron Injectable 4 milliGRAM(s) IV Push every 8 hours PRN Nausea and/or Vomiting  polyethylene glycol 3350 17 Gram(s) Oral daily PRN Constipation      ALLERGIES:   Allergies    No Known Allergies    Intolerances        VITAL SIGNS:   Vital Signs Last 24 Hrs  T(C): 36.7 (08 May 2023 08:36), Max: 37.1 (08 May 2023 05:03)  T(F): 98 (08 May 2023 08:36), Max: 98.7 (08 May 2023 05:03)  HR: 78 (08 May 2023 08:36) (78 - 89)  BP: 150/71 (08 May 2023 08:36) (150/71 - 167/70)  BP(mean): --  RR: 18 (08 May 2023 08:36) (18 - 18)  SpO2: 94% (08 May 2023 08:36) (93% - 95%)    Parameters below as of 08 May 2023 08:36  Patient On (Oxygen Delivery Method): room air      I&O's Summary    07 May 2023 07:01  -  08 May 2023 07:00  --------------------------------------------------------  IN: 0 mL / OUT: 400 mL / NET: -400 mL        PHYSICAL EXAM:   GENERAL:  No acute distress  HEENT:  NC/AT, conjunctiva clear, sclera icteric  CHEST:  No increased effort, breath sounds clear  HEART:  Regular rhythm, S1, S2,   ABDOMEN: Biliary drain in place, Soft, non-tender, non-distended, normoactive bowel sounds  EXTREMITIES: No edema  SKIN: Jaundice, Warm, dry  NEURO:  Calm, cooperative      LABS:  CBC Full  -  ( 08 May 2023 06:30 )  WBC Count : 9.55 K/uL  RBC Count : 3.58 M/uL  Hemoglobin : 9.8 g/dL  Hematocrit : 29.8 %  Platelet Count - Automated : 360 K/uL  Mean Cell Volume : 83.2 fl  Mean Cell Hemoglobin : 27.4 pg  Mean Cell Hemoglobin Concentration : 32.9 gm/dL  Auto Neutrophil # : 6.51 K/uL  Auto Lymphocyte # : 1.95 K/uL  Auto Monocyte # : 0.67 K/uL  Auto Eosinophil # : 0.31 K/uL  Auto Basophil # : 0.05 K/uL  Auto Neutrophil % : 68.3 %  Auto Lymphocyte % : 20.4 %  Auto Monocyte % : 7.0 %  Auto Eosinophil % : 3.2 %  Auto Basophil % : 0.5 %    05-08    140  |  103  |  15.3  ----------------------------<  135<H>  4.2   |  25.0  |  0.85    Ca    8.9      08 May 2023 06:47  Phos  3.9     05-08  Mg     2.0     05-08    TPro  6.3<L>  /  Alb  3.3  /  TBili  3.1<H>  /  DBili  x   /  AST  62<H>  /  ALT  127<H>  /  AlkPhos  214<H>  05-08    LIVER FUNCTIONS - ( 08 May 2023 06:47 )  Alb: 3.3 g/dL / Pro: 6.3 g/dL / ALK PHOS: 214 U/L / ALT: 127 U/L / AST: 62 U/L / GGT: x                   CA 19-9  881 U/mL  AFP  --  CEA  1.7 ng/mL    --  05-04-23 @ 05:31        RADIOLOGY & ADDITIONAL STUDIES:            < from: EGD w/ EUS (05.04.23 @ 00:00) >    EGD-EUS (Upper) Procedure Report    Date: 5/4/2023    Patient Name: ALLISON EPPERSON    MRN #: [id number]      Endoscopy Findings:      An upper endoscopy was performed, and the esophagus was normal. Mildly irregular    Z line was noted. Small amount of food material was noted. Erythematous mucosa    was noted in the stomach. On retroflexion, GE junction was normal. There was    fungating mass noted in duodenal bulb and sweep leading to the obstruction.    Second portion could not be reached.        Endoscopic Ultrasound Findings:        The gastroscope was then exchanged for the echoendoscope and linear    echoendoscope was used to scan the pancreatic parenchyma. The pancreatic body    and tail were initially examined from the gastric body and fundus revealing no    parenchymal abnormalities. There were multiple lymph nodes noted around the    celiac artery. FNA was performed. Olympus 22 gauge EZ shot needle, 3 passes were    obtained. The common bile duct was also traced from the hilum to the HOP, no    stones were noted. The HOP could not be assessed. The scope was then removed.        Impressions:    Abdominal lymphadenopathy.    Duodenal mass.        Plan:    Await pathology results.    Return to floor for further management    Refer to Interventional Radiology    Follow-up with surgical oncology team      Dion Martell MD        Version 1, Electronically signed on 5/4/2023 5:28:49 PM by Dion Martell MD    < end of copied text >        ACC: [id number] EXAM:  MR MRCP WAW IC   ORDERED BY: REVA AGUILERA     PROCEDURE DATE:  05/04/2023      FINDINGS:  LOWER CHEST: Within normal limits.    LIVER: Within normal limits.  BILE DUCTS: There is moderate intra and extrahepatic biliary dilatation   with abrupt cut off at the level of the distal common bile duct and   ampulla. The common bile duct measures 17 mm in diameter. There is no   evidence of filling defect in the common bile duct to suggest   choledocholithiasis. There is T2 hypointense delayed circumferential wall   thickening and enhancement of the distal common bile duct and ampulla.  GALLBLADDER: Distended. Cystic changes in the collapsed fundus compatible   with adenomyomatosis.  SPLEEN: Within normal limits.  PANCREAS: There is mild diffuse edematous fullness of the pancreas with   trace peripancreatic stranding extending into the retroperitoneum,   compatible with pancreatitis. There is mild stranding extending along the   pancreatic duodenal groove. There is subtle irregular dilatation of the   pancreatic duct measuring up to 6 mm in the neck. There is no evidence of   pancreatic necrosis.  ADRENALS: Within normal limits.  KIDNEYS/URETERS: Mild bilateral perinephric stranding. Otherwise, within   normal limits.    VISUALIZED PORTIONS:  BOWEL: Mild concentric wall thickening of the second portion of the   duodenum. No evidence of bowel obstruction.  PERITONEUM: No ascites.  VESSELS: Within normal limits.  RETROPERITONEUM/LYMPH NODES: Again are noted prominent T2 hyperintense   mildly enhancing centrally necrotic portillo hepatis, portacaval,   retroperitoneal, para-aortic, aortocaval, and bilateral iliac chain lymph   nodes.  ABDOMINAL WALL: Within normal limits.  BONES: Within normal limits.    IMPRESSION: Circumferential wall thickening and delayed enhancement of   the distal common bile duct and ampulla with intra and extrahepatic   biliary dilatation and pancreatic ductal dilatation. Mild acute   pancreatitis. Pathologically enlarged centrally necrotic portillo hepatis,   portacaval, and retroperitoneal lymph nodes. 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 No   evidence of metastatic disease to the liver.    --- End of Report ---

## 2023-05-08 NOTE — PROGRESS NOTE ADULT - SUBJECTIVE AND OBJECTIVE BOX
Subjective: Patient seen at bedside, no current complaints, states she is doing better than yesterday, no overnight events, denies n/v/cp/sob. Tolerating diet, urinating freely, external drain capped       MEDICATIONS  (STANDING):  enoxaparin Injectable 40 milliGRAM(s) SubCutaneous every 24 hours  losartan 100 milliGRAM(s) Oral daily  pantoprazole  Injectable 40 milliGRAM(s) IV Push every 24 hours  senna 2 Tablet(s) Oral at bedtime    MEDICATIONS  (PRN):  acetaminophen     Tablet .. 650 milliGRAM(s) Oral every 6 hours PRN Temp greater or equal to 38C (100.4F), Mild Pain (1 - 3)  ALPRAZolam 0.25 milliGRAM(s) Oral every 12 hours PRN Anxiety  aluminum hydroxide/magnesium hydroxide/simethicone Suspension 30 milliLiter(s) Oral every 4 hours PRN Dyspepsia  calcium carbonate    500 mG (Tums) Chewable 1 Tablet(s) Chew every 6 hours PRN Dyspepsia  hydrALAZINE Injectable 10 milliGRAM(s) IV Push every 6 hours PRN SBP >160  melatonin 3 milliGRAM(s) Oral at bedtime PRN Insomnia  ondansetron Injectable 4 milliGRAM(s) IV Push every 8 hours PRN Nausea and/or Vomiting  polyethylene glycol 3350 17 Gram(s) Oral daily PRN Constipation      Vital Signs Last 24 Hrs  T(C): 37.1 (08 May 2023 05:03), Max: 37.1 (08 May 2023 05:03)  T(F): 98.7 (08 May 2023 05:03), Max: 98.7 (08 May 2023 05:03)  HR: 85 (08 May 2023 05:03) (83 - 89)  BP: 167/70 (08 May 2023 05:03) (154/65 - 167/70)  BP(mean): --  RR: 18 (08 May 2023 05:03) (18 - 18)  SpO2: 93% (08 May 2023 05:03) (93% - 95%)    Parameters below as of 08 May 2023 05:03  Patient On (Oxygen Delivery Method): room air        Physical Exam:    Constitutional: NAD  HEENT: PERRL, EOMI  Respiratory: Respirations non-labored, no accessory muscle use  Gastrointestinal: Soft, non-tender, non-distended, drain with bilious output  Neurological: A&O x 3      LABS:                        9.8    9.55  )-----------( 360      ( 08 May 2023 06:30 )             29.8     05-07    138  |  101  |  17.9  ----------------------------<  123<H>  3.8   |  23.0  |  0.78    Ca    8.6      07 May 2023 04:37  Phos  3.7     05-07  Mg     1.9     05-07    TPro  5.8<L>  /  Alb  3.0<L>  /  TBili  3.7<H>  /  DBili  2.4<H>  /  AST  41<H>  /  ALT  112<H>  /  AlkPhos  231<H>  05-07          A: 68F with duodenal mass, admitted with obstructive jaundice s/p ERCP/EUS and internal/external drain placement    Plan:   - on FLD, patient agreeable to hold off on regular food given concern for intolerance.  - IVF  - OOB  - Will continue to trend T bili  - Pain control  - AM labs

## 2023-05-08 NOTE — PROGRESS NOTE ADULT - NS ATTEST RISK PROBLEM GEN_ALL_CORE FT
Pending GI procedure
Pending Invasive GI Procedure
Multiple issues, gastric outlet obstruction, obstructive jaundice, malnutrition and comanagement with surgical oncology.
Review of EUS and CT and MRI reports as well as today's lab work.

## 2023-05-09 ENCOUNTER — TRANSCRIPTION ENCOUNTER (OUTPATIENT)
Age: 68
End: 2023-05-09

## 2023-05-09 VITALS
RESPIRATION RATE: 18 BRPM | DIASTOLIC BLOOD PRESSURE: 75 MMHG | TEMPERATURE: 98 F | SYSTOLIC BLOOD PRESSURE: 157 MMHG | OXYGEN SATURATION: 96 % | HEART RATE: 78 BPM

## 2023-05-09 PROBLEM — I10 ESSENTIAL (PRIMARY) HYPERTENSION: Chronic | Status: ACTIVE | Noted: 2023-05-02

## 2023-05-09 LAB
ALBUMIN SERPL ELPH-MCNC: 3.2 G/DL — LOW (ref 3.3–5.2)
ALP SERPL-CCNC: 201 U/L — HIGH (ref 40–120)
ALT FLD-CCNC: 124 U/L — HIGH
ANION GAP SERPL CALC-SCNC: 15 MMOL/L — SIGNIFICANT CHANGE UP (ref 5–17)
AST SERPL-CCNC: 55 U/L — HIGH
BILIRUB SERPL-MCNC: 2.8 MG/DL — HIGH (ref 0.4–2)
BUN SERPL-MCNC: 14.7 MG/DL — SIGNIFICANT CHANGE UP (ref 8–20)
CALCIUM SERPL-MCNC: 8.6 MG/DL — SIGNIFICANT CHANGE UP (ref 8.4–10.5)
CHLORIDE SERPL-SCNC: 104 MMOL/L — SIGNIFICANT CHANGE UP (ref 96–108)
CO2 SERPL-SCNC: 22 MMOL/L — SIGNIFICANT CHANGE UP (ref 22–29)
CREAT SERPL-MCNC: 0.77 MG/DL — SIGNIFICANT CHANGE UP (ref 0.5–1.3)
EGFR: 83 ML/MIN/1.73M2 — SIGNIFICANT CHANGE UP
GLUCOSE SERPL-MCNC: 142 MG/DL — HIGH (ref 70–99)
POTASSIUM SERPL-MCNC: 4.3 MMOL/L — SIGNIFICANT CHANGE UP (ref 3.5–5.3)
POTASSIUM SERPL-SCNC: 4.3 MMOL/L — SIGNIFICANT CHANGE UP (ref 3.5–5.3)
PROT SERPL-MCNC: 6.1 G/DL — LOW (ref 6.6–8.7)
SODIUM SERPL-SCNC: 141 MMOL/L — SIGNIFICANT CHANGE UP (ref 135–145)

## 2023-05-09 PROCEDURE — 86900 BLOOD TYPING SEROLOGIC ABO: CPT

## 2023-05-09 PROCEDURE — 84075 ASSAY ALKALINE PHOSPHATASE: CPT

## 2023-05-09 PROCEDURE — 80048 BASIC METABOLIC PNL TOTAL CA: CPT

## 2023-05-09 PROCEDURE — 82248 BILIRUBIN DIRECT: CPT

## 2023-05-09 PROCEDURE — 85610 PROTHROMBIN TIME: CPT

## 2023-05-09 PROCEDURE — C1729: CPT

## 2023-05-09 PROCEDURE — C1769: CPT

## 2023-05-09 PROCEDURE — 86803 HEPATITIS C AB TEST: CPT

## 2023-05-09 PROCEDURE — 80053 COMPREHEN METABOLIC PANEL: CPT

## 2023-05-09 PROCEDURE — 85025 COMPLETE CBC W/AUTO DIFF WBC: CPT

## 2023-05-09 PROCEDURE — 88342 IMHCHEM/IMCYTCHM 1ST ANTB: CPT

## 2023-05-09 PROCEDURE — 74183 MRI ABD W/O CNTR FLWD CNTR: CPT

## 2023-05-09 PROCEDURE — 88341 IMHCHEM/IMCYTCHM EA ADD ANTB: CPT

## 2023-05-09 PROCEDURE — 82378 CARCINOEMBRYONIC ANTIGEN: CPT

## 2023-05-09 PROCEDURE — 86301 IMMUNOASSAY TUMOR CA 19-9: CPT

## 2023-05-09 PROCEDURE — 84450 TRANSFERASE (AST) (SGOT): CPT

## 2023-05-09 PROCEDURE — 83690 ASSAY OF LIPASE: CPT

## 2023-05-09 PROCEDURE — 85730 THROMBOPLASTIN TIME PARTIAL: CPT

## 2023-05-09 PROCEDURE — 88305 TISSUE EXAM BY PATHOLOGIST: CPT

## 2023-05-09 PROCEDURE — 76942 ECHO GUIDE FOR BIOPSY: CPT

## 2023-05-09 PROCEDURE — C9399: CPT

## 2023-05-09 PROCEDURE — C1894: CPT

## 2023-05-09 PROCEDURE — 84460 ALANINE AMINO (ALT) (SGPT): CPT

## 2023-05-09 PROCEDURE — 80076 HEPATIC FUNCTION PANEL: CPT

## 2023-05-09 PROCEDURE — 86850 RBC ANTIBODY SCREEN: CPT

## 2023-05-09 PROCEDURE — 83735 ASSAY OF MAGNESIUM: CPT

## 2023-05-09 PROCEDURE — 84100 ASSAY OF PHOSPHORUS: CPT

## 2023-05-09 PROCEDURE — 36415 COLL VENOUS BLD VENIPUNCTURE: CPT

## 2023-05-09 PROCEDURE — 85027 COMPLETE CBC AUTOMATED: CPT

## 2023-05-09 PROCEDURE — 88173 CYTOPATH EVAL FNA REPORT: CPT

## 2023-05-09 PROCEDURE — 86901 BLOOD TYPING SEROLOGIC RH(D): CPT

## 2023-05-09 RX ADMIN — PANTOPRAZOLE SODIUM 40 MILLIGRAM(S): 20 TABLET, DELAYED RELEASE ORAL at 12:36

## 2023-05-09 RX ADMIN — LOSARTAN POTASSIUM 100 MILLIGRAM(S): 100 TABLET, FILM COATED ORAL at 06:20

## 2023-05-09 NOTE — PROGRESS NOTE ADULT - SUBJECTIVE AND OBJECTIVE BOX
Subjective: Patient seen and examined at bedside, no acute complaints, eager to go home. Tolerating ensures well.     MEDICATIONS  (STANDING):  enoxaparin Injectable 40 milliGRAM(s) SubCutaneous every 24 hours  losartan 100 milliGRAM(s) Oral daily  pantoprazole  Injectable 40 milliGRAM(s) IV Push every 24 hours  senna 2 Tablet(s) Oral at bedtime    MEDICATIONS  (PRN):  acetaminophen     Tablet .. 650 milliGRAM(s) Oral every 6 hours PRN Temp greater or equal to 38C (100.4F), Mild Pain (1 - 3)  ALPRAZolam 0.25 milliGRAM(s) Oral every 12 hours PRN Anxiety  aluminum hydroxide/magnesium hydroxide/simethicone Suspension 30 milliLiter(s) Oral every 4 hours PRN Dyspepsia  calcium carbonate    500 mG (Tums) Chewable 1 Tablet(s) Chew every 6 hours PRN Dyspepsia  hydrALAZINE Injectable 10 milliGRAM(s) IV Push every 6 hours PRN SBP >160  melatonin 3 milliGRAM(s) Oral at bedtime PRN Insomnia  ondansetron Injectable 4 milliGRAM(s) IV Push every 8 hours PRN Nausea and/or Vomiting  polyethylene glycol 3350 17 Gram(s) Oral daily PRN Constipation    Vital Signs Last 24 Hrs  T(C): 37.2 (09 May 2023 04:06), Max: 37.2 (09 May 2023 04:06)  T(F): 98.9 (09 May 2023 04:06), Max: 98.9 (09 May 2023 04:06)  HR: 81 (09 May 2023 04:06) (76 - 83)  BP: 158/66 (09 May 2023 04:06) (150/71 - 165/70)  BP(mean): --  RR: 18 (09 May 2023 04:06) (18 - 20)  SpO2: 93% (09 May 2023 04:06) (93% - 96%)  Parameters below as of 09 May 2023 04:06  Patient On (Oxygen Delivery Method): room air    Physical Exam:  Constitutional: NAD  HEENT: PERRL, EOMI  Neck: No JVD, FROM without pain  Respiratory: Respirations non-labored, no accessory muscle use  Gastrointestinal: Soft, non-tender, non-distended, drain in place, capped   Extremities: No peripheral edema, No cyanosis  Neurological: A&O x 3; without gross deficit    LABS:                     9.8    9.55  )-----------( 360      ( 08 May 2023 06:30 )             29.8   05-09  141  |  104  |  14.7  ----------------------------<  142<H>  4.3   |  22.0  |  0.77  Ca    8.6      09 May 2023 05:14  Phos  3.9     05-08  Mg     2.0     05-08  TPro  6.1<L>  /  Alb  3.2<L>  /  TBili  2.8<H>  /  DBili  x   /  AST  55<H>  /  ALT  124<H>  /  AlkPhos  201<H>  05-09    A: Patient is a 67 yo f s/p EGD, EUS 5/4, and s/p internal and external drain placement 5/5, tolerating a FLD. T bili down to 2.8 today.     Plan:   - Dc planning   - Continue FLD and ensures   - Encourage IS and ambulation oob

## 2023-05-09 NOTE — PROGRESS NOTE ADULT - NUTRITIONAL ASSESSMENT
This patient has been assessed with a concern for Malnutrition and has been determined to have a diagnosis/diagnoses of Severe protein-calorie malnutrition.    This patient is being managed with:   Diet Full Liquid-  Supplement Feeding Modality:  Oral  Ensure Enlive Cans or Servings Per Day:  9       Frequency:  Three Times a day  Ensure Plus High Protein Cans or Servings Per Day:  1       Frequency:  Three Times a day  Entered: May  7 2023  5:49AM  

## 2023-05-09 NOTE — PROGRESS NOTE ADULT - REASON FOR ADMISSION
Painless Jaundice

## 2023-05-09 NOTE — DISCHARGE NOTE NURSING/CASE MANAGEMENT/SOCIAL WORK - PATIENT PORTAL LINK FT
You can access the FollowMyHealth Patient Portal offered by [facility] by registering at the following website: [email or web address]. By joining [employer]’s FollowMyHealth portal, you will also be able to view your health information using other applications (apps) compatible with our system.

## 2023-05-09 NOTE — DISCHARGE NOTE NURSING/CASE MANAGEMENT/SOCIAL WORK - NSDCPEFALRISK_GEN_ALL_CORE
For information on Fall & Injury Prevention, visit: [email or web address] OR  [email or web address] OR  https://www.cdc.gov/steadi/patient.html

## 2023-05-09 NOTE — CHART NOTE - NSCHARTNOTEFT_GEN_A_CORE
Source: Patient [x ]  Family [x ]   other [ x]    Current Diet: Diet, Full Liquid:   Supplement Feeding Modality:  Oral  Ensure Enlive Cans or Servings Per Day:  9       Frequency:  Three Times a day  Ensure Plus High Protein Cans or Servings Per Day:  1       Frequency:  Three Times a day (05-07-23 @ 05:49)      Patient reports [ ] nausea  [ ] vomiting [ ] diarrhea [ ] constipation  [ ]chewing problems [ ] swallowing issues  [ ] other:     PO intake:  < 50% [x ]   50-75%  [ ]   %  [ ]  other :    Current Weight:   (5/4) 145.5 lbs  No new weight  No edema documented     Pertinent Medications: MEDICATIONS  (STANDING):  enoxaparin Injectable 40 milliGRAM(s) SubCutaneous every 24 hours  losartan 100 milliGRAM(s) Oral daily  pantoprazole  Injectable 40 milliGRAM(s) IV Push every 24 hours  senna 2 Tablet(s) Oral at bedtime    MEDICATIONS  (PRN):  acetaminophen     Tablet .. 650 milliGRAM(s) Oral every 6 hours PRN Temp greater or equal to 38C (100.4F), Mild Pain (1 - 3)  ALPRAZolam 0.25 milliGRAM(s) Oral every 12 hours PRN Anxiety  aluminum hydroxide/magnesium hydroxide/simethicone Suspension 30 milliLiter(s) Oral every 4 hours PRN Dyspepsia  calcium carbonate    500 mG (Tums) Chewable 1 Tablet(s) Chew every 6 hours PRN Dyspepsia  hydrALAZINE Injectable 10 milliGRAM(s) IV Push every 6 hours PRN SBP >160  melatonin 3 milliGRAM(s) Oral at bedtime PRN Insomnia  ondansetron Injectable 4 milliGRAM(s) IV Push every 8 hours PRN Nausea and/or Vomiting  polyethylene glycol 3350 17 Gram(s) Oral daily PRN Constipation    Pertinent Labs: CBC Full  -  ( 08 May 2023 06:30 )  WBC Count : 9.55 K/uL  RBC Count : 3.58 M/uL  Hemoglobin : 9.8 g/dL  Hematocrit : 29.8 %  Platelet Count - Automated : 360 K/uL  Mean Cell Volume : 83.2 fl  Mean Cell Hemoglobin : 27.4 pg  Mean Cell Hemoglobin Concentration : 32.9 gm/dL  Auto Neutrophil # : 6.51 K/uL  Auto Lymphocyte # : 1.95 K/uL  Auto Monocyte # : 0.67 K/uL  Auto Eosinophil # : 0.31 K/uL  Auto Basophil # : 0.05 K/uL  Auto Neutrophil % : 68.3 %  Auto Lymphocyte % : 20.4 %  Auto Monocyte % : 7.0 %  Auto Eosinophil % : 3.2 %  Auto Basophil % : 0.5 %      05-09 Na141 mmol/L Glu 142 mg/dL<H> K+ 4.3 mmol/L Cr  0.77 mg/dL BUN 14.7 mg/dL Phos n/a   Alb 3.2 g/dL<L> PAB n/a       Skin: Surgical incision to abdomen per documentation   Herbert: 21    Nutrition focused physical exam previously conducted - found signs of malnutrition [ ]absent [x ]present    Subcutaneous fat loss: [x ] Orbital fat pads region, [ ]Buccal fat region, [x ]Triceps region,  [ ]Ribs region    Muscle wasting: [ x]Temples region, [ x]Clavicle region, [ x]Shoulder region, [ ]Scapula region, [ ]Interosseous region,  [ ]thigh region, [ ]Calf region    Estimated Needs:   [x ] no change since previous assessment  [ ] recalculated:     Current Nutrition Diagnosis: Pt remains at high nutrition risk secondary to malnutrition (severe, acute) related to inability to meet sufficient protein-energy in setting of duodenal mass, 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 Spoke with pt and pts daughter at bedside. Tolerating full liquid diet, though consuming small amounts. States she is now drinking Ensure supplements (dislikes Healthshake) and is aware how important protein intake is to optimize nutritional status. Provided with full liquid diet nutrition therapy and emphasized the importance of consuming 3-4 Ensure supplements/day. Pt and daughter both verbalized understanding. RD to follow up.       Recommendations:   1) Continue diet as tolerated.  2) Continue Ensure Enlive TID (350 kcal, 20g protein per serving).  3) Encourage po intake, monitor diet tolerance, and provide assistance at meals as needed.  4) Obtain daily weights to monitor trends.     Monitoring and Evaluation:   [x ] PO intake [ x] Tolerance to diet prescription [X] Weights  [X] Follow up per protocol [X] Labs: chem 8, mg, phos, H/H

## 2023-05-11 LAB — SURGICAL PATHOLOGY STUDY: SIGNIFICANT CHANGE UP

## 2023-05-11 RX ORDER — PANTOPRAZOLE 40 MG/1
40 TABLET, DELAYED RELEASE ORAL
Qty: 28 | Refills: 0 | Status: ACTIVE | COMMUNITY
Start: 2023-05-11 | End: 1900-01-01

## 2023-05-12 ENCOUNTER — INPATIENT (INPATIENT)
Facility: HOSPITAL | Age: 68
LOS: 6 days | Discharge: HOME | DRG: 326 | End: 2023-05-19
Attending: SURGERY | Admitting: SURGERY
Payer: MEDICARE

## 2023-05-12 VITALS
WEIGHT: 121.92 LBS | HEART RATE: 94 BPM | HEIGHT: 60 IN | OXYGEN SATURATION: 98 % | TEMPERATURE: 98 F | SYSTOLIC BLOOD PRESSURE: 128 MMHG | DIASTOLIC BLOOD PRESSURE: 65 MMHG | RESPIRATION RATE: 20 BRPM

## 2023-05-12 DIAGNOSIS — R11.2 NAUSEA WITH VOMITING, UNSPECIFIED: ICD-10-CM

## 2023-05-12 LAB
ALBUMIN SERPL ELPH-MCNC: 4 G/DL — SIGNIFICANT CHANGE UP (ref 3.3–5.2)
ALP SERPL-CCNC: 179 U/L — HIGH (ref 40–120)
ALT FLD-CCNC: 112 U/L — HIGH
ANION GAP SERPL CALC-SCNC: 15 MMOL/L — SIGNIFICANT CHANGE UP (ref 5–17)
APTT BLD: 29.7 SEC — SIGNIFICANT CHANGE UP (ref 27.5–35.5)
AST SERPL-CCNC: 69 U/L — HIGH
BASOPHILS # BLD AUTO: 0.06 K/UL — SIGNIFICANT CHANGE UP (ref 0–0.2)
BASOPHILS NFR BLD AUTO: 0.4 % — SIGNIFICANT CHANGE UP (ref 0–2)
BILIRUB SERPL-MCNC: 2.4 MG/DL — HIGH (ref 0.4–2)
BLD GP AB SCN SERPL QL: SIGNIFICANT CHANGE UP
BUN SERPL-MCNC: 26.6 MG/DL — HIGH (ref 8–20)
CALCIUM SERPL-MCNC: 9.4 MG/DL — SIGNIFICANT CHANGE UP (ref 8.4–10.5)
CHLORIDE SERPL-SCNC: 97 MMOL/L — SIGNIFICANT CHANGE UP (ref 96–108)
CO2 SERPL-SCNC: 24 MMOL/L — SIGNIFICANT CHANGE UP (ref 22–29)
CREAT SERPL-MCNC: 0.97 MG/DL — SIGNIFICANT CHANGE UP (ref 0.5–1.3)
EGFR: 63 ML/MIN/1.73M2 — SIGNIFICANT CHANGE UP
EOSINOPHIL # BLD AUTO: 0.13 K/UL — SIGNIFICANT CHANGE UP (ref 0–0.5)
EOSINOPHIL NFR BLD AUTO: 0.9 % — SIGNIFICANT CHANGE UP (ref 0–6)
GLUCOSE SERPL-MCNC: 137 MG/DL — HIGH (ref 70–99)
HCT VFR BLD CALC: 35.9 % — SIGNIFICANT CHANGE UP (ref 34.5–45)
HGB BLD-MCNC: 11.5 G/DL — SIGNIFICANT CHANGE UP (ref 11.5–15.5)
IMM GRANULOCYTES NFR BLD AUTO: 0.6 % — SIGNIFICANT CHANGE UP (ref 0–0.9)
INR BLD: 1.12 RATIO — SIGNIFICANT CHANGE UP (ref 0.88–1.16)
LIDOCAIN IGE QN: 465 U/L — HIGH (ref 22–51)
LYMPHOCYTES # BLD AUTO: 1.74 K/UL — SIGNIFICANT CHANGE UP (ref 1–3.3)
LYMPHOCYTES # BLD AUTO: 12.7 % — LOW (ref 13–44)
MAGNESIUM SERPL-MCNC: 2.3 MG/DL — SIGNIFICANT CHANGE UP (ref 1.6–2.6)
MCHC RBC-ENTMCNC: 27.4 PG — SIGNIFICANT CHANGE UP (ref 27–34)
MCHC RBC-ENTMCNC: 32 GM/DL — SIGNIFICANT CHANGE UP (ref 32–36)
MCV RBC AUTO: 85.5 FL — SIGNIFICANT CHANGE UP (ref 80–100)
MONOCYTES # BLD AUTO: 0.64 K/UL — SIGNIFICANT CHANGE UP (ref 0–0.9)
MONOCYTES NFR BLD AUTO: 4.7 % — SIGNIFICANT CHANGE UP (ref 2–14)
NEUTROPHILS # BLD AUTO: 11.05 K/UL — HIGH (ref 1.8–7.4)
NEUTROPHILS NFR BLD AUTO: 80.7 % — HIGH (ref 43–77)
NON-GYNECOLOGICAL CYTOLOGY STUDY: SIGNIFICANT CHANGE UP
PLATELET # BLD AUTO: 463 K/UL — HIGH (ref 150–400)
POTASSIUM SERPL-MCNC: 5.6 MMOL/L — HIGH (ref 3.5–5.3)
POTASSIUM SERPL-SCNC: 5.6 MMOL/L — HIGH (ref 3.5–5.3)
PROT SERPL-MCNC: 7.5 G/DL — SIGNIFICANT CHANGE UP (ref 6.6–8.7)
PROTHROM AB SERPL-ACNC: 13 SEC — SIGNIFICANT CHANGE UP (ref 10.5–13.4)
RBC # BLD: 4.2 M/UL — SIGNIFICANT CHANGE UP (ref 3.8–5.2)
RBC # FLD: 14.5 % — SIGNIFICANT CHANGE UP (ref 10.3–14.5)
SODIUM SERPL-SCNC: 136 MMOL/L — SIGNIFICANT CHANGE UP (ref 135–145)
WBC # BLD: 13.7 K/UL — HIGH (ref 3.8–10.5)
WBC # FLD AUTO: 13.7 K/UL — HIGH (ref 3.8–10.5)

## 2023-05-12 PROCEDURE — 99285 EMERGENCY DEPT VISIT HI MDM: CPT | Mod: 25

## 2023-05-12 PROCEDURE — 43762 RPLC GTUBE NO REVJ TRC: CPT

## 2023-05-12 RX ORDER — LIDOCAINE 4 G/100G
1 CREAM TOPICAL ONCE
Refills: 0 | Status: COMPLETED | OUTPATIENT
Start: 2023-05-12 | End: 2023-05-12

## 2023-05-12 RX ORDER — SODIUM CHLORIDE 9 MG/ML
1000 INJECTION INTRAMUSCULAR; INTRAVENOUS; SUBCUTANEOUS ONCE
Refills: 0 | Status: COMPLETED | OUTPATIENT
Start: 2023-05-12 | End: 2023-05-12

## 2023-05-12 RX ORDER — ONDANSETRON 8 MG/1
4 TABLET, FILM COATED ORAL ONCE
Refills: 0 | Status: COMPLETED | OUTPATIENT
Start: 2023-05-12 | End: 2023-05-12

## 2023-05-12 RX ADMIN — Medication 0.5 MILLIGRAM(S): at 22:35

## 2023-05-12 RX ADMIN — LIDOCAINE 1 APPLICATION(S): 4 CREAM TOPICAL at 22:37

## 2023-05-12 RX ADMIN — ONDANSETRON 4 MILLIGRAM(S): 8 TABLET, FILM COATED ORAL at 20:46

## 2023-05-12 RX ADMIN — SODIUM CHLORIDE 1000 MILLILITER(S): 9 INJECTION INTRAMUSCULAR; INTRAVENOUS; SUBCUTANEOUS at 20:46

## 2023-05-12 NOTE — ED PROCEDURE NOTE - CPROC ED FINDINGS1
positive return of gastric contents borborygmi with insufflation air/positive return of gastric contents

## 2023-05-12 NOTE — ED PROVIDER NOTE - PHYSICAL EXAMINATION
Constitutional: Awake, alert, in no acute distress  Eyes: no scleral icterus  HENT: normocephalic, atraumatic, +dry oral mucosa  Neck: supple  CV: RRR, no murmur  Pulm: non-labored respirations, CTAB  Abdomen: soft, non-tender, non-distended  Extremities: no edema, no deformity  Skin: no rash, no jaundice  Neuro: AAOx3, moving all extremities equally

## 2023-05-12 NOTE — ED PROVIDER NOTE - CLINICAL SUMMARY MEDICAL DECISION MAKING FREE TEXT BOX
68y F w/ hx HTN, duodenal mass, presents for nausea/vomiting after recent admission for biliary drain placement. Pt well appearing and hemodynamically stable. Treated with fluids. Check labs, consult surgery. 68y F w/ hx HTN, duodenal mass, presents for nausea/vomiting after recent admission for biliary drain placement. Pt well appearing and hemodynamically stable. Treated with fluids. Labs without acute findings; LFTs downtrending from prior. Spoke with surgery, who will admit.

## 2023-05-12 NOTE — ED ADULT TRIAGE NOTE - CHIEF COMPLAINT QUOTE
" I was newly diagnosed with cancer to my stomach and I had a mass that they where going to remove, one week ago I had a drain placed in my abdomen and I haven't been able to eat or drink anything" pt also co spasm to abdomen and N.V

## 2023-05-12 NOTE — ED PROVIDER NOTE - OBJECTIVE STATEMENT
68y F w/ hx HTN, recently diagnosed duodenal mass with biliary obstruction; presents for nausea and vomiting. Pt was just discharged from this hospital 3 days ago. Had EGD/EUS and internal/external biliary drains placed during admission. Pt says external drain has been draining normally; however reports decreased PO intake due to abdominal spasms. Has been passing flatus and had normal bowel movement earlier today. No fever. Says she called her surgeon Dr. Mckenzie and was advised to go to the ED to be admitted with plan for eventual surgery.

## 2023-05-12 NOTE — ED PROCEDURE NOTE - CPROC ED GASTRIC INTUB DETAIL1
The nasogastric tube (see size above) was inserted via the anatomic location./Gastric tube connected to low continuous suction. The nasogastric tube (see size above) was inserted via the anatomic location./Placement was confirmed by aspiration of gastric secretions./Gastric tube connected to low continuous suction.

## 2023-05-13 LAB
ALBUMIN SERPL ELPH-MCNC: 3.2 G/DL — LOW (ref 3.3–5.2)
ALP SERPL-CCNC: 134 U/L — HIGH (ref 40–120)
ALT FLD-CCNC: 84 U/L — HIGH
ANION GAP SERPL CALC-SCNC: 14 MMOL/L — SIGNIFICANT CHANGE UP (ref 5–17)
AST SERPL-CCNC: 35 U/L — HIGH
BASOPHILS # BLD AUTO: 0.05 K/UL — SIGNIFICANT CHANGE UP (ref 0–0.2)
BASOPHILS NFR BLD AUTO: 0.4 % — SIGNIFICANT CHANGE UP (ref 0–2)
BILIRUB SERPL-MCNC: 1.9 MG/DL — SIGNIFICANT CHANGE UP (ref 0.4–2)
BUN SERPL-MCNC: 23.2 MG/DL — HIGH (ref 8–20)
CALCIUM SERPL-MCNC: 8.5 MG/DL — SIGNIFICANT CHANGE UP (ref 8.4–10.5)
CHLORIDE SERPL-SCNC: 101 MMOL/L — SIGNIFICANT CHANGE UP (ref 96–108)
CO2 SERPL-SCNC: 23 MMOL/L — SIGNIFICANT CHANGE UP (ref 22–29)
CREAT SERPL-MCNC: 0.91 MG/DL — SIGNIFICANT CHANGE UP (ref 0.5–1.3)
EGFR: 68 ML/MIN/1.73M2 — SIGNIFICANT CHANGE UP
EOSINOPHIL # BLD AUTO: 0.21 K/UL — SIGNIFICANT CHANGE UP (ref 0–0.5)
EOSINOPHIL NFR BLD AUTO: 1.6 % — SIGNIFICANT CHANGE UP (ref 0–6)
GLUCOSE SERPL-MCNC: 114 MG/DL — HIGH (ref 70–99)
HCT VFR BLD CALC: 31.3 % — LOW (ref 34.5–45)
HGB BLD-MCNC: 9.9 G/DL — LOW (ref 11.5–15.5)
IMM GRANULOCYTES NFR BLD AUTO: 0.6 % — SIGNIFICANT CHANGE UP (ref 0–0.9)
LYMPHOCYTES # BLD AUTO: 1.79 K/UL — SIGNIFICANT CHANGE UP (ref 1–3.3)
LYMPHOCYTES # BLD AUTO: 14 % — SIGNIFICANT CHANGE UP (ref 13–44)
MAGNESIUM SERPL-MCNC: 2 MG/DL — SIGNIFICANT CHANGE UP (ref 1.8–2.6)
MCHC RBC-ENTMCNC: 27.7 PG — SIGNIFICANT CHANGE UP (ref 27–34)
MCHC RBC-ENTMCNC: 31.6 GM/DL — LOW (ref 32–36)
MCV RBC AUTO: 87.4 FL — SIGNIFICANT CHANGE UP (ref 80–100)
MONOCYTES # BLD AUTO: 0.83 K/UL — SIGNIFICANT CHANGE UP (ref 0–0.9)
MONOCYTES NFR BLD AUTO: 6.5 % — SIGNIFICANT CHANGE UP (ref 2–14)
NEUTROPHILS # BLD AUTO: 9.83 K/UL — HIGH (ref 1.8–7.4)
NEUTROPHILS NFR BLD AUTO: 76.9 % — SIGNIFICANT CHANGE UP (ref 43–77)
PHOSPHATE SERPL-MCNC: 3 MG/DL — SIGNIFICANT CHANGE UP (ref 2.4–4.7)
PLATELET # BLD AUTO: 370 K/UL — SIGNIFICANT CHANGE UP (ref 150–400)
POTASSIUM SERPL-MCNC: 4.3 MMOL/L — SIGNIFICANT CHANGE UP (ref 3.5–5.3)
POTASSIUM SERPL-SCNC: 4.3 MMOL/L — SIGNIFICANT CHANGE UP (ref 3.5–5.3)
PROT SERPL-MCNC: 6.1 G/DL — LOW (ref 6.6–8.7)
RBC # BLD: 3.58 M/UL — LOW (ref 3.8–5.2)
RBC # FLD: 14.9 % — HIGH (ref 10.3–14.5)
SODIUM SERPL-SCNC: 137 MMOL/L — SIGNIFICANT CHANGE UP (ref 135–145)
WBC # BLD: 12.79 K/UL — HIGH (ref 3.8–10.5)
WBC # FLD AUTO: 12.79 K/UL — HIGH (ref 3.8–10.5)

## 2023-05-13 PROCEDURE — 71045 X-RAY EXAM CHEST 1 VIEW: CPT | Mod: 26

## 2023-05-13 PROCEDURE — 99222 1ST HOSP IP/OBS MODERATE 55: CPT

## 2023-05-13 PROCEDURE — 99223 1ST HOSP IP/OBS HIGH 75: CPT

## 2023-05-13 PROCEDURE — 93010 ELECTROCARDIOGRAM REPORT: CPT

## 2023-05-13 RX ORDER — ENOXAPARIN SODIUM 100 MG/ML
40 INJECTION SUBCUTANEOUS EVERY 24 HOURS
Refills: 0 | Status: DISCONTINUED | OUTPATIENT
Start: 2023-05-13 | End: 2023-05-14

## 2023-05-13 RX ORDER — SODIUM CHLORIDE 9 MG/ML
1000 INJECTION INTRAMUSCULAR; INTRAVENOUS; SUBCUTANEOUS
Refills: 0 | Status: DISCONTINUED | OUTPATIENT
Start: 2023-05-13 | End: 2023-05-14

## 2023-05-13 RX ORDER — ACETAMINOPHEN 500 MG
1000 TABLET ORAL ONCE
Refills: 0 | Status: COMPLETED | OUTPATIENT
Start: 2023-05-13 | End: 2023-05-13

## 2023-05-13 RX ADMIN — SODIUM CHLORIDE 120 MILLILITER(S): 9 INJECTION INTRAMUSCULAR; INTRAVENOUS; SUBCUTANEOUS at 16:52

## 2023-05-13 RX ADMIN — SODIUM CHLORIDE 120 MILLILITER(S): 9 INJECTION INTRAMUSCULAR; INTRAVENOUS; SUBCUTANEOUS at 01:56

## 2023-05-13 RX ADMIN — Medication 1000 MILLIGRAM(S): at 18:43

## 2023-05-13 RX ADMIN — Medication 400 MILLIGRAM(S): at 18:18

## 2023-05-13 RX ADMIN — ENOXAPARIN SODIUM 40 MILLIGRAM(S): 100 INJECTION SUBCUTANEOUS at 05:09

## 2023-05-13 NOTE — ED ADULT NURSE NOTE - NSFALLUNIVINTERV_ED_ALL_ED
Bed/Stretcher in lowest position, wheels locked, appropriate side rails in place/Call bell, personal items and telephone in reach/Instruct patient to call for assistance before getting out of bed/chair/stretcher/Non-slip footwear applied when patient is off stretcher/[place] to call system/Physically safe environment - no spills, clutter or unnecessary equipment/Purposeful proactive rounding/Room/bathroom lighting operational, light cord in reach

## 2023-05-13 NOTE — H&P ADULT - ASSESSMENT
68F with metastatic adenocarcinoma with internal-external biliary drain presenting with PO intolerance    - Admit to Surgical Oncology under Dr. Mckenzie  - NGT placed  - NPO/IVF  - Medicine for optimization  - Plan for OR on this admission, possibly early next week  - Plan discussed with Dr. Mckenzie

## 2023-05-13 NOTE — H&P ADULT - HISTORY OF PRESENT ILLNESS
68F with recent admission for metastatic adenocarcinoma, malignant SBO and obstructive jaundice with internal/external drain placement by IR. She was ultimately discharged with plan for her to improve her nutritional status and return for a Whipple. She is now returning with ~2d of PO intolerance and abdominal pain/distension. She reports occasional abdominal spasms.

## 2023-05-13 NOTE — H&P ADULT - NSHPLABSRESULTS_GEN_ALL_CORE
LABS  CBC (05-12 @ 20:39)                              11.5                           13.70<H>  )----------------(  463<H>     80.7<H>% Neutrophils, 12.7<L>% Lymphocytes, ANC: 11.05<H>                              35.9      BMP (05-12 @ 20:39)             136     |  97      |  26.6<H>		Ca++ --      Ca 9.4                ---------------------------------( 137<H>		Mg 2.3                5.6<H>  |  24.0    |  0.97  			Ph --        LFTs (05-12 @ 20:39)      TPro 7.5 / Alb 4.0 / TBili 2.4<H> / DBili -- / AST 69<H> / <H> / AlkPhos 179<H>    Coags (05-12 @ 20:39)  aPTT 29.7 / INR 1.12 / PT 13.0    --------------------------------------------------------------------------------------------

## 2023-05-13 NOTE — H&P ADULT - NSHPPHYSICALEXAM_GEN_ALL_CORE
Vital Signs Last 24 Hrs  T(C): 36.7 (13 May 2023 02:06), Max: 36.8 (12 May 2023 23:46)  T(F): 98 (13 May 2023 02:06), Max: 98.3 (12 May 2023 23:46)  HR: 85 (13 May 2023 02:06) (85 - 94)  BP: 137/78 (13 May 2023 02:06) (128/65 - 153/67)  BP(mean): --  RR: 17 (13 May 2023 02:06) (17 - 20)  SpO2: 94% (13 May 2023 02:06) (94% - 98%)    Parameters below as of 13 May 2023 02:06  Patient On (Oxygen Delivery Method): room air    General: NAD, resting comfortably  Neurology: A&Ox3, moves all extremities  Respiratory: nonlabored breathing, normal chest wall expansion  Abdominal: Softly distended, tender to palpation in lower abdomen, no rebound or guarding  Vascular: Warm and well perfused  Extremities: No edema

## 2023-05-13 NOTE — H&P ADULT - ATTENDING COMMENTS
Risks, benefits, and alternatives discussed with the patient - she expressed understanding and agrees to proceed with exploratory laparotomy, possible Whipple procedure.

## 2023-05-13 NOTE — PATIENT PROFILE ADULT - FALL HARM RISK - UNIVERSAL INTERVENTIONS
Bed in lowest position, wheels locked, appropriate side rails in place/Call bell, personal items and telephone in reach/Instruct patient to call for assistance before getting out of bed or chair/Non-slip footwear when patient is out of bed/[place] to call system/Physically safe environment - no spills, clutter or unnecessary equipment/Purposeful Proactive Rounding/Room/bathroom lighting operational, light cord in reach

## 2023-05-14 LAB
ALBUMIN SERPL ELPH-MCNC: 2.7 G/DL — LOW (ref 3.3–5.2)
ALP SERPL-CCNC: 147 U/L — HIGH (ref 40–120)
ALT FLD-CCNC: 75 U/L — HIGH
ANION GAP SERPL CALC-SCNC: 22 MMOL/L — HIGH (ref 5–17)
APTT BLD: 34.6 SEC — SIGNIFICANT CHANGE UP (ref 27.5–35.5)
AST SERPL-CCNC: 54 U/L — HIGH
BASOPHILS # BLD AUTO: 0.05 K/UL — SIGNIFICANT CHANGE UP (ref 0–0.2)
BASOPHILS NFR BLD AUTO: 0.4 % — SIGNIFICANT CHANGE UP (ref 0–2)
BILIRUB SERPL-MCNC: 1.8 MG/DL — SIGNIFICANT CHANGE UP (ref 0.4–2)
BUN SERPL-MCNC: 20.3 MG/DL — HIGH (ref 8–20)
CALCIUM SERPL-MCNC: 8.6 MG/DL — SIGNIFICANT CHANGE UP (ref 8.4–10.5)
CHLORIDE SERPL-SCNC: 100 MMOL/L — SIGNIFICANT CHANGE UP (ref 96–108)
CO2 SERPL-SCNC: 17 MMOL/L — LOW (ref 22–29)
CREAT SERPL-MCNC: 0.78 MG/DL — SIGNIFICANT CHANGE UP (ref 0.5–1.3)
EGFR: 82 ML/MIN/1.73M2 — SIGNIFICANT CHANGE UP
EOSINOPHIL # BLD AUTO: 0.26 K/UL — SIGNIFICANT CHANGE UP (ref 0–0.5)
EOSINOPHIL NFR BLD AUTO: 2.3 % — SIGNIFICANT CHANGE UP (ref 0–6)
GLUCOSE SERPL-MCNC: 68 MG/DL — LOW (ref 70–99)
HCT VFR BLD CALC: 33.9 % — LOW (ref 34.5–45)
HGB BLD-MCNC: 10.5 G/DL — LOW (ref 11.5–15.5)
IMM GRANULOCYTES NFR BLD AUTO: 0.5 % — SIGNIFICANT CHANGE UP (ref 0–0.9)
INR BLD: 1.17 RATIO — HIGH (ref 0.88–1.16)
LYMPHOCYTES # BLD AUTO: 1.58 K/UL — SIGNIFICANT CHANGE UP (ref 1–3.3)
LYMPHOCYTES # BLD AUTO: 14.1 % — SIGNIFICANT CHANGE UP (ref 13–44)
MAGNESIUM SERPL-MCNC: 1.9 MG/DL — SIGNIFICANT CHANGE UP (ref 1.6–2.6)
MCHC RBC-ENTMCNC: 27.3 PG — SIGNIFICANT CHANGE UP (ref 27–34)
MCHC RBC-ENTMCNC: 31 GM/DL — LOW (ref 32–36)
MCV RBC AUTO: 88.1 FL — SIGNIFICANT CHANGE UP (ref 80–100)
MONOCYTES # BLD AUTO: 0.67 K/UL — SIGNIFICANT CHANGE UP (ref 0–0.9)
MONOCYTES NFR BLD AUTO: 6 % — SIGNIFICANT CHANGE UP (ref 2–14)
NEUTROPHILS # BLD AUTO: 8.59 K/UL — HIGH (ref 1.8–7.4)
NEUTROPHILS NFR BLD AUTO: 76.7 % — SIGNIFICANT CHANGE UP (ref 43–77)
PHOSPHATE SERPL-MCNC: 3.4 MG/DL — SIGNIFICANT CHANGE UP (ref 2.4–4.7)
PLATELET # BLD AUTO: 424 K/UL — HIGH (ref 150–400)
POTASSIUM SERPL-MCNC: 4 MMOL/L — SIGNIFICANT CHANGE UP (ref 3.5–5.3)
POTASSIUM SERPL-SCNC: 4 MMOL/L — SIGNIFICANT CHANGE UP (ref 3.5–5.3)
PROT SERPL-MCNC: 6.1 G/DL — LOW (ref 6.6–8.7)
PROTHROM AB SERPL-ACNC: 13.6 SEC — HIGH (ref 10.5–13.4)
RBC # BLD: 3.85 M/UL — SIGNIFICANT CHANGE UP (ref 3.8–5.2)
RBC # FLD: 14.4 % — SIGNIFICANT CHANGE UP (ref 10.3–14.5)
SODIUM SERPL-SCNC: 139 MMOL/L — SIGNIFICANT CHANGE UP (ref 135–145)
WBC # BLD: 11.21 K/UL — HIGH (ref 3.8–10.5)
WBC # FLD AUTO: 11.21 K/UL — HIGH (ref 3.8–10.5)

## 2023-05-14 PROCEDURE — 99223 1ST HOSP IP/OBS HIGH 75: CPT

## 2023-05-14 PROCEDURE — 99231 SBSQ HOSP IP/OBS SF/LOW 25: CPT

## 2023-05-14 PROCEDURE — 99232 SBSQ HOSP IP/OBS MODERATE 35: CPT

## 2023-05-14 RX ORDER — SODIUM CHLORIDE 9 MG/ML
1000 INJECTION, SOLUTION INTRAVENOUS
Refills: 0 | Status: DISCONTINUED | OUTPATIENT
Start: 2023-05-14 | End: 2023-05-15

## 2023-05-14 RX ORDER — ACETAMINOPHEN 500 MG
650 TABLET ORAL EVERY 6 HOURS
Refills: 0 | Status: DISCONTINUED | OUTPATIENT
Start: 2023-05-14 | End: 2023-05-15

## 2023-05-14 RX ORDER — ALPRAZOLAM 0.25 MG
0.25 TABLET ORAL ONCE
Refills: 0 | Status: DISCONTINUED | OUTPATIENT
Start: 2023-05-14 | End: 2023-05-14

## 2023-05-14 RX ORDER — BENZOCAINE 10 %
1 GEL (GRAM) MUCOUS MEMBRANE ONCE
Refills: 0 | Status: COMPLETED | OUTPATIENT
Start: 2023-05-14 | End: 2023-05-14

## 2023-05-14 RX ORDER — HYDRALAZINE HCL 50 MG
10 TABLET ORAL ONCE
Refills: 0 | Status: COMPLETED | OUTPATIENT
Start: 2023-05-14 | End: 2023-05-14

## 2023-05-14 RX ADMIN — Medication 650 MILLIGRAM(S): at 08:00

## 2023-05-14 RX ADMIN — Medication 650 MILLIGRAM(S): at 19:07

## 2023-05-14 RX ADMIN — Medication 650 MILLIGRAM(S): at 18:37

## 2023-05-14 RX ADMIN — Medication 650 MILLIGRAM(S): at 07:00

## 2023-05-14 RX ADMIN — Medication 0.25 MILLIGRAM(S): at 23:52

## 2023-05-14 RX ADMIN — SODIUM CHLORIDE 100 MILLILITER(S): 9 INJECTION, SOLUTION INTRAVENOUS at 22:13

## 2023-05-14 RX ADMIN — Medication 1 SPRAY(S): at 16:37

## 2023-05-14 RX ADMIN — ENOXAPARIN SODIUM 40 MILLIGRAM(S): 100 INJECTION SUBCUTANEOUS at 05:00

## 2023-05-14 NOTE — CONSULT NOTE ADULT - ASSESSMENT
68F with PMH of HTN , chronic smoker with recent admission for duodenal mass ,   metastatic adenocarcinoma, malignant SBO and obstructive jaundice with internal/external drain placement by IR.  She was ultimately discharged with plan for her to improve her nutritional status and return for a Whipple. She is now returning with ~2d of PO intolerance and abdominal pain/distension. She reports occasional abdominal spasms.      1. Duodenal abstracting mass , pathology report with  metastatic adenocarcinoma -   not able to tolerate PO , no flatus with malignant SBO and obstructive jaundice,   recent admission with  internal/external drain placement by IR.   Planned for surgery at this admission . Medicine consulted for medical optimization.   S/P NGT,   IVF- noted with Hypoglycemia ( 68 ) -   consider to change NS to D5 1/2 NS   pain mgmt      2. HTN - takes  losartan-hydroCHLOROthiazide 100 mg-12.5 mg oral tablet: 1 orally once a day   hold for now ,  SBP at 160 - may give Hydralazine 10 mg ivp Q6hrs for SBP > 170     3. Metabolic acidosis - continue ivf     4. Mild leucocytosis - no S/S of infection - likely reactive     5. Anemia -likely of chronic  monitor H/H     Labs/ EKG reviewed .   Revised Cardiac Risk Assessment   [-  ]History of ischemic heart disease   [ - ]History of congestive heart failure   [ - ]History of cerebrovascular disease (stroke or transient ischemic attack)   [ - ]History of diabetes requiring preoperative insulin use   [-  ]Chronic kidney disease (creatinine > 2 mg/dL)   [+   ]Undergoing suprainguinal vascular, intraperitoneal, or intrathoracic surgery     0 predictors = 0.4%, 1 predictor = 1.0%, 2 predictors = 2.4%, > 3 predictors = 5.4%    * Overall this patient has a  1%  risk of cardiac death, nonfatal myocardial infarction, and nonfatal cardiac arrest perioperatively.   Patient does not have any known history of severe valvular disease and is not in decompensated CHF. No recent MI.   No sob/ chest pain at present .Baseline functional capacity is > 4 METS. Patient is currently hemodynamically stable.   Patient is medically optimized for panned Whipple  high risk procedure .     Thank you for the courtesy of this consult .   Will not follow up daily .   Please call if any medical issue .  68F with PMH of HTN , chronic smoker with recent admission for duodenal mass ,   metastatic adenocarcinoma, malignant SBO and obstructive jaundice with internal/external drain placement by IR.  She was ultimately discharged with plan for her to improve her nutritional status and return for a Whipple. She is now returning with ~2d of PO intolerance and abdominal pain/distension. She reports occasional abdominal spasms.      1. Duodenal abstracting mass , pathology report with  metastatic adenocarcinoma -   not able to tolerate PO , no flatus with malignant SBO and obstructive jaundice,   recent admission with  internal/external drain placement by IR.   Planned for surgery at this admission . Medicine consulted for medical optimization.   S/P NGT,   IVF- noted with Hypoglycemia ( 68 ) -   consider to change NS to D5 1/2 NS   pain mgmt      2. HTN - takes  losartan-hydroCHLOROthiazide 100 mg-12.5 mg oral tablet: 1 orally once a day   hold for now ,  SBP at 160 - may give Hydralazine 10 mg ivp Q6hrs for SBP > 170     3. Metabolic acidosis - continue ivf     4. Mild leucocytosis - no S/S of infection - likely reactive     5. Anemia -likely of chronic  monitor H/H     6. Transaminitis - due to obstructing duodenal mass -   trend LTt's - avoid hepatotoxic medications .     Labs/ EKG reviewed .   Revised Cardiac Risk Assessment   [-  ]History of ischemic heart disease   [ - ]History of congestive heart failure   [ - ]History of cerebrovascular disease (stroke or transient ischemic attack)   [ - ]History of diabetes requiring preoperative insulin use   [-  ]Chronic kidney disease (creatinine > 2 mg/dL)   [+   ]Undergoing suprainguinal vascular, intraperitoneal, or intrathoracic surgery     0 predictors = 0.4%, 1 predictor = 1.0%, 2 predictors = 2.4%, > 3 predictors = 5.4%    * Overall this patient has a  1%  risk of cardiac death, nonfatal myocardial infarction, and nonfatal cardiac arrest perioperatively.   Patient does not have any known history of severe valvular disease and is not in decompensated CHF. No recent MI.   No sob/ chest pain at present .Baseline functional capacity is > 4 METS. Patient is currently hemodynamically stable.   Patient is medically optimized for panned Whipple  high risk procedure .     Thank you for the courtesy of this consult .   Will not follow up daily .   Please call if any medical issue .

## 2023-05-15 ENCOUNTER — APPOINTMENT (OUTPATIENT)
Dept: FAMILY MEDICINE | Facility: CLINIC | Age: 68
End: 2023-05-15

## 2023-05-15 ENCOUNTER — RESULT REVIEW (OUTPATIENT)
Age: 68
End: 2023-05-15

## 2023-05-15 ENCOUNTER — TRANSCRIPTION ENCOUNTER (OUTPATIENT)
Age: 68
End: 2023-05-15

## 2023-05-15 LAB
ALBUMIN SERPL ELPH-MCNC: 2.7 G/DL — LOW (ref 3.3–5.2)
ALP SERPL-CCNC: 94 U/L — SIGNIFICANT CHANGE UP (ref 40–120)
ALT FLD-CCNC: 327 U/L — HIGH
AMYLASE P1 CFR SERPL: 171 U/L — HIGH (ref 36–128)
ANION GAP SERPL CALC-SCNC: 12 MMOL/L — SIGNIFICANT CHANGE UP (ref 5–17)
ANION GAP SERPL CALC-SCNC: 17 MMOL/L — SIGNIFICANT CHANGE UP (ref 5–17)
AST SERPL-CCNC: 304 U/L — HIGH
BASOPHILS # BLD AUTO: 0 K/UL — SIGNIFICANT CHANGE UP (ref 0–0.2)
BASOPHILS NFR BLD AUTO: 0 % — SIGNIFICANT CHANGE UP (ref 0–2)
BILIRUB DIRECT SERPL-MCNC: 1.3 MG/DL — HIGH (ref 0–0.3)
BILIRUB INDIRECT FLD-MCNC: 0.5 MG/DL — SIGNIFICANT CHANGE UP (ref 0.2–1)
BILIRUB SERPL-MCNC: 1.8 MG/DL — SIGNIFICANT CHANGE UP (ref 0.4–2)
BLD GP AB SCN SERPL QL: SIGNIFICANT CHANGE UP
BUN SERPL-MCNC: 13.8 MG/DL — SIGNIFICANT CHANGE UP (ref 8–20)
BUN SERPL-MCNC: 9.3 MG/DL — SIGNIFICANT CHANGE UP (ref 8–20)
CALCIUM SERPL-MCNC: 7.2 MG/DL — LOW (ref 8.4–10.5)
CALCIUM SERPL-MCNC: 8.3 MG/DL — LOW (ref 8.4–10.5)
CHLORIDE SERPL-SCNC: 104 MMOL/L — SIGNIFICANT CHANGE UP (ref 96–108)
CHLORIDE SERPL-SCNC: 105 MMOL/L — SIGNIFICANT CHANGE UP (ref 96–108)
CO2 SERPL-SCNC: 20 MMOL/L — LOW (ref 22–29)
CO2 SERPL-SCNC: 21 MMOL/L — LOW (ref 22–29)
CREAT SERPL-MCNC: 0.65 MG/DL — SIGNIFICANT CHANGE UP (ref 0.5–1.3)
CREAT SERPL-MCNC: 0.75 MG/DL — SIGNIFICANT CHANGE UP (ref 0.5–1.3)
EGFR: 86 ML/MIN/1.73M2 — SIGNIFICANT CHANGE UP
EGFR: 95 ML/MIN/1.73M2 — SIGNIFICANT CHANGE UP
EOSINOPHIL # BLD AUTO: 0 K/UL — SIGNIFICANT CHANGE UP (ref 0–0.5)
EOSINOPHIL NFR BLD AUTO: 0 % — SIGNIFICANT CHANGE UP (ref 0–6)
GAS PNL BLDA: SIGNIFICANT CHANGE UP
GAS PNL BLDA: SIGNIFICANT CHANGE UP
GIANT PLATELETS BLD QL SMEAR: PRESENT — SIGNIFICANT CHANGE UP
GLUCOSE BLDC GLUCOMTR-MCNC: 181 MG/DL — HIGH (ref 70–99)
GLUCOSE SERPL-MCNC: 151 MG/DL — HIGH (ref 70–99)
GLUCOSE SERPL-MCNC: 178 MG/DL — HIGH (ref 70–99)
HCT VFR BLD CALC: 28 % — LOW (ref 34.5–45)
HCT VFR BLD CALC: 30.5 % — LOW (ref 34.5–45)
HGB BLD-MCNC: 9.2 G/DL — LOW (ref 11.5–15.5)
HGB BLD-MCNC: 9.7 G/DL — LOW (ref 11.5–15.5)
INR BLD: 1.3 RATIO — HIGH (ref 0.88–1.16)
LACTATE SERPL-SCNC: 1.3 MMOL/L — SIGNIFICANT CHANGE UP (ref 0.5–2)
LYMPHOCYTES # BLD AUTO: 0.89 K/UL — LOW (ref 1–3.3)
LYMPHOCYTES # BLD AUTO: 4.4 % — LOW (ref 13–44)
MAGNESIUM SERPL-MCNC: 1.2 MG/DL — LOW (ref 1.6–2.6)
MAGNESIUM SERPL-MCNC: 1.7 MG/DL — SIGNIFICANT CHANGE UP (ref 1.6–2.6)
MANUAL SMEAR VERIFICATION: SIGNIFICANT CHANGE UP
MCHC RBC-ENTMCNC: 27.2 PG — SIGNIFICANT CHANGE UP (ref 27–34)
MCHC RBC-ENTMCNC: 27.5 PG — SIGNIFICANT CHANGE UP (ref 27–34)
MCHC RBC-ENTMCNC: 31.8 GM/DL — LOW (ref 32–36)
MCHC RBC-ENTMCNC: 32.9 GM/DL — SIGNIFICANT CHANGE UP (ref 32–36)
MCV RBC AUTO: 83.8 FL — SIGNIFICANT CHANGE UP (ref 80–100)
MCV RBC AUTO: 85.4 FL — SIGNIFICANT CHANGE UP (ref 80–100)
MONOCYTES # BLD AUTO: 0.52 K/UL — SIGNIFICANT CHANGE UP (ref 0–0.9)
MONOCYTES NFR BLD AUTO: 2.6 % — SIGNIFICANT CHANGE UP (ref 2–14)
NEUTROPHILS # BLD AUTO: 18.78 K/UL — HIGH (ref 1.8–7.4)
NEUTROPHILS NFR BLD AUTO: 93 % — HIGH (ref 43–77)
PHOSPHATE SERPL-MCNC: 2.5 MG/DL — SIGNIFICANT CHANGE UP (ref 2.4–4.7)
PHOSPHATE SERPL-MCNC: 3.6 MG/DL — SIGNIFICANT CHANGE UP (ref 2.4–4.7)
PLAT MORPH BLD: NORMAL — SIGNIFICANT CHANGE UP
PLATELET # BLD AUTO: 359 K/UL — SIGNIFICANT CHANGE UP (ref 150–400)
PLATELET # BLD AUTO: 382 K/UL — SIGNIFICANT CHANGE UP (ref 150–400)
POTASSIUM SERPL-MCNC: 3.3 MMOL/L — LOW (ref 3.5–5.3)
POTASSIUM SERPL-MCNC: 4.2 MMOL/L — SIGNIFICANT CHANGE UP (ref 3.5–5.3)
POTASSIUM SERPL-SCNC: 3.3 MMOL/L — LOW (ref 3.5–5.3)
POTASSIUM SERPL-SCNC: 4.2 MMOL/L — SIGNIFICANT CHANGE UP (ref 3.5–5.3)
PROT SERPL-MCNC: 4.7 G/DL — LOW (ref 6.6–8.7)
PROTHROM AB SERPL-ACNC: 15.1 SEC — HIGH (ref 10.5–13.4)
RBC # BLD: 3.34 M/UL — LOW (ref 3.8–5.2)
RBC # BLD: 3.57 M/UL — LOW (ref 3.8–5.2)
RBC # FLD: 14.4 % — SIGNIFICANT CHANGE UP (ref 10.3–14.5)
RBC # FLD: 14.6 % — HIGH (ref 10.3–14.5)
RBC BLD AUTO: NORMAL — SIGNIFICANT CHANGE UP
SODIUM SERPL-SCNC: 137 MMOL/L — SIGNIFICANT CHANGE UP (ref 135–145)
SODIUM SERPL-SCNC: 142 MMOL/L — SIGNIFICANT CHANGE UP (ref 135–145)
WBC # BLD: 20.19 K/UL — HIGH (ref 3.8–10.5)
WBC # BLD: 9.28 K/UL — SIGNIFICANT CHANGE UP (ref 3.8–10.5)
WBC # FLD AUTO: 20.19 K/UL — HIGH (ref 3.8–10.5)
WBC # FLD AUTO: 9.28 K/UL — SIGNIFICANT CHANGE UP (ref 3.8–10.5)

## 2023-05-15 PROCEDURE — 88300 SURGICAL PATH GROSS: CPT | Mod: 26,59

## 2023-05-15 PROCEDURE — 48150 PARTIAL REMOVAL OF PANCREAS: CPT

## 2023-05-15 PROCEDURE — 74018 RADEX ABDOMEN 1 VIEW: CPT | Mod: 26

## 2023-05-15 PROCEDURE — 99291 CRITICAL CARE FIRST HOUR: CPT

## 2023-05-15 PROCEDURE — 88304 TISSUE EXAM BY PATHOLOGIST: CPT | Mod: 26

## 2023-05-15 PROCEDURE — 88305 TISSUE EXAM BY PATHOLOGIST: CPT | Mod: 26

## 2023-05-15 PROCEDURE — 49402 REMOVE FOREIGN BODY ADBOMEN: CPT

## 2023-05-15 PROCEDURE — 48150 PARTIAL REMOVAL OF PANCREAS: CPT | Mod: AS

## 2023-05-15 PROCEDURE — 88309 TISSUE EXAM BY PATHOLOGIST: CPT | Mod: 26

## 2023-05-15 DEVICE — SURGICEL 2 X 14": Type: IMPLANTABLE DEVICE | Status: FUNCTIONAL

## 2023-05-15 DEVICE — STAPLER COVIDIEN TRI-STAPLE 60MM BLACK RELOAD: Type: IMPLANTABLE DEVICE | Status: FUNCTIONAL

## 2023-05-15 DEVICE — STAPLER COVIDIEN TRI-STAPLE CURVED 45MM TAN RELOAD: Type: IMPLANTABLE DEVICE | Status: FUNCTIONAL

## 2023-05-15 DEVICE — LIGATING CLIPS WECK HORIZON MEDIUM (BLUE) 24: Type: IMPLANTABLE DEVICE | Status: FUNCTIONAL

## 2023-05-15 DEVICE — STAPLER COVIDIEN TRI-STAPLE 60MM PURPLE RELOAD: Type: IMPLANTABLE DEVICE | Status: FUNCTIONAL

## 2023-05-15 DEVICE — STAPLER COVIDIEN ENDO GIA 45MM GREY RELOAD: Type: IMPLANTABLE DEVICE | Status: FUNCTIONAL

## 2023-05-15 DEVICE — LIGATING CLIPS WECK HORIZON LARGE (ORANGE) 6: Type: IMPLANTABLE DEVICE | Status: FUNCTIONAL

## 2023-05-15 RX ORDER — NOREPINEPHRINE BITARTRATE/D5W 8 MG/250ML
0.02 PLASTIC BAG, INJECTION (ML) INTRAVENOUS
Qty: 8 | Refills: 0 | Status: DISCONTINUED | OUTPATIENT
Start: 2023-05-15 | End: 2023-05-16

## 2023-05-15 RX ORDER — ALBUMIN HUMAN 25 %
250 VIAL (ML) INTRAVENOUS ONCE
Refills: 0 | Status: COMPLETED | OUTPATIENT
Start: 2023-05-15 | End: 2023-05-15

## 2023-05-15 RX ORDER — CHLORHEXIDINE GLUCONATE 213 G/1000ML
1 SOLUTION TOPICAL DAILY
Refills: 0 | Status: DISCONTINUED | OUTPATIENT
Start: 2023-05-15 | End: 2023-05-17

## 2023-05-15 RX ORDER — POTASSIUM CHLORIDE 20 MEQ
20 PACKET (EA) ORAL
Refills: 0 | Status: DISCONTINUED | OUTPATIENT
Start: 2023-05-15 | End: 2023-05-15

## 2023-05-15 RX ORDER — SODIUM CHLORIDE 9 MG/ML
1000 INJECTION, SOLUTION INTRAVENOUS
Refills: 0 | Status: DISCONTINUED | OUTPATIENT
Start: 2023-05-15 | End: 2023-05-16

## 2023-05-15 RX ORDER — SODIUM CHLORIDE 9 MG/ML
1000 INJECTION, SOLUTION INTRAVENOUS
Refills: 0 | Status: DISCONTINUED | OUTPATIENT
Start: 2023-05-15 | End: 2023-05-15

## 2023-05-15 RX ORDER — MAGNESIUM SULFATE 500 MG/ML
2 VIAL (ML) INJECTION ONCE
Refills: 0 | Status: COMPLETED | OUTPATIENT
Start: 2023-05-15 | End: 2023-05-15

## 2023-05-15 RX ORDER — METOPROLOL TARTRATE 50 MG
5 TABLET ORAL EVERY 6 HOURS
Refills: 0 | Status: DISCONTINUED | OUTPATIENT
Start: 2023-05-15 | End: 2023-05-15

## 2023-05-15 RX ORDER — ACETAMINOPHEN 500 MG
1000 TABLET ORAL EVERY 6 HOURS
Refills: 0 | Status: DISCONTINUED | OUTPATIENT
Start: 2023-05-15 | End: 2023-05-16

## 2023-05-15 RX ORDER — PIPERACILLIN AND TAZOBACTAM 4; .5 G/20ML; G/20ML
3.38 INJECTION, POWDER, LYOPHILIZED, FOR SOLUTION INTRAVENOUS EVERY 8 HOURS
Refills: 0 | Status: DISCONTINUED | OUTPATIENT
Start: 2023-05-15 | End: 2023-05-18

## 2023-05-15 RX ORDER — PANTOPRAZOLE SODIUM 20 MG/1
40 TABLET, DELAYED RELEASE ORAL DAILY
Refills: 0 | Status: DISCONTINUED | OUTPATIENT
Start: 2023-05-15 | End: 2023-05-19

## 2023-05-15 RX ORDER — ACETAMINOPHEN 500 MG
1000 TABLET ORAL ONCE
Refills: 0 | Status: COMPLETED | OUTPATIENT
Start: 2023-05-15 | End: 2023-05-15

## 2023-05-15 RX ORDER — SODIUM CHLORIDE 9 MG/ML
1000 INJECTION INTRAMUSCULAR; INTRAVENOUS; SUBCUTANEOUS ONCE
Refills: 0 | Status: COMPLETED | OUTPATIENT
Start: 2023-05-15 | End: 2023-05-15

## 2023-05-15 RX ORDER — CALCIUM GLUCONATE 100 MG/ML
2 VIAL (ML) INTRAVENOUS ONCE
Refills: 0 | Status: COMPLETED | OUTPATIENT
Start: 2023-05-15 | End: 2023-05-15

## 2023-05-15 RX ORDER — ENOXAPARIN SODIUM 100 MG/ML
40 INJECTION SUBCUTANEOUS EVERY 24 HOURS
Refills: 0 | Status: DISCONTINUED | OUTPATIENT
Start: 2023-05-15 | End: 2023-05-19

## 2023-05-15 RX ADMIN — SODIUM CHLORIDE 75 MILLILITER(S): 9 INJECTION, SOLUTION INTRAVENOUS at 17:51

## 2023-05-15 RX ADMIN — Medication 1000 MILLIGRAM(S): at 22:16

## 2023-05-15 RX ADMIN — Medication 400 MILLIGRAM(S): at 22:01

## 2023-05-15 RX ADMIN — Medication 25 GRAM(S): at 17:51

## 2023-05-15 RX ADMIN — PIPERACILLIN AND TAZOBACTAM 25 GRAM(S): 4; .5 INJECTION, POWDER, LYOPHILIZED, FOR SOLUTION INTRAVENOUS at 22:01

## 2023-05-15 RX ADMIN — Medication 125 MILLILITER(S): at 22:00

## 2023-05-15 RX ADMIN — Medication 10 MILLIGRAM(S): at 00:09

## 2023-05-15 RX ADMIN — Medication 200 GRAM(S): at 17:51

## 2023-05-15 RX ADMIN — ENOXAPARIN SODIUM 40 MILLIGRAM(S): 100 INJECTION SUBCUTANEOUS at 17:43

## 2023-05-15 RX ADMIN — SODIUM CHLORIDE 1000 MILLILITER(S): 9 INJECTION INTRAMUSCULAR; INTRAVENOUS; SUBCUTANEOUS at 16:15

## 2023-05-15 NOTE — BRIEF OPERATIVE NOTE - SPECIMENS
Product of whipple, Portocaval lymph node, SMA lymph node, Gallbladder, common hepatic artery lymph node.

## 2023-05-15 NOTE — BRIEF OPERATIVE NOTE - OPERATION/FINDINGS
Exploratory Laparotomy. No evidence of peritoneal spread. Internal-external biliary drain removed. Transverse and ascending colon mobilized. Duodenum Kocherized. Cholecystectomy performed, cystic artery and duct clipped and transected. GDA isolated with vessel loop. Jejunum transected 10cm distal to Ligament of Treitz and brought up in retrocolic fashion. Distal gastrectomy performed. CDB isolated and transected distally, reminder of biliary drain removed. Pancreatic head resected and specimen removed in one piece. Portocaval and SMA lymph nodes resected. Pancreaticojejunostomy performed, then hepaticojejunostomy, and then gastrojejunostomy.

## 2023-05-15 NOTE — CONSULT NOTE ADULT - SUBJECTIVE AND OBJECTIVE BOX
INTERVAL HPI/OVERNIGHT EVENTS/SUBJECTIVE:  69 yo female w PMHx of HTN and emphysema who had recent admission for metastatic adenocarcinoma, malignant SBO and obstructive jaundice with internal/ external drain placement by IR. She was discharged with plan for her to improve her nutritional status and return for a Whipple. She returned on 5/12 with 2 days of PO intolerance and abdominal pain/distension. Pt taken for Whipple procedure on 5/15 and was evaluated at bedside upon arriving to the SICU. Pt was hypotensive after returning from OR so started on Levo. Pt has no current complaints.    ICU Vital Signs Last 24 Hrs  T(C): 37.1 (15 May 2023 06:27), Max: 37.1 (15 May 2023 06:27)  T(F): 98.8 (15 May 2023 06:27), Max: 98.8 (15 May 2023 06:27)  HR: 85 (15 May 2023 16:15) (78 - 106)  BP: 82/51 (15 May 2023 16:15) (82/51 - 185/68)  BP(mean): 61 (15 May 2023 16:15) (61 - 92)  ABP: 97/34 (15 May 2023 16:15) (85/42 - 97/34)  ABP(mean): 52 (15 May 2023 16:15) (52 - 60)  RR: 16 (15 May 2023 16:15) (11 - 18)  SpO2: 98% (15 May 2023 16:15) (94% - 100%)    O2 Parameters below as of 15 May 2023 16:00  Patient On (Oxygen Delivery Method): room air            I&O's Detail    14 May 2023 07:01  -  15 May 2023 07:00  --------------------------------------------------------  IN:  Total IN: 0 mL    OUT:    Drain (mL): 500 mL    Nasogastric/Oral tube (mL): 1275 mL  Total OUT: 1775 mL    Total NET: -1775 mL            ABG - ( 15 May 2023 12:18 )  pH, Arterial: 7.310 pH, Blood: x     /  pCO2: 43    /  pO2: 422   / HCO3: 22    / Base Excess: -4.6  /  SaO2: 100.0               MEDICATIONS  (STANDING):  acetaminophen   IVPB .. 1000 milliGRAM(s) IV Intermittent once  bisacodyl Suppository 10 milliGRAM(s) Rectal daily  enoxaparin Injectable 40 milliGRAM(s) SubCutaneous every 24 hours  metoprolol tartrate Injectable 5 milliGRAM(s) IV Push every 6 hours  pantoprazole  Injectable 40 milliGRAM(s) IV Push daily  piperacillin/tazobactam IVPB.. 3.375 Gram(s) IV Intermittent every 8 hours    MEDICATIONS  (PRN):      NUTRITION/IVF: NPO/ Dextrose 5% + sodium chloride 0.45    ESPINOZA:   in place    A-LINE:    LOCATION: left radial       NG/OG TUBE:  DATE INSERTED: 5/12/23        PHYSICAL EXAM:     Gen: NAD, No cyanosis. +Pallor    Eyes: EOMI, pale conjunctiva    Neurological: A&Ox3, GCS 15, No focal defecit    Pulmonary: NAD, CTA, = BL .      Cardiovascular: RRR, S1, S2    Gastrointestinal: ND, Soft, dressing clean, dry, intact. Left and right bulb drains putting out serosanguinous fluid.    Genitourinary: espinoza catheter putting out clear/ yellow urine          LABS:  CBC Full  -  ( 15 May 2023 02:52 )  WBC Count : 9.28 K/uL  RBC Count : 3.57 M/uL  Hemoglobin : 9.7 g/dL  Hematocrit : 30.5 %  Platelet Count - Automated : 359 K/uL  Mean Cell Volume : 85.4 fl  Mean Cell Hemoglobin : 27.2 pg  Mean Cell Hemoglobin Concentration : 31.8 gm/dL  Auto Neutrophil # : x  Auto Lymphocyte # : x  Auto Monocyte # : x  Auto Eosinophil # : x  Auto Basophil # : x  Auto Neutrophil % : x  Auto Lymphocyte % : x  Auto Monocyte % : x  Auto Eosinophil % : x  Auto Basophil % : x    05-15    142  |  104  |  13.8  ----------------------------<  151<H>  3.3<L>   |  21.0<L>  |  0.75    Ca    8.3<L>      15 May 2023 02:52  Phos  2.5     05-15  Mg     1.7     05-15    TPro  6.1<L>  /  Alb  2.7<L>  /  TBili  1.8  /  DBili  x   /  AST  54<H>  /  ALT  75<H>  /  AlkPhos  147<H>  05-14    PT/INR - ( 15 May 2023 16:10 )   PT: 15.1 sec;   INR: 1.30 ratio         PTT - ( 14 May 2023 05:20 )  PTT:34.6 sec    RECENT CULTURES:      LIVER FUNCTIONS - ( 14 May 2023 05:20 )  Alb: 2.7 g/dL / Pro: 6.1 g/dL / ALK PHOS: 147 U/L / ALT: 75 U/L / AST: 54 U/L / GGT: x               ASSESSMENT/PLAN:  68yFemale presenting with metastatic adenocarcinoma, malignant SBO and obstructive jaundice s/p Whipple procedure on 5/15.  Hypotension        Neurological: Delirium precautions should be taken by making sure lights are on during the day and off at night. I have ordered Ofirmev Q6 ATC. Will give Narcotics if needed.  Currently does not seem to need PCA.    Pulmonary: Encourage use of incentive spirometer during hospital stay.    Cardiovascular: Pt returned from the OR and became hypotensive with MAPs in the 50s .  POCUS: IVC poorly visulaized.  Heart: Good gross systolic function, No pericardial effusion, No R Heart Dilation. Widened pulse pressure suggests distributive shock more likely vs hypovolemia.  I have ordered 1 L IVF Bolus stat and I prescribed Levo to reach goal MAPs of 65-75.    Gastrointestinal: Pt is strict NPO with NGT in place. Continue to check surgical wound dressing to ensure it is clean, dry, and intact not showing any signs of infection. Monitor closely outputs from LLQ and RLQ INDER drains.    Genitourinary: Monitor urine output using espinoza catheter that is in place. Plan for trial of void tomorrow if urine output remains adequate.    Heme: DVT prophylaxis with Lovenox and SCDs.    ID: Pt on Zosyn to cover for organisms responsible for intraabdominal infection.    Lines/ Tubes: Peripheral IVs, left radial A-line, NGT    Dispo: Pt admitted to SICU d/t need for BP management with pressors.    CRITICAL CARE TIME SPENT: 64 minutes    Discussed with Dr Pineda at the bedside
  Patient is very pleasant  a 68y old  Female who presents with a chief complaint of PO intolerance . Medicine consulted for medical mgmt .   Patient seen and examined . C/O throat discomfort from NGT. Nausea , vomiting and abdominal pain  resolved after placement of NGT  , no flatus , no BM  for last 2 days . Denies sob/ chest pain .     CC: nausea , vomiting , abdominal pain and PO intolerance for couple days - now resolved with NGT placement .         HPI:  68F with PMH of HTN , chronic smoker with recent admission for duodenal mass ,   metastatic adenocarcinoma, malignant SBO and obstructive jaundice with internal/external drain placement by IR.  She was ultimately discharged with plan for her to improve her nutritional status and return for a Whipple. She is now returning with ~2d of PO intolerance and abdominal pain/distension. She reports occasional abdominal spasms.        PAST MEDICAL & SURGICAL HISTORY:  HTN (hypertension)      Duodenal mass          Social History:  Tabacco -   ETOH -   Illicit drug abuse - denies    FAMILY HISTORY:      Allergies    No Known Allergies    Intolerances        HOME MEDICATIONS :   Home Medications:  losartan-hydroCHLOROthiazide 100 mg-12.5 mg oral tablet: 1 orally once a day (03 May 2023 03:40)      REVIEW OF SYSTEMS:    As above , all other systems are reviewed and are negative .     MEDICATIONS  (STANDING):  benzocaine 20% Spray 1 Spray(s) Topical once  sodium chloride 0.9%. 1000 milliLiter(s) (120 mL/Hr) IV Continuous <Continuous>    MEDICATIONS  (PRN):  acetaminophen     Tablet .. 650 milliGRAM(s) Oral every 6 hours PRN Temp greater or equal to 38C (100.4F), Mild Pain (1 - 3)      Vital Signs Last 24 Hrs  T(C): 36.8 (14 May 2023 14:21), Max: 36.8 (13 May 2023 16:50)  T(F): 98.2 (14 May 2023 14:21), Max: 98.2 (13 May 2023 16:50)  HR: 75 (14 May 2023 14:21) (75 - 84)  BP: 162/62 (14 May 2023 14:21) (133/70 - 169/67)  BP(mean): --  RR: 17 (14 May 2023 14:21) (17 - 18)  SpO2: 95% (14 May 2023 14:21) (93% - 97%)    Parameters below as of 14 May 2023 14:21  Patient On (Oxygen Delivery Method): room air    I&O's Detail    13 May 2023 07:01  -  14 May 2023 07:00  --------------------------------------------------------  IN:  Total IN: 0 mL    OUT:    Drain (mL): 675 mL    Nasogastric/Oral tube (mL): 700 mL  Total OUT: 1375 mL    Total NET: -1375 mL          PHYSICAL EXAM:    GENERAL: NAD, well-groomed, well-developed,   NGT +   HEAD:  Atraumatic, Normocephalic  EYES: EOMI, PERRLA, conjunctiva and sclera clear  NECK: Supple, No JVD, Normal thyroid  NERVOUS SYSTEM:  Alert & Oriented X4, no focal deficit   CHEST/LUNG: CTA  b/l,  no rales, rhonchi, wheezing, or rubs  HEART: Regular rate and rhythm; No murmurs, rubs, or gallops  ABDOMEN: Soft, Nontender, Nondistended; Bowel sounds present,   L sided drain +   EXTREMITIES:  2+ Peripheral Pulses, No clubbing, cyanosis, or edema ,   LYMPH: No lymphadenopathy noted  SKIN: No rashes or lesions    LABS:                        10.5   11.21 )-----------( 424      ( 14 May 2023 05:20 )             33.9     05-14    139  |  100  |  20.3<H>  ----------------------------<  68<L>  4.0   |  17.0<L>  |  0.78    Ca    8.6      14 May 2023 05:20  Phos  3.4     05-14  Mg     1.9     05-14    < from: 12 Lead ECG (05.13.23 @ 08:43) >    Ventricular Rate 84 BPM    Atrial Rate 84 BPM    P-R Interval 138 ms    QRS Duration 86 ms    Q-T Interval 390 ms    QTC Calculation(Bazett) 460 ms    P Axis 60 degrees    R Axis 72 degrees    T Axis 56 degrees    Diagnosis Line Normal sinus rhythm    < end of copied text >  TPro  6.1<L>  /  Alb  2.7<L>  /  TBili  1.8  /  DBili  x   /  AST  54<H>  /  ALT  75<H>  /  AlkPhos  147<H>  05-14    PT/INR - ( 14 May 2023 05:20 )   PT: 13.6 sec;   INR: 1.17 ratio         PTT - ( 14 May 2023 05:20 )  PTT:34.6 sec      RADIOLOGY & ADDITIONAL STUDIES:    < from: Xray Chest 1 View-PORTABLE IMMEDIATE (Xray Chest 1 View-PORTABLE IMMEDIATE .) (05.13.23 @ 01:02) >    ACC: [id number] EXAM:  XR CHEST PORTABLE IMMED 1V   ORDERED BY: MEGGAN BOTELLO     PROCEDURE DATE:  05/13/2023          INTERPRETATION:  INDICATIONS: 68-year-old female with NG tube placement.    Prior examination for comparison: None    Technique: AP view of chest    Findings:    Nasogastric tube is identified with the tip over the stomach. Left-sided   internal/external biliary catheter is present. The lungs are clear. There   are no pleural effusions. The cardiomediastinal silhouette is normal.   Bones are grossly normal.    IMPRESSION: NG tube in good position.    --- End of Report ---      < end of copied text >

## 2023-05-15 NOTE — ASU PREOP CHECKLIST - ISOLATION PRECAUTIONS
Arrived to the [facility]. Hydration completed. Patient tolerated without problems. Any issues or concerns during appointment: no.  Patient aware of next infusion appointment on 10/16/20 (date) at [address] [place] [address] (time). Discharged ambulatory.
Problem: Knowledge Deficit  Goal: *Verbalizes understanding of procedures and medications  Outcome: Progressing Towards Goal
none

## 2023-05-15 NOTE — CONSULT NOTE ADULT - CRITICAL CARE ATTENDING COMMENT
Patient seen and evaluated at bedside. Hypotensive, POCUS showed adequate filling in IVC, good cardiac function and contractility. started on low dose pressor, will challenge with small fluid bolus. Drains serosang. Monitor in ICU.

## 2023-05-15 NOTE — CHART NOTE - NSCHARTNOTEFT_GEN_A_CORE
POST-OP NOTE    ALLISON EPPERSON | [id number] | [facility] 3EST [id number]    Procedure: s/p pancreaticoduodenectomy and distal gastrectomy     Subjective: Patient seen and examined at bedside. On minimal levo as patient was hypotensive with MAP <60 on arrival to SICU postop. Voiding, pain well controlled. Denies chest pain, sob, nausea, vomiting or bowel function.       Vital Signs Last 24 Hrs  T(C): 37.1 (15 May 2023 06:27), Max: 37.1 (15 May 2023 06:27)  T(F): 98.8 (15 May 2023 06:27), Max: 98.8 (15 May 2023 06:27)  HR: 77 (15 May 2023 19:00) (76 - 106)  BP: 107/47 (15 May 2023 19:00) (82/51 - 185/68)  BP(mean): 66 (15 May 2023 19:00) (61 - 92)  RR: 12 (15 May 2023 19:00) (11 - 18)  SpO2: 95% (15 May 2023 19:00) (94% - 100%)    Parameters below as of 15 May 2023 16:00  Patient On (Oxygen Delivery Method): room air      I&O's Summary    14 May 2023 07:01  -  15 May 2023 07:00  --------------------------------------------------------  IN: 0 mL / OUT: 1775 mL / NET: -1775 mL    15 May 2023 07:01  -  15 May 2023 20:17  --------------------------------------------------------  IN: 1225 mL / OUT: 200 mL / NET: 1025 mL                            9.2    20.19 )-----------( 382      ( 15 May 2023 16:10 )             28.0     05-15    137  |  105  |  9.3  ----------------------------<  178<H>  4.2   |  20.0<L>  |  0.65    Ca    7.2<L>      15 May 2023 16:10  Phos  3.6     05-15  Mg     1.2     05-15    TPro  4.7<L>  /  Alb  2.7<L>  /  TBili  1.8  /  DBili  1.3<H>  /  AST  304<H>  /  ALT  327<H>  /  AlkPhos  94  05-15   PT/INR - ( 15 May 2023 16:10 )   PT: 15.1 sec;   INR: 1.30 ratio         PTT - ( 14 May 2023 05:20 )  PTT:34.6 sec    PHYSICAL EXAM:  Gen: NAD  Resp: breathing easily, no stridor  CV: RRR  Abdomen: soft, nontender, distended, R INDER and L INDER with serosanguinous output, previna holding suction   Skin: Incision c/d/i. Normal color, no rashes or lesions        Assessment:  68 year old women w/ pancreatic head mass recently admitted for malignant SBO and internal/extrenal biliary drain placement, admitted for PO intolerance and abdominal pain/distension, now s/p pancreaticoduodenectomy and distal gastrectomy. On minimal levo for hypotension       Plan:  whipple protocol  wean off pressors as able  care per SICU

## 2023-05-16 LAB
ALBUMIN SERPL ELPH-MCNC: 2.9 G/DL — LOW (ref 3.3–5.2)
ALP SERPL-CCNC: 72 U/L — SIGNIFICANT CHANGE UP (ref 40–120)
ALT FLD-CCNC: 366 U/L — HIGH
AMYLASE FLD-CCNC: 117 U/L — SIGNIFICANT CHANGE UP
AMYLASE FLD-CCNC: 119 U/L — SIGNIFICANT CHANGE UP
ANION GAP SERPL CALC-SCNC: 10 MMOL/L — SIGNIFICANT CHANGE UP (ref 5–17)
AST SERPL-CCNC: 327 U/L — HIGH
BASOPHILS # BLD AUTO: 0.01 K/UL — SIGNIFICANT CHANGE UP (ref 0–0.2)
BASOPHILS NFR BLD AUTO: 0.1 % — SIGNIFICANT CHANGE UP (ref 0–2)
BILIRUB SERPL-MCNC: 1.3 MG/DL — SIGNIFICANT CHANGE UP (ref 0.4–2)
BUN SERPL-MCNC: 7.7 MG/DL — LOW (ref 8–20)
CALCIUM SERPL-MCNC: 7.5 MG/DL — LOW (ref 8.4–10.5)
CHLORIDE SERPL-SCNC: 106 MMOL/L — SIGNIFICANT CHANGE UP (ref 96–108)
CO2 SERPL-SCNC: 22 MMOL/L — SIGNIFICANT CHANGE UP (ref 22–29)
CREAT SERPL-MCNC: 0.73 MG/DL — SIGNIFICANT CHANGE UP (ref 0.5–1.3)
EGFR: 89 ML/MIN/1.73M2 — SIGNIFICANT CHANGE UP
EOSINOPHIL # BLD AUTO: 0 K/UL — SIGNIFICANT CHANGE UP (ref 0–0.5)
EOSINOPHIL NFR BLD AUTO: 0 % — SIGNIFICANT CHANGE UP (ref 0–6)
GLUCOSE BLDC GLUCOMTR-MCNC: 124 MG/DL — HIGH (ref 70–99)
GLUCOSE BLDC GLUCOMTR-MCNC: 156 MG/DL — HIGH (ref 70–99)
GLUCOSE BLDC GLUCOMTR-MCNC: 190 MG/DL — HIGH (ref 70–99)
GLUCOSE SERPL-MCNC: 200 MG/DL — HIGH (ref 70–99)
GRAM STN FLD: SIGNIFICANT CHANGE UP
HCT VFR BLD CALC: 23.2 % — LOW (ref 34.5–45)
HCT VFR BLD CALC: 24.8 % — LOW (ref 34.5–45)
HGB BLD-MCNC: 7.8 G/DL — LOW (ref 11.5–15.5)
HGB BLD-MCNC: 8.3 G/DL — LOW (ref 11.5–15.5)
IMM GRANULOCYTES NFR BLD AUTO: 0.3 % — SIGNIFICANT CHANGE UP (ref 0–0.9)
LYMPHOCYTES # BLD AUTO: 1.18 K/UL — SIGNIFICANT CHANGE UP (ref 1–3.3)
LYMPHOCYTES # BLD AUTO: 8.8 % — LOW (ref 13–44)
MAGNESIUM SERPL-MCNC: 2.1 MG/DL — SIGNIFICANT CHANGE UP (ref 1.6–2.6)
MCHC RBC-ENTMCNC: 28.2 PG — SIGNIFICANT CHANGE UP (ref 27–34)
MCHC RBC-ENTMCNC: 33.6 GM/DL — SIGNIFICANT CHANGE UP (ref 32–36)
MCV RBC AUTO: 83.8 FL — SIGNIFICANT CHANGE UP (ref 80–100)
MONOCYTES # BLD AUTO: 0.56 K/UL — SIGNIFICANT CHANGE UP (ref 0–0.9)
MONOCYTES NFR BLD AUTO: 4.2 % — SIGNIFICANT CHANGE UP (ref 2–14)
MRSA PCR RESULT.: SIGNIFICANT CHANGE UP
MRSA PCR RESULT.: SIGNIFICANT CHANGE UP
NEUTROPHILS # BLD AUTO: 11.68 K/UL — HIGH (ref 1.8–7.4)
NEUTROPHILS NFR BLD AUTO: 86.6 % — HIGH (ref 43–77)
PHOSPHATE SERPL-MCNC: 3.2 MG/DL — SIGNIFICANT CHANGE UP (ref 2.4–4.7)
PLATELET # BLD AUTO: 270 K/UL — SIGNIFICANT CHANGE UP (ref 150–400)
POTASSIUM SERPL-MCNC: 3.9 MMOL/L — SIGNIFICANT CHANGE UP (ref 3.5–5.3)
POTASSIUM SERPL-SCNC: 3.9 MMOL/L — SIGNIFICANT CHANGE UP (ref 3.5–5.3)
PROT SERPL-MCNC: 4.5 G/DL — LOW (ref 6.6–8.7)
RBC # BLD: 2.77 M/UL — LOW (ref 3.8–5.2)
RBC # FLD: 14.8 % — HIGH (ref 10.3–14.5)
S AUREUS DNA NOSE QL NAA+PROBE: SIGNIFICANT CHANGE UP
S AUREUS DNA NOSE QL NAA+PROBE: SIGNIFICANT CHANGE UP
SODIUM SERPL-SCNC: 138 MMOL/L — SIGNIFICANT CHANGE UP (ref 135–145)
SPECIMEN SOURCE: SIGNIFICANT CHANGE UP
WBC # BLD: 13.47 K/UL — HIGH (ref 3.8–10.5)
WBC # FLD AUTO: 13.47 K/UL — HIGH (ref 3.8–10.5)

## 2023-05-16 PROCEDURE — 99233 SBSQ HOSP IP/OBS HIGH 50: CPT

## 2023-05-16 RX ORDER — DEXTROSE 50 % IN WATER 50 %
25 SYRINGE (ML) INTRAVENOUS ONCE
Refills: 0 | Status: DISCONTINUED | OUTPATIENT
Start: 2023-05-16 | End: 2023-05-16

## 2023-05-16 RX ORDER — GLUCAGON INJECTION, SOLUTION 0.5 MG/.1ML
1 INJECTION, SOLUTION SUBCUTANEOUS ONCE
Refills: 0 | Status: DISCONTINUED | OUTPATIENT
Start: 2023-05-16 | End: 2023-05-16

## 2023-05-16 RX ORDER — SODIUM CHLORIDE 9 MG/ML
1000 INJECTION, SOLUTION INTRAVENOUS
Refills: 0 | Status: DISCONTINUED | OUTPATIENT
Start: 2023-05-16 | End: 2023-05-16

## 2023-05-16 RX ORDER — DEXTROSE MONOHYDRATE, SODIUM CHLORIDE, AND POTASSIUM CHLORIDE 50; .745; 4.5 G/1000ML; G/1000ML; G/1000ML
1000 INJECTION, SOLUTION INTRAVENOUS
Refills: 0 | Status: DISCONTINUED | OUTPATIENT
Start: 2023-05-16 | End: 2023-05-18

## 2023-05-16 RX ORDER — ACETAMINOPHEN 500 MG
1000 TABLET ORAL EVERY 6 HOURS
Refills: 0 | Status: COMPLETED | OUTPATIENT
Start: 2023-05-16 | End: 2023-05-17

## 2023-05-16 RX ORDER — KETOROLAC TROMETHAMINE 30 MG/ML
15 SYRINGE (ML) INJECTION EVERY 6 HOURS
Refills: 0 | Status: DISCONTINUED | OUTPATIENT
Start: 2023-05-16 | End: 2023-05-19

## 2023-05-16 RX ORDER — INSULIN LISPRO 100/ML
VIAL (ML) SUBCUTANEOUS EVERY 6 HOURS
Refills: 0 | Status: DISCONTINUED | OUTPATIENT
Start: 2023-05-16 | End: 2023-05-17

## 2023-05-16 RX ORDER — HYDROMORPHONE HYDROCHLORIDE 2 MG/ML
0.5 INJECTION INTRAMUSCULAR; INTRAVENOUS; SUBCUTANEOUS EVERY 4 HOURS
Refills: 0 | Status: DISCONTINUED | OUTPATIENT
Start: 2023-05-16 | End: 2023-05-19

## 2023-05-16 RX ORDER — DEXTROSE 50 % IN WATER 50 %
15 SYRINGE (ML) INTRAVENOUS ONCE
Refills: 0 | Status: DISCONTINUED | OUTPATIENT
Start: 2023-05-16 | End: 2023-05-16

## 2023-05-16 RX ORDER — DEXTROSE 50 % IN WATER 50 %
12.5 SYRINGE (ML) INTRAVENOUS ONCE
Refills: 0 | Status: DISCONTINUED | OUTPATIENT
Start: 2023-05-16 | End: 2023-05-16

## 2023-05-16 RX ADMIN — Medication 15 MILLIGRAM(S): at 17:10

## 2023-05-16 RX ADMIN — Medication 15 MILLIGRAM(S): at 21:40

## 2023-05-16 RX ADMIN — Medication 15 MILLIGRAM(S): at 14:38

## 2023-05-16 RX ADMIN — Medication 10 MILLIGRAM(S): at 11:45

## 2023-05-16 RX ADMIN — Medication 2: at 06:55

## 2023-05-16 RX ADMIN — Medication 15 MILLIGRAM(S): at 21:55

## 2023-05-16 RX ADMIN — ENOXAPARIN SODIUM 40 MILLIGRAM(S): 100 INJECTION SUBCUTANEOUS at 17:10

## 2023-05-16 RX ADMIN — Medication 15 MILLIGRAM(S): at 18:10

## 2023-05-16 RX ADMIN — Medication 15 MILLIGRAM(S): at 13:38

## 2023-05-16 RX ADMIN — Medication 400 MILLIGRAM(S): at 10:03

## 2023-05-16 RX ADMIN — SODIUM CHLORIDE 100 MILLILITER(S): 9 INJECTION, SOLUTION INTRAVENOUS at 06:41

## 2023-05-16 RX ADMIN — PANTOPRAZOLE SODIUM 40 MILLIGRAM(S): 20 TABLET, DELAYED RELEASE ORAL at 11:45

## 2023-05-16 RX ADMIN — PIPERACILLIN AND TAZOBACTAM 25 GRAM(S): 4; .5 INJECTION, POWDER, LYOPHILIZED, FOR SOLUTION INTRAVENOUS at 13:41

## 2023-05-16 RX ADMIN — DEXTROSE MONOHYDRATE, SODIUM CHLORIDE, AND POTASSIUM CHLORIDE 84 MILLILITER(S): 50; .745; 4.5 INJECTION, SOLUTION INTRAVENOUS at 15:31

## 2023-05-16 RX ADMIN — PIPERACILLIN AND TAZOBACTAM 25 GRAM(S): 4; .5 INJECTION, POWDER, LYOPHILIZED, FOR SOLUTION INTRAVENOUS at 21:36

## 2023-05-16 RX ADMIN — Medication 1000 MILLIGRAM(S): at 11:03

## 2023-05-16 RX ADMIN — Medication 400 MILLIGRAM(S): at 05:32

## 2023-05-16 RX ADMIN — SODIUM CHLORIDE 100 MILLILITER(S): 9 INJECTION, SOLUTION INTRAVENOUS at 00:28

## 2023-05-16 RX ADMIN — Medication 1000 MILLIGRAM(S): at 05:47

## 2023-05-16 RX ADMIN — DEXTROSE MONOHYDRATE, SODIUM CHLORIDE, AND POTASSIUM CHLORIDE 84 MILLILITER(S): 50; .745; 4.5 INJECTION, SOLUTION INTRAVENOUS at 21:36

## 2023-05-16 RX ADMIN — Medication 1000 MILLIGRAM(S): at 18:10

## 2023-05-16 RX ADMIN — Medication 400 MILLIGRAM(S): at 17:10

## 2023-05-16 RX ADMIN — Medication 2: at 18:04

## 2023-05-16 RX ADMIN — CHLORHEXIDINE GLUCONATE 1 APPLICATION(S): 213 SOLUTION TOPICAL at 11:46

## 2023-05-16 RX ADMIN — PIPERACILLIN AND TAZOBACTAM 25 GRAM(S): 4; .5 INJECTION, POWDER, LYOPHILIZED, FOR SOLUTION INTRAVENOUS at 05:31

## 2023-05-16 NOTE — OPERATIVE REPORT - OPERATIVE RPOSRT DETAILS
The patient was brought to the operating room and placed on the operating room table in the supine position with both arms out on arm boards.  His abdomen was prepped and draped in the usual sterile fashion and a time-out procedure was performed in conjunction with  the nursing staff and the anesthesiologist.  The patient received preoperative antibiotic prophylaxis, as well as DVT prophylaxis in the form of bilateral pneumatic compression devices and 5000 units of subcutaneous heparin.    An supraumbilical incision was made with a 10-blade scalpel and carried down through the dermis into the subcutaneous tissue. The fascia was scored with electrocautery and the incision enlarged up to xiphoid and down slightly below umbilicus.      Our attention was then turned to entering the lesser sac.  The gastrocolic ligament was divided very close to the antrum of the stomach with a Harmonic.  This allowed us to enter the lesser sac.  The superior border of the stomach and duodenum was also cleared with the Ligasure.  The gastrohepatic ligament was divided and dissection  was carried out distally.  The right gastric artery was carefully encircled, clipped and then divided with the Ligasure.  The stomach was then transected at the incisura with several firings of the Endo OREN 60-mm purple and black staple loads.  The stomach was then packed in the left upper quadrant.    Our attention was then turned to the inferior border of the pancreas.  The inferior border of the  pancreas was dissected free carefully using Ligasure and electrocautery.  There was some inflammation in the area.  However, the superior mesenteric vein was identified and a tunnel was created inferiorly.  The superior border of the  pancreas was then dissected free, which allowed us to identify the common hepatic artery.  At the genu of the common hepatic artery, the gastroduodenal artery was then encircled at the genu and test clamped and then stapled with endoGI 45 tan.  The bile duct was then dissected off the proper hepatic artery.  We identified all the vascular and biliary anatomy in the portillo.  The gallbladder  was then taken down.  The critical view was obtained.  The gallbladder was freed up off the liver and the cystic duct was identified.  The cystic artery was identified.  There were no other structures in the Calot's triangle.  This was doubly clipped  and ligated and the cystic duct was brought down for later transection.  The bile duct was encircled with yellow vessel loop.     Next, our attention was turned to performing a full Kocher maneuver.  This was after the pancreas was divided.  The pancreas was divided at the neck of the pancreas with electrocautery.  Any bleeding points were controlled with hot chance and the pancreatic duct  was identified in its usual anatomic location posterior and one-third of the way down from the superior border of the pancreas.    Our attention was then turned to performing a Kocher maneuver.  The duodenum was retracted medially.   The full Kocher maneuver was performed until up to the left renal vein.  This was extremely challening given the amount of lymphadenopathy and scarring in this area. The duodenum was mobilized to the fourth portion.  The proximal jejunum was then identified, stapled with 60 mm purple staple load and the mesentery was divided with Ligasure just at the mesenteric border until it was able to be passed into the right upper quadrant. Careful dissection was carried out along the mesenteric border of the bowel making sure not to injure the SMV  or SMA.  The SMV was then dissected free at its lateral border and inferior pancreaticoduodenal vein was carefully identified and dissected out and doubly clipped and ligated with silk ties.  The pancreas was then freed up.  The uncinate process was freed up from its connections  to the retroperitoneum with a combination of Ligasure and suture ligasures.  There was dense fibrosis and inflammation in this area and the IPDA had to be ligated with 4-0 prolene. The small branches coming off the portal vein were taken with clips and ties and the Ligasure.  A small branch coming off the portal vein and superior border was also  controlled.  Finally, the attachment to the left side of the portal vein were taken down with the ligasure.   Finally, the only attachments were to the common bile duct.  The common bile duct was transected and the bile duct internal external drain was removed.  The bile was cultured for  bacteria and sent off for testing.  The bile duct was explored and irrigated to get rid of debris. The specimen was then disconnected from all its attachments and sent off for permanent pathology. Several additional pathologic appearing lymph nodes were dissected free from the posterior portal area and the SMA area.  These were sent separately. The area was copiously irrigated with normal saline and the hemostasis was achieved.    Our attention was then turned to the reconstruction of the right upper  quadrant.  The proximal jejunum was brought up in a candy cane position.  The was through a defect to the right of the middle colic vessels.  First, our attention was turned to the pancreaticojejunostomy.  This was performed in two layers of running 3-0 V-Loc in the posterior layer,  an enterotomy was made in the bowel, an interrupted 5-0 PDS over an 8-Yakut pediatric feeding tube was performed in a duct-to-mucosa fashion, and the anterior layer was run with 3-0 V-Loc suture.     Our attention was then turned to the hepaticojejunostomy.  This was performed in a single layer with 4-0 vLoc in an running fashion. The gastrojejunostomy was created at the distal end of the greater curvature of the stomach.  Stay stitch was placed with 3-0 silk sutures.   An Endo OREN 60-mm purple staple load was used to create a common gastrojejunostomy.  The common enterotomy was then closed in one layer with running 3-0 V-Loc suture.  The staple line of the gastrectomy was oversewn with running U stitch V-loc 3-0.     Our attention was then turned back to the hepaticojejunostomy in the right upper quadrant.  A Ray-Bekah was placed into this area and there were no signs of bile leak in the right upper quadrant.    Once again, the area was copiously irrigated  with normal saline and hemostasis was achieved.  The falciform ligament was then fashioned into an intra-abdominal omental flap and placed over the hepaticojejunostomy after the Domingo drains were placed into position.  The 19-Yakut Blakes were placed in; one in the right posterior right side coming and posteriorly to hepatico and pancreaticojejunostomy on the left side going anterior to those anastomoses, and finally, the TAP block was performed.  The jejunum was tacked up to the abdominal wall in 4 quadrants with 3-0 silk and metal clips in the rare instance a percutaneous J tube needed to be placed. The midline was then closed with running loop #1 PDS.  The dermis was closed with interrupted 3-0 Vicryl sutures. Skin was closed with 4-0 Monocryls.  Incisional wound vac was applied.  All instrument and sponge counts were correct at the end of the case.    The patient was awoken from anesthesia and transferred to the SICU in stable condition.

## 2023-05-16 NOTE — DIETITIAN INITIAL EVALUATION ADULT - ORAL INTAKE PTA/DIET HISTORY
Pt sleeping during visit; NG in place to suction. Strict NPO at this time noted. Weight history obtained from previous assessment. Pt with 34lb weight loss over past  month.

## 2023-05-16 NOTE — DIETITIAN INITIAL EVALUATION ADULT - PERTINENT LABORATORY DATA
05-16    138  |  106  |  7.7<L>  ----------------------------<  200<H>  3.9   |  22.0  |  0.73    Ca    7.5<L>      16 May 2023 03:30  Phos  3.2     05-16  Mg     2.1     05-16    TPro  4.5<L>  /  Alb  2.9<L>  /  TBili  1.3  /  DBili  x   /  AST  327<H>  /  ALT  366<H>  /  AlkPhos  72  05-16  POCT Blood Glucose.: 190 mg/dL (05-16-23 @ 06:30)

## 2023-05-16 NOTE — DIETITIAN INITIAL EVALUATION ADULT - NSFNSGIIOFT_GEN_A_CORE
05-15-23 @ 07:01  -  05-16-23 @ 07:00  --------------------------------------------------------  OUT:    Nasogastric/Oral tube (mL): 100 mL  Total OUT: 100 mL    Total NET: -100 mL

## 2023-05-16 NOTE — DIETITIAN INITIAL EVALUATION ADULT - OTHER INFO
Pt is a 68 year old f with PMH of metastatic adenocarcinoma complicated by a malignant SBO and obstructive jaundice with internal/ external drain placement by IR. She was discharged with plan to improve her nutritional status and return for a Whipple. She returned on 5/12 with 2 days of PO intolerance and abdominal pain/distension. Pt was taken for a Whipple procedure  Pt is a 68 year old f with PMH of metastatic adenocarcinoma complicated by a malignant SBO and obstructive jaundice with internal/ external drain placement by IR. She was discharged with plan to improve her nutritional status and return for a Whipple. She returned on 5/12 with 2 days of PO intolerance and abdominal pain/distension. Pt was taken for a Whipple procedure

## 2023-05-16 NOTE — DIETITIAN INITIAL EVALUATION ADULT - PERTINENT MEDS FT
MEDICATIONS  (STANDING):  bisacodyl Suppository 10 milliGRAM(s) Rectal daily  chlorhexidine 2% Cloths 1 Application(s) Topical daily  dextrose 50% Injectable 12.5 Gram(s) IV Push once  dextrose 50% Injectable 25 Gram(s) IV Push once  dextrose 50% Injectable 25 Gram(s) IV Push once  enoxaparin Injectable 40 milliGRAM(s) SubCutaneous every 24 hours  glucagon  Injectable 1 milliGRAM(s) IntraMuscular once  insulin lispro (ADMELOG) corrective regimen sliding scale   SubCutaneous every 6 hours  lactated ringers. 1000 milliLiter(s) (100 mL/Hr) IV Continuous <Continuous>  norepinephrine Infusion 0.02 MICROgram(s)/kG/Min (2.07 mL/Hr) IV Continuous <Continuous>  pantoprazole  Injectable 40 milliGRAM(s) IV Push daily  piperacillin/tazobactam IVPB.. 3.375 Gram(s) IV Intermittent every 8 hours    MEDICATIONS  (PRN):  acetaminophen   IVPB .. 1000 milliGRAM(s) IV Intermittent every 6 hours PRN Mild Pain (1 - 3), Moderate Pain (4 - 6), Severe Pain (7 - 10)  dextrose Oral Gel 15 Gram(s) Oral once PRN Blood Glucose LESS THAN 70 milliGRAM(s)/deciliter

## 2023-05-16 NOTE — DIETITIAN INITIAL EVALUATION ADULT - CALCULATED TO (ML/KG)
Josefina Ulrich called to inform Dr Shorty Alcantar at his request that her GI Specialist states that her hemoglobin 6.9 therefore he is holding her Plavix but she is still taking ASA. Josefina Ulrich states that the patient is very weak. They feel that she is having another episode of of poss Gi bleed. The patient was instructed to go to the er is 's [facility] for evaluation.
[id number]

## 2023-05-16 NOTE — DIETITIAN INITIAL EVALUATION ADULT - ETIOLOGY
Related to inability to meet sufficient protein energy needs w/ altered GI function in setting of metastatic adenocarcinoma, malignant SBO

## 2023-05-17 LAB
A1C WITH ESTIMATED AVERAGE GLUCOSE RESULT: 6 % — HIGH (ref 4–5.6)
ALBUMIN SERPL ELPH-MCNC: 2.5 G/DL — LOW (ref 3.3–5.2)
ALP SERPL-CCNC: 77 U/L — SIGNIFICANT CHANGE UP (ref 40–120)
ALT FLD-CCNC: 811 U/L — HIGH
AMYLASE FLD-CCNC: 33 U/L — SIGNIFICANT CHANGE UP
AMYLASE FLD-CCNC: 35 U/L — SIGNIFICANT CHANGE UP
ANION GAP SERPL CALC-SCNC: 11 MMOL/L — SIGNIFICANT CHANGE UP (ref 5–17)
AST SERPL-CCNC: 557 U/L — HIGH
BASOPHILS # BLD AUTO: 0.02 K/UL — SIGNIFICANT CHANGE UP (ref 0–0.2)
BASOPHILS NFR BLD AUTO: 0.2 % — SIGNIFICANT CHANGE UP (ref 0–2)
BILIRUB SERPL-MCNC: 1.2 MG/DL — SIGNIFICANT CHANGE UP (ref 0.4–2)
BUN SERPL-MCNC: 6.5 MG/DL — LOW (ref 8–20)
CALCIUM SERPL-MCNC: 7.4 MG/DL — LOW (ref 8.4–10.5)
CHLORIDE SERPL-SCNC: 110 MMOL/L — HIGH (ref 96–108)
CO2 SERPL-SCNC: 21 MMOL/L — LOW (ref 22–29)
CREAT SERPL-MCNC: 0.86 MG/DL — SIGNIFICANT CHANGE UP (ref 0.5–1.3)
EGFR: 73 ML/MIN/1.73M2 — SIGNIFICANT CHANGE UP
EOSINOPHIL # BLD AUTO: 0.08 K/UL — SIGNIFICANT CHANGE UP (ref 0–0.5)
EOSINOPHIL NFR BLD AUTO: 0.8 % — SIGNIFICANT CHANGE UP (ref 0–6)
ESTIMATED AVERAGE GLUCOSE: 126 MG/DL — HIGH (ref 68–114)
GLUCOSE BLDC GLUCOMTR-MCNC: 127 MG/DL — HIGH (ref 70–99)
GLUCOSE BLDC GLUCOMTR-MCNC: 129 MG/DL — HIGH (ref 70–99)
GLUCOSE BLDC GLUCOMTR-MCNC: 142 MG/DL — HIGH (ref 70–99)
GLUCOSE BLDC GLUCOMTR-MCNC: 148 MG/DL — HIGH (ref 70–99)
GLUCOSE SERPL-MCNC: 123 MG/DL — HIGH (ref 70–99)
HCT VFR BLD CALC: 23.2 % — LOW (ref 34.5–45)
HGB BLD-MCNC: 7.6 G/DL — LOW (ref 11.5–15.5)
IMM GRANULOCYTES NFR BLD AUTO: 0.7 % — SIGNIFICANT CHANGE UP (ref 0–0.9)
LYMPHOCYTES # BLD AUTO: 1.22 K/UL — SIGNIFICANT CHANGE UP (ref 1–3.3)
LYMPHOCYTES # BLD AUTO: 11.8 % — LOW (ref 13–44)
MAGNESIUM SERPL-MCNC: 1.8 MG/DL — SIGNIFICANT CHANGE UP (ref 1.6–2.6)
MCHC RBC-ENTMCNC: 27.8 PG — SIGNIFICANT CHANGE UP (ref 27–34)
MCHC RBC-ENTMCNC: 32.8 GM/DL — SIGNIFICANT CHANGE UP (ref 32–36)
MCV RBC AUTO: 85 FL — SIGNIFICANT CHANGE UP (ref 80–100)
MONOCYTES # BLD AUTO: 0.44 K/UL — SIGNIFICANT CHANGE UP (ref 0–0.9)
MONOCYTES NFR BLD AUTO: 4.3 % — SIGNIFICANT CHANGE UP (ref 2–14)
NEUTROPHILS # BLD AUTO: 8.48 K/UL — HIGH (ref 1.8–7.4)
NEUTROPHILS NFR BLD AUTO: 82.2 % — HIGH (ref 43–77)
PHOSPHATE SERPL-MCNC: 1.7 MG/DL — LOW (ref 2.4–4.7)
PLATELET # BLD AUTO: 249 K/UL — SIGNIFICANT CHANGE UP (ref 150–400)
POTASSIUM SERPL-MCNC: 3.6 MMOL/L — SIGNIFICANT CHANGE UP (ref 3.5–5.3)
POTASSIUM SERPL-SCNC: 3.6 MMOL/L — SIGNIFICANT CHANGE UP (ref 3.5–5.3)
PROT SERPL-MCNC: 4.6 G/DL — LOW (ref 6.6–8.7)
RBC # BLD: 2.73 M/UL — LOW (ref 3.8–5.2)
RBC # FLD: 15.3 % — HIGH (ref 10.3–14.5)
SODIUM SERPL-SCNC: 142 MMOL/L — SIGNIFICANT CHANGE UP (ref 135–145)
WBC # BLD: 10.31 K/UL — SIGNIFICANT CHANGE UP (ref 3.8–10.5)
WBC # FLD AUTO: 10.31 K/UL — SIGNIFICANT CHANGE UP (ref 3.8–10.5)

## 2023-05-17 PROCEDURE — 99233 SBSQ HOSP IP/OBS HIGH 50: CPT

## 2023-05-17 RX ORDER — MAGNESIUM SULFATE 500 MG/ML
2 VIAL (ML) INJECTION ONCE
Refills: 0 | Status: COMPLETED | OUTPATIENT
Start: 2023-05-17 | End: 2023-05-17

## 2023-05-17 RX ORDER — POTASSIUM PHOSPHATE, MONOBASIC POTASSIUM PHOSPHATE, DIBASIC 236; 224 MG/ML; MG/ML
30 INJECTION, SOLUTION INTRAVENOUS ONCE
Refills: 0 | Status: COMPLETED | OUTPATIENT
Start: 2023-05-17 | End: 2023-05-17

## 2023-05-17 RX ORDER — HYDRALAZINE HCL 50 MG
10 TABLET ORAL ONCE
Refills: 0 | Status: COMPLETED | OUTPATIENT
Start: 2023-05-17 | End: 2023-05-17

## 2023-05-17 RX ORDER — INSULIN LISPRO 100/ML
VIAL (ML) SUBCUTANEOUS
Refills: 0 | Status: DISCONTINUED | OUTPATIENT
Start: 2023-05-17 | End: 2023-05-19

## 2023-05-17 RX ADMIN — Medication 15 MILLIGRAM(S): at 17:35

## 2023-05-17 RX ADMIN — Medication 15 MILLIGRAM(S): at 12:07

## 2023-05-17 RX ADMIN — Medication 15 MILLIGRAM(S): at 05:41

## 2023-05-17 RX ADMIN — Medication 1000 MILLIGRAM(S): at 00:51

## 2023-05-17 RX ADMIN — Medication 1000 MILLIGRAM(S): at 06:11

## 2023-05-17 RX ADMIN — Medication 25 GRAM(S): at 12:07

## 2023-05-17 RX ADMIN — Medication 400 MILLIGRAM(S): at 05:41

## 2023-05-17 RX ADMIN — PIPERACILLIN AND TAZOBACTAM 25 GRAM(S): 4; .5 INJECTION, POWDER, LYOPHILIZED, FOR SOLUTION INTRAVENOUS at 15:34

## 2023-05-17 RX ADMIN — Medication 400 MILLIGRAM(S): at 00:20

## 2023-05-17 RX ADMIN — POTASSIUM PHOSPHATE, MONOBASIC POTASSIUM PHOSPHATE, DIBASIC 83.33 MILLIMOLE(S): 236; 224 INJECTION, SOLUTION INTRAVENOUS at 05:41

## 2023-05-17 RX ADMIN — Medication 15 MILLIGRAM(S): at 00:20

## 2023-05-17 RX ADMIN — PIPERACILLIN AND TAZOBACTAM 25 GRAM(S): 4; .5 INJECTION, POWDER, LYOPHILIZED, FOR SOLUTION INTRAVENOUS at 21:21

## 2023-05-17 RX ADMIN — Medication 1000 MILLIGRAM(S): at 13:10

## 2023-05-17 RX ADMIN — Medication 10 MILLIGRAM(S): at 20:58

## 2023-05-17 RX ADMIN — ENOXAPARIN SODIUM 40 MILLIGRAM(S): 100 INJECTION SUBCUTANEOUS at 17:35

## 2023-05-17 RX ADMIN — PANTOPRAZOLE SODIUM 40 MILLIGRAM(S): 20 TABLET, DELAYED RELEASE ORAL at 12:06

## 2023-05-17 RX ADMIN — Medication 15 MILLIGRAM(S): at 06:00

## 2023-05-17 RX ADMIN — Medication 15 MILLIGRAM(S): at 00:35

## 2023-05-17 RX ADMIN — PIPERACILLIN AND TAZOBACTAM 25 GRAM(S): 4; .5 INJECTION, POWDER, LYOPHILIZED, FOR SOLUTION INTRAVENOUS at 05:40

## 2023-05-17 RX ADMIN — Medication 400 MILLIGRAM(S): at 12:07

## 2023-05-17 RX ADMIN — Medication 15 MILLIGRAM(S): at 18:19

## 2023-05-17 RX ADMIN — Medication 15 MILLIGRAM(S): at 13:11

## 2023-05-17 NOTE — CHART NOTE - NSCHARTNOTEFT_GEN_A_CORE
SICU TRANSFER NOTE  -----------------------------  ICU Admission Date: 5/15/23  Transfer Date: 05-17-23 @ 14:25    Admission Diagnosis: metastatic adenocarcinoma, malignant SBO/obstructive jaundice with planned Whipple    Active Problems/injuries: S/p Pancreaticoduodenectomy with distal gastrectomy. metastatic adenocarcinoma    Procedures: Pancreaticoduodenectomy with distal gastrectomy 5/15    Consultants:  [ ] Cardiology  [ ] Endocrine  [ ] Infectious Disease  [X] Medicine  [ ]Neurosurgery  [ ] Ortho       [ ] Weight Bearing Status:  [ ] Palliative       [ ] Advanced Directives:    [ ] Physical Medicine and Rehab       [ ] Disposition :   [ ] Plastics  [ ] Pulmonary    Medications  bisacodyl Suppository 10 milliGRAM(s) Rectal daily  dextrose 5% + sodium chloride 0.45% with potassium chloride 20 mEq/L 1000 milliLiter(s) IV Continuous <Continuous>  enoxaparin Injectable 40 milliGRAM(s) SubCutaneous every 24 hours  HYDROmorphone  Injectable 0.5 milliGRAM(s) IV Push every 4 hours PRN  insulin lispro (ADMELOG) corrective regimen sliding scale   SubCutaneous three times a day before meals  ketorolac   Injectable 15 milliGRAM(s) IV Push every 6 hours  ketorolac   Injectable 15 milliGRAM(s) IV Push every 6 hours PRN  pantoprazole  Injectable 40 milliGRAM(s) IV Push daily  piperacillin/tazobactam IVPB.. 3.375 Gram(s) IV Intermittent every 8 hours      [X] I attest I have reviewed and reconciled all medications prior to transfer    IV Fluids  dextrose 5% + sodium chloride 0.45% with potassium chloride 20 mEq/L: Solution, 1000 milliLiter(s) infuse at 84 mL/Hr  lactated ringers.: Solution, 1000 milliLiter(s) infuse at 100 mL/Hr  Provider's Contact #: [phone]  dextrose 5% + sodium chloride 0.45%.: Solution, 1000 milliLiter(s) infuse at 75 mL/Hr  Provider's Contact #: [phone]  sodium chloride 0.9% Bolus:   1000 milliLiter(s), IV Bolus, once, infuse over 1 Hr, Stop After 1 Doses  Provider's Contact #: [phone]  dextrose 5% + sodium chloride 0.45%.: Solution, 1000 milliLiter(s) infuse at 100 mL/Hr  sodium chloride 0.9%.: Solution, 1000 milliLiter(s) infuse at 120 mL/Hr  sodium chloride 0.9% Bolus:   1000 milliLiter(s), IV Bolus, once, infuse over 1 Hour(s), Stop After 1 Doses    Indication: Whipple pathway    Antibiotics:  piperacillin/tazobactam IVPB.. 3.375 Gram(s) IV Intermittent every 8 hours    Indication: whipple pathway End Date: 5/22/23      I have discussed this case with upon transfer and all questions regarding ICU course were answered.    The following items are to be followed up:  -Continue to follow whipple pathway  -F/U amylase from b/l INDER drain   -Maintain b/l INDER drains  -Tolerating CLD  -+BM 5/17/Voiding  -c/w Zosyn x7 days total  -CW IVF  -Pain control PRN  -Dulcolax/PPI

## 2023-05-17 NOTE — PROGRESS NOTE ADULT - NS ATTEND OPT1 GEN_ALL_CORE
I independently performed the documented:
Oriented - self; Oriented - place; Oriented - time
I independently performed the documented:

## 2023-05-17 NOTE — PROGRESS NOTE ADULT - NS ATTEND AMEND GEN_ALL_CORE FT
Patient seen and examined on am rounds. Abd soft, doing well, tolerating clears, stable for tx out of ICU today. Discussed with primayr team (Dr. Rebolledo)
Pt seen and examined on am rounds. JPs serosang, abd soft, appropriately TTP. Pt feels much improved, weaned off pressors, received albumin bolus as fluid challenge, responded appropriately. NG removed by primary team, will plan to dc espinoza and a line today. Anticipate tx out of ICU tomorrow. OOB today.

## 2023-05-17 NOTE — HISTORY OF PRESENT ILLNESS
[de-identified] : 68 year-old female presents for an initial postop visit.\par \par status post exploratory laparotomy, Whipple and distal gastrectomy on 5/15/23

## 2023-05-17 NOTE — REASON FOR VISIT
[Post-Op] : a post-op for [FreeTextEntry2] : status post exploratory laparotomy, Whipple and distal gastrectomy on 5/15/23

## 2023-05-18 ENCOUNTER — APPOINTMENT (OUTPATIENT)
Dept: SURGICAL ONCOLOGY | Facility: CLINIC | Age: 68
End: 2023-05-18

## 2023-05-18 LAB
-  AMPICILLIN: SIGNIFICANT CHANGE UP
-  TETRACYCLINE: SIGNIFICANT CHANGE UP
-  VANCOMYCIN: SIGNIFICANT CHANGE UP
AMYLASE FLD-CCNC: 26 U/L — SIGNIFICANT CHANGE UP
ANION GAP SERPL CALC-SCNC: 11 MMOL/L — SIGNIFICANT CHANGE UP (ref 5–17)
BASOPHILS # BLD AUTO: 0.03 K/UL — SIGNIFICANT CHANGE UP (ref 0–0.2)
BASOPHILS NFR BLD AUTO: 0.4 % — SIGNIFICANT CHANGE UP (ref 0–2)
BUN SERPL-MCNC: 6.5 MG/DL — LOW (ref 8–20)
CALCIUM SERPL-MCNC: 7.2 MG/DL — LOW (ref 8.4–10.5)
CHLORIDE SERPL-SCNC: 111 MMOL/L — HIGH (ref 96–108)
CO2 SERPL-SCNC: 20 MMOL/L — LOW (ref 22–29)
CREAT SERPL-MCNC: 0.75 MG/DL — SIGNIFICANT CHANGE UP (ref 0.5–1.3)
EGFR: 86 ML/MIN/1.73M2 — SIGNIFICANT CHANGE UP
EOSINOPHIL # BLD AUTO: 0.2 K/UL — SIGNIFICANT CHANGE UP (ref 0–0.5)
EOSINOPHIL NFR BLD AUTO: 2.6 % — SIGNIFICANT CHANGE UP (ref 0–6)
GLUCOSE BLDC GLUCOMTR-MCNC: 102 MG/DL — HIGH (ref 70–99)
GLUCOSE BLDC GLUCOMTR-MCNC: 110 MG/DL — HIGH (ref 70–99)
GLUCOSE BLDC GLUCOMTR-MCNC: 125 MG/DL — HIGH (ref 70–99)
GLUCOSE BLDC GLUCOMTR-MCNC: 98 MG/DL — SIGNIFICANT CHANGE UP (ref 70–99)
GLUCOSE SERPL-MCNC: 114 MG/DL — HIGH (ref 70–99)
HCT VFR BLD CALC: 23.6 % — LOW (ref 34.5–45)
HGB BLD-MCNC: 7.5 G/DL — LOW (ref 11.5–15.5)
IMM GRANULOCYTES NFR BLD AUTO: 0.5 % — SIGNIFICANT CHANGE UP (ref 0–0.9)
LYMPHOCYTES # BLD AUTO: 1.17 K/UL — SIGNIFICANT CHANGE UP (ref 1–3.3)
LYMPHOCYTES # BLD AUTO: 15.4 % — SIGNIFICANT CHANGE UP (ref 13–44)
MAGNESIUM SERPL-MCNC: 2.1 MG/DL — SIGNIFICANT CHANGE UP (ref 1.6–2.6)
MCHC RBC-ENTMCNC: 27.9 PG — SIGNIFICANT CHANGE UP (ref 27–34)
MCHC RBC-ENTMCNC: 31.8 GM/DL — LOW (ref 32–36)
MCV RBC AUTO: 87.7 FL — SIGNIFICANT CHANGE UP (ref 80–100)
METHOD TYPE: SIGNIFICANT CHANGE UP
MONOCYTES # BLD AUTO: 0.31 K/UL — SIGNIFICANT CHANGE UP (ref 0–0.9)
MONOCYTES NFR BLD AUTO: 4.1 % — SIGNIFICANT CHANGE UP (ref 2–14)
NEUTROPHILS # BLD AUTO: 5.86 K/UL — SIGNIFICANT CHANGE UP (ref 1.8–7.4)
NEUTROPHILS NFR BLD AUTO: 77 % — SIGNIFICANT CHANGE UP (ref 43–77)
PHOSPHATE SERPL-MCNC: 2.2 MG/DL — LOW (ref 2.4–4.7)
PLATELET # BLD AUTO: 297 K/UL — SIGNIFICANT CHANGE UP (ref 150–400)
POTASSIUM SERPL-MCNC: 4.1 MMOL/L — SIGNIFICANT CHANGE UP (ref 3.5–5.3)
POTASSIUM SERPL-SCNC: 4.1 MMOL/L — SIGNIFICANT CHANGE UP (ref 3.5–5.3)
RBC # BLD: 2.69 M/UL — LOW (ref 3.8–5.2)
RBC # FLD: 15.6 % — HIGH (ref 10.3–14.5)
SODIUM SERPL-SCNC: 142 MMOL/L — SIGNIFICANT CHANGE UP (ref 135–145)
WBC # BLD: 7.61 K/UL — SIGNIFICANT CHANGE UP (ref 3.8–10.5)
WBC # FLD AUTO: 7.61 K/UL — SIGNIFICANT CHANGE UP (ref 3.8–10.5)

## 2023-05-18 RX ORDER — LOSARTAN POTASSIUM 100 MG/1
100 TABLET, FILM COATED ORAL DAILY
Refills: 0 | Status: DISCONTINUED | OUTPATIENT
Start: 2023-05-18 | End: 2023-05-19

## 2023-05-18 RX ORDER — ACETAMINOPHEN 500 MG
650 TABLET ORAL ONCE
Refills: 0 | Status: COMPLETED | OUTPATIENT
Start: 2023-05-18 | End: 2023-05-18

## 2023-05-18 RX ORDER — HYDRALAZINE HCL 50 MG
10 TABLET ORAL ONCE
Refills: 0 | Status: DISCONTINUED | OUTPATIENT
Start: 2023-05-18 | End: 2023-05-18

## 2023-05-18 RX ORDER — HYDRALAZINE HCL 50 MG
10 TABLET ORAL ONCE
Refills: 0 | Status: COMPLETED | OUTPATIENT
Start: 2023-05-18 | End: 2023-05-18

## 2023-05-18 RX ADMIN — Medication 15 MILLIGRAM(S): at 00:03

## 2023-05-18 RX ADMIN — Medication 650 MILLIGRAM(S): at 09:42

## 2023-05-18 RX ADMIN — Medication 15 MILLIGRAM(S): at 17:34

## 2023-05-18 RX ADMIN — Medication 15 MILLIGRAM(S): at 17:15

## 2023-05-18 RX ADMIN — Medication 15 MILLIGRAM(S): at 05:31

## 2023-05-18 RX ADMIN — Medication 15 MILLIGRAM(S): at 13:59

## 2023-05-18 RX ADMIN — PIPERACILLIN AND TAZOBACTAM 25 GRAM(S): 4; .5 INJECTION, POWDER, LYOPHILIZED, FOR SOLUTION INTRAVENOUS at 05:31

## 2023-05-18 RX ADMIN — Medication 85 MILLIMOLE(S): at 14:49

## 2023-05-18 RX ADMIN — PANTOPRAZOLE SODIUM 40 MILLIGRAM(S): 20 TABLET, DELAYED RELEASE ORAL at 13:59

## 2023-05-18 RX ADMIN — Medication 15 MILLIGRAM(S): at 14:18

## 2023-05-18 RX ADMIN — Medication 10 MILLIGRAM(S): at 20:06

## 2023-05-18 RX ADMIN — LOSARTAN POTASSIUM 100 MILLIGRAM(S): 100 TABLET, FILM COATED ORAL at 09:51

## 2023-05-18 RX ADMIN — ENOXAPARIN SODIUM 40 MILLIGRAM(S): 100 INJECTION SUBCUTANEOUS at 17:15

## 2023-05-18 RX ADMIN — Medication 15 MILLIGRAM(S): at 01:03

## 2023-05-18 RX ADMIN — DEXTROSE MONOHYDRATE, SODIUM CHLORIDE, AND POTASSIUM CHLORIDE 84 MILLILITER(S): 50; .745; 4.5 INJECTION, SOLUTION INTRAVENOUS at 07:01

## 2023-05-18 RX ADMIN — Medication 650 MILLIGRAM(S): at 08:25

## 2023-05-19 ENCOUNTER — TRANSCRIPTION ENCOUNTER (OUTPATIENT)
Age: 68
End: 2023-05-19

## 2023-05-19 VITALS
DIASTOLIC BLOOD PRESSURE: 70 MMHG | OXYGEN SATURATION: 98 % | HEART RATE: 80 BPM | SYSTOLIC BLOOD PRESSURE: 166 MMHG | TEMPERATURE: 98 F | RESPIRATION RATE: 17 BRPM

## 2023-05-19 LAB
-  FLUCONAZOLE: SIGNIFICANT CHANGE UP
AMYLASE FLD-CCNC: 24 U/L — SIGNIFICANT CHANGE UP
AMYLASE P1 CFR SERPL: 49 U/L — SIGNIFICANT CHANGE UP (ref 36–128)
ANION GAP SERPL CALC-SCNC: 13 MMOL/L — SIGNIFICANT CHANGE UP (ref 5–17)
BASOPHILS # BLD AUTO: 0.04 K/UL — SIGNIFICANT CHANGE UP (ref 0–0.2)
BASOPHILS NFR BLD AUTO: 0.5 % — SIGNIFICANT CHANGE UP (ref 0–2)
BUN SERPL-MCNC: 6.4 MG/DL — LOW (ref 8–20)
CALCIUM SERPL-MCNC: 7.5 MG/DL — LOW (ref 8.4–10.5)
CHLORIDE SERPL-SCNC: 109 MMOL/L — HIGH (ref 96–108)
CO2 SERPL-SCNC: 21 MMOL/L — LOW (ref 22–29)
CREAT SERPL-MCNC: 0.71 MG/DL — SIGNIFICANT CHANGE UP (ref 0.5–1.3)
EGFR: 92 ML/MIN/1.73M2 — SIGNIFICANT CHANGE UP
EOSINOPHIL # BLD AUTO: 0.5 K/UL — SIGNIFICANT CHANGE UP (ref 0–0.5)
EOSINOPHIL NFR BLD AUTO: 6.2 % — HIGH (ref 0–6)
GLUCOSE BLDC GLUCOMTR-MCNC: 88 MG/DL — SIGNIFICANT CHANGE UP (ref 70–99)
GLUCOSE BLDC GLUCOMTR-MCNC: 90 MG/DL — SIGNIFICANT CHANGE UP (ref 70–99)
GLUCOSE SERPL-MCNC: 100 MG/DL — HIGH (ref 70–99)
HCT VFR BLD CALC: 26.6 % — LOW (ref 34.5–45)
HGB BLD-MCNC: 8.5 G/DL — LOW (ref 11.5–15.5)
IMM GRANULOCYTES NFR BLD AUTO: 0.5 % — SIGNIFICANT CHANGE UP (ref 0–0.9)
LYMPHOCYTES # BLD AUTO: 1.54 K/UL — SIGNIFICANT CHANGE UP (ref 1–3.3)
LYMPHOCYTES # BLD AUTO: 19 % — SIGNIFICANT CHANGE UP (ref 13–44)
MAGNESIUM SERPL-MCNC: 2 MG/DL — SIGNIFICANT CHANGE UP (ref 1.8–2.6)
MCHC RBC-ENTMCNC: 27.4 PG — SIGNIFICANT CHANGE UP (ref 27–34)
MCHC RBC-ENTMCNC: 32 GM/DL — SIGNIFICANT CHANGE UP (ref 32–36)
MCV RBC AUTO: 85.8 FL — SIGNIFICANT CHANGE UP (ref 80–100)
METHOD TYPE: SIGNIFICANT CHANGE UP
MONOCYTES # BLD AUTO: 0.38 K/UL — SIGNIFICANT CHANGE UP (ref 0–0.9)
MONOCYTES NFR BLD AUTO: 4.7 % — SIGNIFICANT CHANGE UP (ref 2–14)
NEUTROPHILS # BLD AUTO: 5.62 K/UL — SIGNIFICANT CHANGE UP (ref 1.8–7.4)
NEUTROPHILS NFR BLD AUTO: 69.1 % — SIGNIFICANT CHANGE UP (ref 43–77)
PHOSPHATE SERPL-MCNC: 3.2 MG/DL — SIGNIFICANT CHANGE UP (ref 2.4–4.7)
PLATELET # BLD AUTO: 386 K/UL — SIGNIFICANT CHANGE UP (ref 150–400)
POTASSIUM SERPL-MCNC: 3.7 MMOL/L — SIGNIFICANT CHANGE UP (ref 3.5–5.3)
POTASSIUM SERPL-SCNC: 3.7 MMOL/L — SIGNIFICANT CHANGE UP (ref 3.5–5.3)
RBC # BLD: 3.1 M/UL — LOW (ref 3.8–5.2)
RBC # FLD: 15.5 % — HIGH (ref 10.3–14.5)
SODIUM SERPL-SCNC: 143 MMOL/L — SIGNIFICANT CHANGE UP (ref 135–145)
WBC # BLD: 8.12 K/UL — SIGNIFICANT CHANGE UP (ref 3.8–10.5)
WBC # FLD AUTO: 8.12 K/UL — SIGNIFICANT CHANGE UP (ref 3.8–10.5)

## 2023-05-19 PROCEDURE — 82435 ASSAY OF BLOOD CHLORIDE: CPT

## 2023-05-19 PROCEDURE — 71045 X-RAY EXAM CHEST 1 VIEW: CPT

## 2023-05-19 PROCEDURE — 84132 ASSAY OF SERUM POTASSIUM: CPT

## 2023-05-19 PROCEDURE — 82150 ASSAY OF AMYLASE: CPT

## 2023-05-19 PROCEDURE — 86901 BLOOD TYPING SEROLOGIC RH(D): CPT

## 2023-05-19 PROCEDURE — 84295 ASSAY OF SERUM SODIUM: CPT

## 2023-05-19 PROCEDURE — 86900 BLOOD TYPING SEROLOGIC ABO: CPT

## 2023-05-19 PROCEDURE — 99285 EMERGENCY DEPT VISIT HI MDM: CPT | Mod: 25

## 2023-05-19 PROCEDURE — 85730 THROMBOPLASTIN TIME PARTIAL: CPT

## 2023-05-19 PROCEDURE — 83605 ASSAY OF LACTIC ACID: CPT

## 2023-05-19 PROCEDURE — C9399: CPT

## 2023-05-19 PROCEDURE — 83735 ASSAY OF MAGNESIUM: CPT

## 2023-05-19 PROCEDURE — 80048 BASIC METABOLIC PNL TOTAL CA: CPT

## 2023-05-19 PROCEDURE — 85014 HEMATOCRIT: CPT

## 2023-05-19 PROCEDURE — 88304 TISSUE EXAM BY PATHOLOGIST: CPT

## 2023-05-19 PROCEDURE — 85018 HEMOGLOBIN: CPT

## 2023-05-19 PROCEDURE — 87077 CULTURE AEROBIC IDENTIFY: CPT

## 2023-05-19 PROCEDURE — 82803 BLOOD GASES ANY COMBINATION: CPT

## 2023-05-19 PROCEDURE — 74018 RADEX ABDOMEN 1 VIEW: CPT

## 2023-05-19 PROCEDURE — 82330 ASSAY OF CALCIUM: CPT

## 2023-05-19 PROCEDURE — C1889: CPT

## 2023-05-19 PROCEDURE — 84100 ASSAY OF PHOSPHORUS: CPT

## 2023-05-19 PROCEDURE — 36415 COLL VENOUS BLD VENIPUNCTURE: CPT

## 2023-05-19 PROCEDURE — 87641 MR-STAPH DNA AMP PROBE: CPT

## 2023-05-19 PROCEDURE — 88300 SURGICAL PATH GROSS: CPT

## 2023-05-19 PROCEDURE — 82947 ASSAY GLUCOSE BLOOD QUANT: CPT

## 2023-05-19 PROCEDURE — 83690 ASSAY OF LIPASE: CPT

## 2023-05-19 PROCEDURE — 88305 TISSUE EXAM BY PATHOLOGIST: CPT

## 2023-05-19 PROCEDURE — 96374 THER/PROPH/DIAG INJ IV PUSH: CPT

## 2023-05-19 PROCEDURE — 87075 CULTR BACTERIA EXCEPT BLOOD: CPT

## 2023-05-19 PROCEDURE — 83036 HEMOGLOBIN GLYCOSYLATED A1C: CPT

## 2023-05-19 PROCEDURE — 85610 PROTHROMBIN TIME: CPT

## 2023-05-19 PROCEDURE — 85025 COMPLETE CBC W/AUTO DIFF WBC: CPT

## 2023-05-19 PROCEDURE — 87205 SMEAR GRAM STAIN: CPT

## 2023-05-19 PROCEDURE — 86850 RBC ANTIBODY SCREEN: CPT

## 2023-05-19 PROCEDURE — 88309 TISSUE EXAM BY PATHOLOGIST: CPT

## 2023-05-19 PROCEDURE — 82962 GLUCOSE BLOOD TEST: CPT

## 2023-05-19 PROCEDURE — P9045: CPT

## 2023-05-19 PROCEDURE — 87640 STAPH A DNA AMP PROBE: CPT

## 2023-05-19 PROCEDURE — 85027 COMPLETE CBC AUTOMATED: CPT

## 2023-05-19 PROCEDURE — 86923 COMPATIBILITY TEST ELECTRIC: CPT

## 2023-05-19 PROCEDURE — 80076 HEPATIC FUNCTION PANEL: CPT

## 2023-05-19 PROCEDURE — 93005 ELECTROCARDIOGRAM TRACING: CPT

## 2023-05-19 PROCEDURE — 87070 CULTURE OTHR SPECIMN AEROBIC: CPT

## 2023-05-19 PROCEDURE — 80053 COMPREHEN METABOLIC PANEL: CPT

## 2023-05-19 PROCEDURE — 87186 SC STD MICRODIL/AGAR DIL: CPT

## 2023-05-19 RX ORDER — POTASSIUM CHLORIDE 20 MEQ
40 PACKET (EA) ORAL ONCE
Refills: 0 | Status: COMPLETED | OUTPATIENT
Start: 2023-05-19 | End: 2023-05-19

## 2023-05-19 RX ORDER — PANTOPRAZOLE SODIUM 20 MG/1
1 TABLET, DELAYED RELEASE ORAL
Qty: 30 | Refills: 0
Start: 2023-05-19 | End: 2023-06-17

## 2023-05-19 RX ORDER — OXYCODONE HYDROCHLORIDE 5 MG/1
1 TABLET ORAL
Qty: 6 | Refills: 0
Start: 2023-05-19 | End: 2023-05-20

## 2023-05-19 RX ADMIN — Medication 40 MILLIEQUIVALENT(S): at 09:05

## 2023-05-19 RX ADMIN — LOSARTAN POTASSIUM 100 MILLIGRAM(S): 100 TABLET, FILM COATED ORAL at 06:25

## 2023-05-19 RX ADMIN — Medication 15 MILLIGRAM(S): at 10:30

## 2023-05-19 RX ADMIN — Medication 15 MILLIGRAM(S): at 00:33

## 2023-05-19 RX ADMIN — Medication 15 MILLIGRAM(S): at 10:36

## 2023-05-19 RX ADMIN — Medication 15 MILLIGRAM(S): at 00:48

## 2023-05-19 RX ADMIN — PANTOPRAZOLE SODIUM 40 MILLIGRAM(S): 20 TABLET, DELAYED RELEASE ORAL at 10:34

## 2023-05-19 NOTE — DISCHARGE NOTE NURSING/CASE MANAGEMENT/SOCIAL WORK - NSDCPEFALRISK_GEN_ALL_CORE
For information on Fall & Injury Prevention, visit: [email or web address] OR  [email or web address] OR  https://www.cdc.gov/steadi/patient.html

## 2023-05-19 NOTE — DISCHARGE NOTE PROVIDER - HOSPITAL COURSE
69yo F w/ with duodenal mass presented on 5/12 with chief complaint of PO intolerance. Pt had been previously admitted and had an internal-external biliary drain placed. She returned to the ER with several days of nausea/vomiting and inability to tolerate PO. An NGT was placed and she was scheduled for a whipple procedure. She underwent non pylorus sparing whipple on 5/15. The procedure was uncomplicated and she was admitted to he ICU for management. Epidural and tap blocks were performed intra-op for pain control. Two michelle drains were placed as well as a prevana wound vac. Alatorre remained in place until POD1. She was started on sips of clear liquids. NGT was removed. She tolerated liquids and her diet was advanced as tolerated. Drain amylases were normal. Her Hgb remained stable. She was on zosyn postoperatively and it was discontinued on POD3. She was out of bed and pain was controlled. On POD4 she was discharged home in stable condition with one bulb drain and plan to follow up with Dr. Mckenzie in the outpatient setting.

## 2023-05-19 NOTE — DISCHARGE NOTE PROVIDER - NSDCFUADDINST_GEN_ALL_CORE_FT
Please follow up in Dr. Mckenzie in clinic within 2 weeks.   You may take pain medication as prescribed.  You may have a regular diet.   No heavy lifting or straining for four weeks postoperatively.   Please care for your drain as instructed by your nurse.

## 2023-05-19 NOTE — PROGRESS NOTE ADULT - REASON FOR ADMISSION
PO intolerance
PO intolerance, pancreatic head mass
PO intolerance

## 2023-05-19 NOTE — DISCHARGE NOTE PROVIDER - CARE PROVIDER_API CALL
Tigre Mckenzie)  Complex General Surgical Oncology; Surgery; Surgical Oncology  [address] [place] [address]  Phone: [phone]  Fax: [phone]  Follow Up Time: 2 weeks   Tigre Mckenzie)  Complex General Surgical Oncology; Surgery; Surgical Oncology  [address] [place] [address]  Phone: [phone]  Fax: [phone]  Follow Up Time: 1 week

## 2023-05-19 NOTE — DISCHARGE NOTE PROVIDER - PROVIDER TOKENS
PROVIDER:[TOKEN:[[id number]],FOLLOWUP:[2 weeks]] PROVIDER:[TOKEN:[[id number]],FOLLOWUP:[1 week]]

## 2023-05-19 NOTE — PROGRESS NOTE ADULT - SUBJECTIVE AND OBJECTIVE BOX
INTERVAL HPI/OVERNIGHT EVENTS:    Pt with PMH of metastatic adenocarcinoma complicated by a malignant SBO and obstructive jaundice with internal/ external drain placement by IR. She was discharged with plan to improve her nutritional status and return for a Whipple. She returned on 5/12 with 2 days of PO intolerance and abdominal pain/distension. Pt was taken for a Whipple procedure and now POD #1.  Upon arriving to the SICU pos op, pt was hypotensive and started on Levophed. POCUS evaluation revealed a collapsing IVC and patient received a bolus with 1 L of crystalloids and levo was weaned off.  Pt later developed hypotension requiring levophed again and POCUS performed with > 50% variability noted.  Pt received Albumin x 1 and again levophed was weaned off.  Pt states that pain is controlled and denies N/V.      MEDICATIONS  (STANDING):  bisacodyl Suppository 10 milliGRAM(s) Rectal daily  chlorhexidine 2% Cloths 1 Application(s) Topical daily  dextrose 5% + lactated ringers. 1000 milliLiter(s) (100 mL/Hr) IV Continuous <Continuous>  enoxaparin Injectable 40 milliGRAM(s) SubCutaneous every 24 hours  norepinephrine Infusion 0.02 MICROgram(s)/kG/Min (2.07 mL/Hr) IV Continuous <Continuous>  pantoprazole  Injectable 40 milliGRAM(s) IV Push daily  piperacillin/tazobactam IVPB.. 3.375 Gram(s) IV Intermittent every 8 hours    MEDICATIONS  (PRN):  acetaminophen   IVPB .. 1000 milliGRAM(s) IV Intermittent every 6 hours PRN Mild Pain (1 - 3), Moderate Pain (4 - 6), Severe Pain (7 - 10)      Drug Dosing Weight  Height (cm): 152.4 (15 May 2023 06:27)  Weight (kg): 55.293 (15 May 2023 06:27)  BMI (kg/m2): 23.8 (15 May 2023 06:27)  BSA (m2): 1.51 (15 May 2023 06:27)      PAST MEDICAL & SURGICAL HISTORY:  HTN (hypertension)      Duodenal mass          ICU Vital Signs Last 24 Hrs  T(C): 36.9 (15 May 2023 23:28), Max: 37.1 (15 May 2023 06:27)  T(F): 98.4 (15 May 2023 23:28), Max: 98.8 (15 May 2023 06:27)  HR: 82 (16 May 2023 00:15) (76 - 106)  BP: 111/46 (16 May 2023 00:00) (82/51 - 167/64)  BP(mean): 65 (16 May 2023 00:00) (61 - 92)  ABP: 100/41 (16 May 2023 00:15) (85/42 - 215/57)  ABP(mean): 61 (16 May 2023 00:15) (52 - 103)  RR: 12 (16 May 2023 00:15) (11 - 20)  SpO2: 93% (16 May 2023 00:15) (93% - 100%)    O2 Parameters below as of 16 May 2023 00:00  Patient On (Oxygen Delivery Method): room air            ABG - ( 15 May 2023 17:09 )  pH, Arterial: 7.330 pH, Blood: x     /  pCO2: 38    /  pO2: 83    / HCO3: 20    / Base Excess: -5.9  /  SaO2: 99.6                I&O's Detail    14 May 2023 07:01  -  15 May 2023 07:00  --------------------------------------------------------  IN:  Total IN: 0 mL    OUT:    Drain (mL): 500 mL    Nasogastric/Oral tube (mL): 1275 mL  Total OUT: 1775 mL    Total NET: -1775 mL      15 May 2023 07:01  -  16 May 2023 02:16  --------------------------------------------------------  IN:    dextrose 5% + lactated ringers: 200 mL    dextrose 5% + sodium chloride 0.45%: 300 mL    IV PiggyBack: 100 mL    IV PiggyBack: 50 mL    IV PiggyBack: 250 mL    IV PiggyBack: 100 mL    IV PiggyBack: 50 mL    Norepinephrine: 10.3 mL    Sodium Chloride 0.9% Bolus: 1000 mL  Total IN: 2060.3 mL    OUT:    Bulb (mL): 35 mL    Bulb (mL): 55 mL    Indwelling Catheter - Urethral (mL): 345 mL  Total OUT: 435 mL    Total NET: 1625.3 mL        Physical Exam:    Neurological:  Non-focal.  Moving all extremities.  No appreciable motor deficits    HEENT: PERRLA, no drainage or redness.     Neck: Neck supple, No JVD    Respiratory:  Trachea midline, equal chest rise.  Breath Sounds equal bilateral    Cardiovascular: Regular rate & rhythm, normal S1, S2    Gastrointestinal: ND, Soft, dressing clean, dry, intact. Left and right bulb drains putting out serosanguinous fluids.  Soft with no rigidity, no rebound, no guarding    Extremities: No peripheral edema, No cyanosis, clubbing     Vascular: Equal and normal pulses: 2+ peripheral pulses throughout    Skin: Surgical incisions, C/D/I      LABS:  CBC Full  -  ( 15 May 2023 16:10 )  WBC Count : 20.19 K/uL  RBC Count : 3.34 M/uL  Hemoglobin : 9.2 g/dL  Hematocrit : 28.0 %  Platelet Count - Automated : 382 K/uL  Mean Cell Volume : 83.8 fl  Mean Cell Hemoglobin : 27.5 pg  Mean Cell Hemoglobin Concentration : 32.9 gm/dL  Auto Neutrophil # : 18.78 K/uL  Auto Lymphocyte # : 0.89 K/uL  Auto Monocyte # : 0.52 K/uL  Auto Eosinophil # : 0.00 K/uL  Auto Basophil # : 0.00 K/uL  Auto Neutrophil % : 93.0 %  Auto Lymphocyte % : 4.4 %  Auto Monocyte % : 2.6 %  Auto Eosinophil % : 0.0 %  Auto Basophil % : 0.0 %    05-15    137  |  105  |  9.3  ----------------------------<  178<H>  4.2   |  20.0<L>  |  0.65    Ca    7.2<L>      15 May 2023 16:10  Phos  3.6     05-15  Mg     1.2     05-15    TPro  4.7<L>  /  Alb  2.7<L>  /  TBili  1.8  /  DBili  1.3<H>  /  AST  304<H>  /  ALT  327<H>  /  AlkPhos  94  05-15    PT/INR - ( 15 May 2023 16:10 )   PT: 15.1 sec;   INR: 1.30 ratio         PTT - ( 14 May 2023 05:20 )  PTT:34.6 sec        
Subjective: Resting comfortably, mild pain but controlled. Tolerating small sips of liquids. Denies n/v. Vitals stable, no acute events overnight.       STATUS POST:  whipple    POST OPERATIVE DAY #:     MEDICATIONS  (STANDING):  acetaminophen     Tablet .. 650 milliGRAM(s) Oral once  bisacodyl Suppository 10 milliGRAM(s) Rectal daily  dextrose 5% + sodium chloride 0.45% with potassium chloride 20 mEq/L 1000 milliLiter(s) (84 mL/Hr) IV Continuous <Continuous>  enoxaparin Injectable 40 milliGRAM(s) SubCutaneous every 24 hours  insulin lispro (ADMELOG) corrective regimen sliding scale   SubCutaneous three times a day before meals  ketorolac   Injectable 15 milliGRAM(s) IV Push every 6 hours  losartan 100 milliGRAM(s) Oral daily  pantoprazole  Injectable 40 milliGRAM(s) IV Push daily  piperacillin/tazobactam IVPB.. 3.375 Gram(s) IV Intermittent every 8 hours    MEDICATIONS  (PRN):  HYDROmorphone  Injectable 0.5 milliGRAM(s) IV Push every 4 hours PRN Severe Pain (7 - 10)  ketorolac   Injectable 15 milliGRAM(s) IV Push every 6 hours PRN Moderate Pain (4 - 6)      Vital Signs Last 24 Hrs  T(C): 36.7 (18 May 2023 04:00), Max: 36.7 (17 May 2023 11:52)  T(F): 98 (18 May 2023 04:00), Max: 98.1 (17 May 2023 15:15)  HR: 83 (18 May 2023 04:00) (68 - 96)  BP: 145/50 (18 May 2023 04:00) (128/82 - 176/82)  BP(mean): 88 (17 May 2023 16:00) (83 - 98)  RR: 18 (18 May 2023 04:00) (16 - 27)  SpO2: 94% (18 May 2023 04:00) (94% - 100%)    Parameters below as of 18 May 2023 04:00  Patient On (Oxygen Delivery Method): room air        Physical Exam:    Constitutional: NAD  HEENT: PERRL, EOMI  Neck: No JVD, FROM without pain  Respiratory: Respirations non-labored, no accessory muscle use  Gastrointestinal: Soft, mildly tender, mildly distended. Has prevena in place. Bulb drains with serosanguinous output.   Extremities: No peripheral edema, No cyanosis  Neurological: A&O x 3; without gross deficit  Musculoskeletal: No joint pain, swelling, deformity, or point tenderness; no limitation of movement      LABS:                        7.5    7.61  )-----------( 297      ( 18 May 2023 05:20 )             23.6     05-17    142  |  110<H>  |  6.5<L>  ----------------------------<  123<H>  3.6   |  21.0<L>  |  0.86    Ca    7.4<L>      17 May 2023 03:30  Phos  1.7     05-17  Mg     1.8     05-17    TPro  4.6<L>  /  Alb  2.5<L>  /  TBili  1.2  /  DBili  x   /  AST  557<H>  /  ALT  811<H>  /  AlkPhos  77  05-17  
HPI/OVERNIGHT EVENTS: Patient seen and examined at bedside this AM. No overnight events. Patient is not passing flatus yet, but states having 1 BM this AM, Voiding. No complaints. Pain controlled. Tolerating diet. Afebrile. VSS    Vital Signs Last 24 Hrs  T(C): 36.2 (17 May 2023 04:03), Max: 36.8 (16 May 2023 08:08)  T(F): 97.2 (17 May 2023 04:03), Max: 98.3 (16 May 2023 23:51)  HR: 78 (17 May 2023 05:00) (69 - 85)  BP: 139/50 (17 May 2023 05:00) (102/57 - 140/58)  BP(mean): 76 (17 May 2023 05:00) (64 - 91)  RR: 15 (17 May 2023 05:00) (14 - 22)  SpO2: 99% (17 May 2023 05:00) (94% - 100%)    Parameters below as of 16 May 2023 20:00  Patient On (Oxygen Delivery Method): room air        I&O's Detail    16 May 2023 07:01  -  17 May 2023 07:00  --------------------------------------------------------  IN:    dextrose 5% + sodium chloride 0.45% w/ Additives: 1428 mL    IV PiggyBack: 300 mL    IV PiggyBack: 275 mL    Lactated Ringers: 600 mL  Total IN: 2603 mL    OUT:    Bulb (mL): 390 mL    Bulb (mL): 160 mL    Indwelling Catheter - Urethral (mL): 205 mL    Norepinephrine: 0 mL    Voided (mL): 450 mL  Total OUT: 1205 mL    Total NET: 1398 mL          Constitutional: patient resting comfortably in bed, in no acute distress  HEENT: EOMI, PERRLA, MMM.  Respiratory: Non labored breathing on RA  Cardiovascular: RRR  Gastrointestinal: Abdomen soft, mildlytender, non-distended, no rebound tenderness / guarding, prevena in place, JP1 and JP2 drains with 90cc and 290cc of serosangiuinous output, respectively.   Musculoskeletal: No joint pain, swelling or deformity; no limitation of movement  Vascular: Extremities warm and well perfused.     LABS:                        7.6    10.31 )-----------( 249      ( 17 May 2023 03:30 )             23.2     05-17    142  |  110<H>  |  6.5<L>  ----------------------------<  123<H>  3.6   |  21.0<L>  |  0.86    Ca    7.4<L>      17 May 2023 03:30  Phos  1.7     05-17  Mg     1.8     05-17    TPro  4.6<L>  /  Alb  2.5<L>  /  TBili  1.2  /  DBili  x   /  AST  557<H>  /  ALT  811<H>  /  AlkPhos  77  05-17    PT/INR - ( 15 May 2023 16:10 )   PT: 15.1 sec;   INR: 1.30 ratio               MEDICATIONS  (STANDING):  acetaminophen   IVPB .. 1000 milliGRAM(s) IV Intermittent every 6 hours  bisacodyl Suppository 10 milliGRAM(s) Rectal daily  chlorhexidine 2% Cloths 1 Application(s) Topical daily  dextrose 5% + sodium chloride 0.45% with potassium chloride 20 mEq/L 1000 milliLiter(s) (84 mL/Hr) IV Continuous <Continuous>  enoxaparin Injectable 40 milliGRAM(s) SubCutaneous every 24 hours  insulin lispro (ADMELOG) corrective regimen sliding scale   SubCutaneous three times a day before meals  ketorolac   Injectable 15 milliGRAM(s) IV Push every 6 hours  magnesium sulfate  IVPB 2 Gram(s) IV Intermittent once  pantoprazole  Injectable 40 milliGRAM(s) IV Push daily  piperacillin/tazobactam IVPB.. 3.375 Gram(s) IV Intermittent every 8 hours    MEDICATIONS  (PRN):  HYDROmorphone  Injectable 0.5 milliGRAM(s) IV Push every 4 hours PRN Severe Pain (7 - 10)  ketorolac   Injectable 15 milliGRAM(s) IV Push every 6 hours PRN Moderate Pain (4 - 6)      MICRO:   Cultures     STUDIES:   EKG, CXR, U/S, CT, MRI   
INTERVAL HPI/OVERNIGHT EVENTS:    Patient evaluated at bedside. No acute distress. No acute events overnight.  NGT in place with 7oocc output  Pain well controlled  Afebrile      MEDICATIONS  (STANDING):  benzocaine 20% Spray 1 Spray(s) Topical once  sodium chloride 0.9%. 1000 milliLiter(s) (120 mL/Hr) IV Continuous <Continuous>    MEDICATIONS  (PRN):  acetaminophen     Tablet .. 650 milliGRAM(s) Oral every 6 hours PRN Temp greater or equal to 38C (100.4F), Mild Pain (1 - 3)      Vital Signs Last 24 Hrs  T(C): 36.8 (14 May 2023 14:21), Max: 36.8 (13 May 2023 16:50)  T(F): 98.2 (14 May 2023 14:21), Max: 98.2 (13 May 2023 16:50)  HR: 75 (14 May 2023 14:21) (75 - 84)  BP: 162/62 (14 May 2023 14:21) (133/70 - 169/67)  BP(mean): --  RR: 17 (14 May 2023 14:21) (17 - 18)  SpO2: 95% (14 May 2023 14:21) (93% - 97%)    Parameters below as of 14 May 2023 14:21  Patient On (Oxygen Delivery Method): room air        Physical Exam:  Constitutional: NAD. NGT in place to suction.   HEENT: PERRL, EOMI  Neck: No JVD, FROM without pain  Respiratory: Respirations non-labored, no accessory muscle use  Gastrointestinal: Soft, non-tender, non-distended  Extremities: No peripheral edema, No cyanosis  Neurological: A&O x 3; without gross deficit  Musculoskeletal: No joint pain, swelling, deformity, or point tenderness; no limitation of movement    I&O's Detail    13 May 2023 07:01  -  14 May 2023 07:00  --------------------------------------------------------  IN:  Total IN: 0 mL    OUT:    Drain (mL): 675 mL    Nasogastric/Oral tube (mL): 700 mL  Total OUT: 1375 mL    Total NET: -1375 mL          LABS:                        10.5   11.21 )-----------( 424      ( 14 May 2023 05:20 )             33.9     05-14    139  |  100  |  20.3<H>  ----------------------------<  68<L>  4.0   |  17.0<L>  |  0.78    Ca    8.6      14 May 2023 05:20  Phos  3.4     05-14  Mg     1.9     05-14    TPro  6.1<L>  /  Alb  2.7<L>  /  TBili  1.8  /  DBili  x   /  AST  54<H>  /  ALT  75<H>  /  AlkPhos  147<H>  05-14    PT/INR - ( 14 May 2023 05:20 )   PT: 13.6 sec;   INR: 1.17 ratio         PTT - ( 14 May 2023 05:20 )  PTT:34.6 sec      RADIOLOGY & ADDITIONAL STUDIES:
Subjective: Patient seen and examined at bedside, no acute complaints other than the discomfort from the NGT. Reports pain is well controlled.     STATUS POST:  open whipple     POST OPERATIVE DAY #: 1    MEDICATIONS  (STANDING):  bisacodyl Suppository 10 milliGRAM(s) Rectal daily  chlorhexidine 2% Cloths 1 Application(s) Topical daily  dextrose 50% Injectable 25 Gram(s) IV Push once  dextrose 50% Injectable 12.5 Gram(s) IV Push once  dextrose 50% Injectable 25 Gram(s) IV Push once  enoxaparin Injectable 40 milliGRAM(s) SubCutaneous every 24 hours  glucagon  Injectable 1 milliGRAM(s) IntraMuscular once  insulin lispro (ADMELOG) corrective regimen sliding scale   SubCutaneous every 6 hours  lactated ringers. 1000 milliLiter(s) (100 mL/Hr) IV Continuous <Continuous>  norepinephrine Infusion 0.02 MICROgram(s)/kG/Min (2.07 mL/Hr) IV Continuous <Continuous>  pantoprazole  Injectable 40 milliGRAM(s) IV Push daily  piperacillin/tazobactam IVPB.. 3.375 Gram(s) IV Intermittent every 8 hours  MEDICATIONS  (PRN):  acetaminophen   IVPB .. 1000 milliGRAM(s) IV Intermittent every 6 hours PRN Mild Pain (1 - 3), Moderate Pain (4 - 6), Severe Pain (7 - 10)  dextrose Oral Gel 15 Gram(s) Oral once PRN Blood Glucose LESS THAN 70 milliGRAM(s)/deciliter    Vital Signs Last 24 Hrs  T(C): 36.8 (16 May 2023 03:46), Max: 36.9 (15 May 2023 23:28)  T(F): 98.3 (16 May 2023 03:46), Max: 98.4 (15 May 2023 23:28)  HR: 76 (16 May 2023 06:30) (73 - 106)  BP: 111/43 (16 May 2023 06:00) (82/51 - 143/71)  BP(mean): 63 (16 May 2023 06:00) (61 - 92)  RR: 17 (16 May 2023 06:30) (10 - 20)  SpO2: 94% (16 May 2023 06:30) (93% - 99%)    Parameters below as of 16 May 2023 04:00  Patient On (Oxygen Delivery Method): room air    Physical Exam:  Constitutional: NAD  HEENT: PERRL, EOMI, NGT   Neck: No JVD, FROM without pain  Respiratory: Respirations non-labored, no accessory muscle use  Gastrointestinal: Soft, appropriately tender, no distention, no rebound or guarding, RQ drain, LQ drain, midline prevena c/d/i    Extremities: No peripheral edema, No cyanosis  Neurological: A&O x 3; without gross deficit  : espinoza in place with clear yellow urine     LABS:               7.8    13.47 )-----------( 270      ( 16 May 2023 03:30 )             23.2   05-16  138  |  106  |  7.7<L>  ----------------------------<  200<H>  3.9   |  22.0  |  0.73  Ca    7.5<L>      16 May 2023 03:30  Phos  3.2     05-16  Mg     2.1     05-16  TPro  4.5<L>  /  Alb  2.9<L>  /  TBili  1.3  /  DBili  x   /  AST  327<H>  /  ALT  366<H>  /  AlkPhos  72  05-16  PT/INR - ( 15 May 2023 16:10 )   PT: 15.1 sec;   INR: 1.30 ratio      A: Patient is a 67 yo F s/p open whipple for metastatic adenocarcinoma, pod#1. Prior to the OR, she had internal and external drain placement on 5/5.     Plan:   - SICU care  - F/u drain amylases   - Likely DC NGT later today   - DC espinoza, F/U TOV  - pain control  - DVT ppx   - continue zosyn   - AM labs   - Monitor drain output, NGT output   - Continue whipple pathway protocol   
Subjective: Pt resting comfortably. NGT in place, she has had no further episodes of n/v. Remains NPO. Denies abd pain. Vitals stable, no acute events overnight.     MEDICATIONS  (STANDING):  enoxaparin Injectable 40 milliGRAM(s) SubCutaneous every 24 hours  sodium chloride 0.9%. 1000 milliLiter(s) (120 mL/Hr) IV Continuous <Continuous>    MEDICATIONS  (PRN):      Vital Signs Last 24 Hrs  T(C): 36.7 (13 May 2023 05:00), Max: 36.8 (12 May 2023 23:46)  T(F): 98 (13 May 2023 05:00), Max: 98.3 (12 May 2023 23:46)  HR: 84 (13 May 2023 05:00) (84 - 94)  BP: 146/74 (13 May 2023 05:00) (128/65 - 153/67)  BP(mean): --  RR: 18 (13 May 2023 05:00) (17 - 20)  SpO2: 93% (13 May 2023 05:00) (93% - 98%)    Parameters below as of 13 May 2023 05:00  Patient On (Oxygen Delivery Method): room air        Physical Exam:  Constitutional: NAD. NGT in place to suction.   HEENT: PERRL, EOMI  Neck: No JVD, FROM without pain  Respiratory: Respirations non-labored, no accessory muscle use  Gastrointestinal: Soft, non-tender, non-distended  Extremities: No peripheral edema, No cyanosis  Neurological: A&O x 3; without gross deficit  Musculoskeletal: No joint pain, swelling, deformity, or point tenderness; no limitation of movement      LABS:                        9.9    12.79 )-----------( 370      ( 13 May 2023 04:14 )             31.3     05-13    137  |  101  |  23.2<H>  ----------------------------<  114<H>  4.3   |  23.0  |  0.91    Ca    8.5      13 May 2023 05:35  Phos  3.0     05-13  Mg     2.0     05-13    TPro  6.1<L>  /  Alb  3.2<L>  /  TBili  1.9  /  DBili  x   /  AST  35<H>  /  ALT  84<H>  /  AlkPhos  134<H>  05-13    PT/INR - ( 12 May 2023 20:39 )   PT: 13.0 sec;   INR: 1.12 ratio         PTT - ( 12 May 2023 20:39 )  PTT:29.7 sec
INTERVAL HPI/OVERNIGHT EVENTS:    Pt weaned off vasopressor support this morning and maintaining MAP > 65.  Patient continues with clinical improvement.  NGT DC'd and started on sips of clears.  Pt remains with minimal abdominal pain.  PT was OOB.  Drain amylase stable.  Alatorre DC'd.      MEDICATIONS  (STANDING):  acetaminophen   IVPB .. 1000 milliGRAM(s) IV Intermittent every 6 hours  bisacodyl Suppository 10 milliGRAM(s) Rectal daily  chlorhexidine 2% Cloths 1 Application(s) Topical daily  dextrose 5% + sodium chloride 0.45% with potassium chloride 20 mEq/L 1000 milliLiter(s) (84 mL/Hr) IV Continuous <Continuous>  enoxaparin Injectable 40 milliGRAM(s) SubCutaneous every 24 hours  insulin lispro (ADMELOG) corrective regimen sliding scale   SubCutaneous three times a day before meals  ketorolac   Injectable 15 milliGRAM(s) IV Push every 6 hours  pantoprazole  Injectable 40 milliGRAM(s) IV Push daily  piperacillin/tazobactam IVPB.. 3.375 Gram(s) IV Intermittent every 8 hours    MEDICATIONS  (PRN):  HYDROmorphone  Injectable 0.5 milliGRAM(s) IV Push every 4 hours PRN Severe Pain (7 - 10)  ketorolac   Injectable 15 milliGRAM(s) IV Push every 6 hours PRN Moderate Pain (4 - 6)      Drug Dosing Weight  Height (cm): 152.4 (15 May 2023 06:27)  Weight (kg): 55.293 (15 May 2023 06:27)  BMI (kg/m2): 23.8 (15 May 2023 06:27)  BSA (m2): 1.51 (15 May 2023 06:27)      PAST MEDICAL & SURGICAL HISTORY:  HTN (hypertension)      Duodenal mass          ICU Vital Signs Last 24 Hrs  T(C): 36.8 (16 May 2023 23:51), Max: 36.8 (16 May 2023 03:46)  T(F): 98.3 (16 May 2023 23:51), Max: 98.3 (16 May 2023 03:46)  HR: 73 (17 May 2023 00:00) (70 - 88)  BP: 118/52 (17 May 2023 00:00) (100/45 - 140/58)  BP(mean): 73 (17 May 2023 00:00) (62 - 91)  ABP: 110/45 (16 May 2023 11:00) (94/44 - 137/56)  ABP(mean): 69 (16 May 2023 11:00) (60 - 94)  RR: 15 (17 May 2023 00:00) (10 - 22)  SpO2: 94% (17 May 2023 00:00) (93% - 100%)    O2 Parameters below as of 16 May 2023 20:00  Patient On (Oxygen Delivery Method): room air            ABG - ( 15 May 2023 17:09 )  pH, Arterial: 7.330 pH, Blood: x     /  pCO2: 38    /  pO2: 83    / HCO3: 20    / Base Excess: -5.9  /  SaO2: 99.6                I&O's Detail    15 May 2023 07:01  -  16 May 2023 07:00  --------------------------------------------------------  IN:    dextrose 5% + lactated ringers: 900 mL    dextrose 5% + sodium chloride 0.45%: 300 mL    IV PiggyBack: 100 mL    IV PiggyBack: 50 mL    IV PiggyBack: 250 mL    IV PiggyBack: 200 mL    IV PiggyBack: 175 mL    Lactated Ringers: 100 mL    Norepinephrine: 27.7 mL    Sodium Chloride 0.9% Bolus: 1000 mL  Total IN: 3102.7 mL    OUT:    Bulb (mL): 95 mL    Bulb (mL): 75 mL    Indwelling Catheter - Urethral (mL): 845 mL    Nasogastric/Oral tube (mL): 100 mL  Total OUT: 1115 mL    Total NET: 1987.7 mL      16 May 2023 07:01  -  17 May 2023 00:44  --------------------------------------------------------  IN:    dextrose 5% + sodium chloride 0.45% w/ Additives: 840 mL    IV PiggyBack: 200 mL    IV PiggyBack: 225 mL    Lactated Ringers: 600 mL  Total IN: 1865 mL    OUT:    Bulb (mL): 290 mL    Bulb (mL): 90 mL    Indwelling Catheter - Urethral (mL): 205 mL    Norepinephrine: 0 mL    Voided (mL): 100 mL  Total OUT: 685 mL    Total NET: 1180 mL      Physical Exam:    Neurological:  Alert and oriented x 3.  Non-focal.  Moving all extremities.  No appreciable motor deficits    HEENT: PERRLA, no drainage or redness.     Neck: Neck supple, No JVD    Respiratory:  Trachea midline, equal chest rise.  Breath Sounds equal bilateral    Cardiovascular: Regular rate & rhythm, normal S1, S2    Gastrointestinal: ND, Soft, dressing clean, dry, intact. Left and right bulb drains putting out serosanguinous fluids.  Soft with no rigidity, no rebound, no guarding    Extremities: No peripheral edema, No cyanosis, clubbing     Vascular: Equal and normal pulses: 2+ peripheral pulses throughout    Skin: Surgical incisions, C/D/I      LABS:  CBC Full  -  ( 16 May 2023 14:30 )  WBC Count : x  RBC Count : x  Hemoglobin : 8.3 g/dL  Hematocrit : 24.8 %  Platelet Count - Automated : x  Mean Cell Volume : x  Mean Cell Hemoglobin : x  Mean Cell Hemoglobin Concentration : x  Auto Neutrophil # : x  Auto Lymphocyte # : x  Auto Monocyte # : x  Auto Eosinophil # : x  Auto Basophil # : x  Auto Neutrophil % : x  Auto Lymphocyte % : x  Auto Monocyte % : x  Auto Eosinophil % : x  Auto Basophil % : x    05-16    138  |  106  |  7.7<L>  ----------------------------<  200<H>  3.9   |  22.0  |  0.73    Ca    7.5<L>      16 May 2023 03:30  Phos  3.2     05-16  Mg     2.1     05-16    TPro  4.5<L>  /  Alb  2.9<L>  /  TBili  1.3  /  DBili  x   /  AST  327<H>  /  ALT  366<H>  /  AlkPhos  72  05-16    PT/INR - ( 15 May 2023 16:10 )   PT: 15.1 sec;   INR: 1.30 ratio                 
Subjective: Feeling well this morning. Has been tolerating diet. Having bowel function. Has been OOB. No n/v. Vitals stable, no acute events overnight.     STATUS POST: whipple    POST OPERATIVE DAY #: 4    MEDICATIONS  (STANDING):  bisacodyl Suppository 10 milliGRAM(s) Rectal daily  enoxaparin Injectable 40 milliGRAM(s) SubCutaneous every 24 hours  hydrochlorothiazide 12.5 milliGRAM(s) Oral daily  insulin lispro (ADMELOG) corrective regimen sliding scale   SubCutaneous three times a day before meals  ketorolac   Injectable 15 milliGRAM(s) IV Push every 6 hours  losartan 100 milliGRAM(s) Oral daily  pantoprazole  Injectable 40 milliGRAM(s) IV Push daily    MEDICATIONS  (PRN):  HYDROmorphone  Injectable 0.5 milliGRAM(s) IV Push every 4 hours PRN Severe Pain (7 - 10)  ketorolac   Injectable 15 milliGRAM(s) IV Push every 6 hours PRN Moderate Pain (4 - 6)      Vital Signs Last 24 Hrs  T(C): 36.4 (19 May 2023 08:21), Max: 36.8 (18 May 2023 14:00)  T(F): 97.6 (19 May 2023 08:21), Max: 98.3 (18 May 2023 14:00)  HR: 78 (19 May 2023 08:21) (78 - 98)  BP: 176/66 (19 May 2023 08:21) (158/67 - 186/72)  BP(mean): --  RR: 17 (19 May 2023 08:21) (17 - 19)  SpO2: 97% (19 May 2023 08:21) (96% - 98%)    Parameters below as of 19 May 2023 08:21  Patient On (Oxygen Delivery Method): room air        Physical Exam:  Constitutional: NAD  HEENT: PERRL, EOMI  Neck: No JVD, FROM without pain  Respiratory: Respirations non-labored, no accessory muscle use  Gastrointestinal: Soft, mildly tender, mildly distended. Has prevena in place. Bulb drains with serosanguinous output.   Extremities: No peripheral edema, No cyanosis  Neurological: A&O x 3; without gross deficit  Musculoskeletal: No joint pain, swelling, deformity, or point tenderness; no limitation of movement      LABS:                        8.5    8.12  )-----------( 386      ( 19 May 2023 07:05 )             26.6     05-19    143  |  109<H>  |  6.4<L>  ----------------------------<  100<H>  3.7   |  21.0<L>  |  0.71    Ca    7.5<L>      19 May 2023 07:05  Phos  3.2     05-19  Mg     2.0     05-19        
Subjective: Pt resting comfortably, no acute events overnight.     MEDICATIONS  (STANDING):  dextrose 5% + sodium chloride 0.45%. 1000 milliLiter(s) (100 mL/Hr) IV Continuous <Continuous>  potassium chloride  20 mEq/100 mL IVPB 20 milliEquivalent(s) IV Intermittent every 2 hours    MEDICATIONS  (PRN):  acetaminophen     Tablet .. 650 milliGRAM(s) Oral every 6 hours PRN Temp greater or equal to 38C (100.4F), Mild Pain (1 - 3)      Vital Signs Last 24 Hrs  T(C): 37.1 (15 May 2023 06:27), Max: 37.1 (15 May 2023 06:27)  T(F): 98.8 (15 May 2023 06:27), Max: 98.8 (15 May 2023 06:27)  HR: 80 (15 May 2023 06:27) (75 - 86)  BP: 167/64 (15 May 2023 06:27) (140/65 - 185/68)  BP(mean): --  RR: 16 (15 May 2023 06:27) (16 - 18)  SpO2: 100% (15 May 2023 06:27) (94% - 100%)    Parameters below as of 15 May 2023 04:57  Patient On (Oxygen Delivery Method): room air        Physical Exam:  Constitutional: NAD  HEENT: PERRL, EOMI  Neck: No JVD, FROM without pain  Respiratory: Respirations non-labored, no accessory muscle use  Gastrointestinal: Soft, non-tender, non-distended. internal external biliary drain in place.   Extremities: No peripheral edema, No cyanosis  Neurological: A&O x 3; without gross deficit  Musculoskeletal: No joint pain, swelling, deformity, or point tenderness; no limitation of movement      LABS:                        9.7    9.28  )-----------( 359      ( 15 May 2023 02:52 )             30.5     05-15    142  |  104  |  13.8  ----------------------------<  151<H>  3.3<L>   |  21.0<L>  |  0.75    Ca    8.3<L>      15 May 2023 02:52  Phos  2.5     05-15  Mg     1.7     05-15    TPro  6.1<L>  /  Alb  2.7<L>  /  TBili  1.8  /  DBili  x   /  AST  54<H>  /  ALT  75<H>  /  AlkPhos  147<H>  05-14    PT/INR - ( 14 May 2023 05:20 )   PT: 13.6 sec;   INR: 1.17 ratio         PTT - ( 14 May 2023 05:20 )  PTT:34.6 sec

## 2023-05-19 NOTE — DISCHARGE NOTE PROVIDER - NSDCMRMEDTOKEN_GEN_ALL_CORE_FT
losartan-hydroCHLOROthiazide 100 mg-12.5 mg oral tablet: 1 orally once a day   losartan-hydroCHLOROthiazide 100 mg-12.5 mg oral tablet: 1 orally once a day  oxyCODONE 5 mg oral tablet: 1 tab(s) orally every 8 hours as needed for  severe pain MDD: 3 tabs  Protonix 40 mg oral delayed release tablet: 1 tab(s) orally once a day

## 2023-05-19 NOTE — DISCHARGE NOTE NURSING/CASE MANAGEMENT/SOCIAL WORK - PATIENT PORTAL LINK FT
You can access the FollowMyHealth Patient Portal offered by [facility] by registering at the following website: [email or web address]. By joining [facility]’s FollowMyHealth portal, you will also be able to view your health information using other applications (apps) compatible with our system.

## 2023-05-19 NOTE — PROGRESS NOTE ADULT - ASSESSMENT
68 year old women w/ pancreatic head mass recently admitted for malignant SBO and internal/extrenal biliary drain placement, now returning for PO intolerance and abdominal pain/distension. NGT in place. OR today for whipple.     PLAN:   - whipple today with Dr. Allen   - medicine clearance obtained  - SICU level of care postoperatively  - post op plan pending   
not examined
A: Patient is a 67 yo F s/p open whipple for metastatic adenocarcinoma, now POD4.     Plan:   - Drain amylases: normal, will send another drain amylase due to change in quality of ouput   - on regular diet, tolerating  - Celi ford'ed 5/16, voiding  - pain control  - DVT ppx   - AM labs   - Continue whipple pathway protocol 
A: Patient is a 67 yo F s/p open whipple for metastatic adenocarcinoma, pod#1. Prior to the OR, she had internal and external drain placement on 5/5.     Plan:   - ICU level of care  - Drain amylases: normal  - NGT dc'ed today  - on CLD  - Alatorre dc'ed 5/16, voiding  - pain control  - DVT ppx   - continue zosyn IV  - AM labs   - Continue whipple pathway protocol   
69 yo Female presenting with metastatic adenocarcinoma, malignant SBO and obstructive jaundice s/p Pancreaticoduodenectomy with distal gastrectomy on 5/15.     Neurological: Delirium precautions should be taken by making sure lights are on during the day and off at night. Pt receiving Ofirmev Q6 ATC. Will give Narcotics if needed.  Pt states pain is controlled.      Pulmonary: Encourage use of incentive spirometer during hospital stay.    Cardiovascular: Pt remains hemodynamically stable off vasopressor support s/p volume resuscitation POD 0.  Continue to monitor and maintain MAP > 65    Gastrointestinal: NGT removed and started on sips of clears as per Whipple pathway. Will follow the Whipple pathway as devised by Surgical Oncology team.  Continue to check surgical wound dressing to ensure it is clean, dry, and intact not showing any signs of infection. Monitor closely outputs from LLQ and RLQ INDER drains. Drain Amylase to be sent this AM.      Genitourinary:  Alatorre DC'd and voiding.  Monitor strict I and Os.  AM BMP and replete lytes as needed    Heme: DVT prophylaxis with Lovenox and SCDs.    ID: Pt on Zosyn to cover for organisms responsible for intraabdominal infection.    Lines/ Tubes: Peripheral IVs, left radial A-line, NGT    Dispo:  Floor transfer
A: Patient is a 69 yo F s/p open whipple for metastatic adenocarcinoma, now POD3.    Plan:   - Drain amylases: normal, continue to follow results  - on sips of clear liquids, possibly advance today   - Celi ford'ed 5/16, voiding  - pain control  - DVT ppx   - continue zosyn IV  - AM labs   - Continue whipple pathway protocol 
67 yo Female presenting with metastatic adenocarcinoma, malignant SBO and obstructive jaundice s/p Pancreaticoduodenectomy with distal gastrectomy on 5/15.     Neurological: Delirium precautions should be taken by making sure lights are on during the day and off at night. Pt receiving Ofirmev Q6 ATC. Will give Narcotics if needed.  Pt states pain is controlled.      Pulmonary: Encourage use of incentive spirometer during hospital stay.    Cardiovascular: Pt returned from the OR and became hypotensive with MAPs in the 50s .  POCUS: IVC poorly visulaized.  Heart: Good gross systolic function, No pericardial effusion, No R Heart Dilation. Widened pulse pressure suggests distributive shock more likely vs hypovolemia.  Pt started on levophed and Pt received 1 L IVF Bolus with POCUS evaluation demonstrating a collapsed IVC.  Received additional Albumin bolus with dripping MAP and again off pressors.  Pt with cleared lactic.      Gastrointestinal: Pt is strict NPO with NGT in place. Will follow the Whipple pathway as devised by Surgical Oncology team.  Continue to check surgical wound dressing to ensure it is clean, dry, and intact not showing any signs of infection. Monitor closely outputs from LLQ and RLQ INDER drains. Drain Amylase to be sent this AM.      Genitourinary: Monitor urine output using espnioza catheter that is in place. Plan for trial of void tomorrow if urine output remains adequate.    Heme: DVT prophylaxis with Lovenox and SCDs.    ID: Pt on Zosyn to cover for organisms responsible for intraabdominal infection.    Lines/ Tubes: Peripheral IVs, left radial A-line, NGT    Dispo: SICU for need for BP management and further post op care
68 year old women w/ pancreatic head mass recently admitted for malignant SBO and internal/extrenal biliary drain placement, now returning for PO intolerance and abdominal pain/distension. NGT in place.  Plan for surgery on this admission    PLAN:   - continue NPO, NGT  - medicine clearance obtained for surgery  - possible Whipple procedure this week   - Lovenox DVT ppx   
ASSESSMENT: 69YO F w/ pancreatic head mass recently admitted for malignant SBO and internal/extrenal biliary drain placement, now returning for PO intolerance and abdominal pain/distension.     PLAN:   - continue NPO, NGT  - medicine consult today for preoperative optimization  - possible Whipple procedure this week   - Lovenox DVT ppx   - /hr

## 2023-05-20 LAB
CULTURE RESULTS: SIGNIFICANT CHANGE UP
ORGANISM # SPEC MICROSCOPIC CNT: SIGNIFICANT CHANGE UP
SPECIMEN SOURCE: SIGNIFICANT CHANGE UP

## 2023-05-22 ENCOUNTER — NON-APPOINTMENT (OUTPATIENT)
Age: 68
End: 2023-05-22

## 2023-05-22 PROBLEM — K31.89 OTHER DISEASES OF STOMACH AND DUODENUM: Chronic | Status: ACTIVE | Noted: 2023-05-12

## 2023-05-23 LAB — SURGICAL PATHOLOGY STUDY: SIGNIFICANT CHANGE UP

## 2023-05-25 ENCOUNTER — APPOINTMENT (OUTPATIENT)
Dept: SURGICAL ONCOLOGY | Facility: CLINIC | Age: 68
End: 2023-05-25
Payer: MEDICARE

## 2023-05-25 VITALS
SYSTOLIC BLOOD PRESSURE: 129 MMHG | HEIGHT: 60 IN | OXYGEN SATURATION: 96 % | WEIGHT: 120.44 LBS | TEMPERATURE: 97.9 F | BODY MASS INDEX: 23.65 KG/M2 | HEART RATE: 94 BPM | DIASTOLIC BLOOD PRESSURE: 75 MMHG

## 2023-05-25 DIAGNOSIS — K86.89 OTHER SPECIFIED DISEASES OF PANCREAS: ICD-10-CM

## 2023-05-25 PROCEDURE — 99024 POSTOP FOLLOW-UP VISIT: CPT

## 2023-05-26 ENCOUNTER — OUTPATIENT (OUTPATIENT)
Dept: OUTPATIENT SERVICES | Facility: HOSPITAL | Age: 68
LOS: 1 days | Discharge: HOME | End: 2023-05-26

## 2023-05-26 DIAGNOSIS — R79.9 ABNORMAL FINDING OF BLOOD CHEMISTRY, UNSPECIFIED: ICD-10-CM

## 2023-06-02 NOTE — ADDENDUM
[FreeTextEntry1] : Documented by Sondra Arellano acting as scribe for Dr. Mckenzie on 05/25/2023.\par \par All Medical record entries made by the Scribe were at my, Dr. Mckenzie, direction and personally dictated by me on 05/25/2023. I have reviewed the chart and agree that the record accurately reflects my personal performance of the history, physical exam, assessment and plan. I have also personally directed, reviewed, and agreed with the discharge instructions.

## 2023-06-02 NOTE — HISTORY OF PRESENT ILLNESS
[de-identified] : Magalis Douglass is a 68 year old female who presents for a post op visit. She was initially consulted in the hospital for obstructive jaundice/cholangiocarcinoma.\par \par ****SURGERY: s/p Whipple w/ distal gastrectomy, cholecystectomy on 5/15/23\par ****PATHOLOGY: \par Common hepatic lymph node: 3 positive LN for metastatic carcinoma\par Calots lymph node: 1 LN positive for metastatic carcinoma\par Gallbladder: negative for carcinoma\par Whipple: Duodenum with 4.5 cm periampullary adenocarcinoma, intermediately differentiated invading though the duodenal wall into duodenal serosa. Adenocarcinoma is also extending into the gastric pylorus as well as pancreatic head. Negative margins. 6 peripancreatic LN positive for metastatic carcinoma. 1/4 perigastric LN positive for metastatic carcinoma. \par 6 portocaval LN: 1 LN positive for metastatic carcinoma\par SMA LN: 1 positive for Metastatic carcinoma\par Portal LN: 1 positive for metastatic carcinoma \par \par Reports healing well and good energy levels since sx. C/o discomfort and chyle leak in INDER - removed in office today. \par Reports one episode of vomiting yesterday attributing to eating fried foods.\par Denies nausea, constipation, fevers, chills.

## 2023-06-02 NOTE — ASSESSMENT
[FreeTextEntry1] : IMP:\par ****SURGERY: s/p Whipple w/ distal gastrectomy, cholecystectomy on 5/15/23\par ****PATHOLOGY: \par Common hepatic lymph node: 3 positive LN for metastatic carcinoma\par Calots lymph node: 1 LN positive for metastatic carcinoma\par Gallbladder: negative for carcinoma\par Whipple: Duodenum with 4.5 cm periampullary adenocarcinoma, intermediately differentiated invading though the duodenal wall into duodenal serosa. Adenocarcinoma is also extending into the gastric pylorus as well as pancreatic head. Negative margins. 6 peripancreatic LN positive for metastatic carcinoma. 1/4 perigastric LN positive for metastatic carcinoma. \par 6 portocaval LN: 1 LN positive for metastatic carcinoma\par SMA LN: 1 positive for Metastatic carcinoma\par Portal LN: 1 positive for metastatic carcinoma \par \par PLAN:\par 1) Pending Med Onc evaluation for systemic therapy\par 2) Return in 1 month or sooner prn \par 3) Call if symptoms

## 2023-06-07 ENCOUNTER — APPOINTMENT (OUTPATIENT)
Dept: HEMATOLOGY ONCOLOGY | Facility: CLINIC | Age: 68
End: 2023-06-07
Payer: MEDICARE

## 2023-06-07 ENCOUNTER — RESULT REVIEW (OUTPATIENT)
Age: 68
End: 2023-06-07

## 2023-06-07 VITALS
SYSTOLIC BLOOD PRESSURE: 153 MMHG | OXYGEN SATURATION: 98 % | WEIGHT: 122.38 LBS | HEIGHT: 60 IN | TEMPERATURE: 97.6 F | BODY MASS INDEX: 24.03 KG/M2 | DIASTOLIC BLOOD PRESSURE: 80 MMHG | HEART RATE: 96 BPM

## 2023-06-07 DIAGNOSIS — E05.90 THYROTOXICOSIS, UNSPECIFIED W/OUT THYROTOXIC CRISIS OR STORM: ICD-10-CM

## 2023-06-07 LAB
BASOPHILS # BLD AUTO: 0.1 K/UL — SIGNIFICANT CHANGE UP (ref 0–0.2)
BASOPHILS NFR BLD AUTO: 1.1 % — SIGNIFICANT CHANGE UP (ref 0–2)
EOSINOPHIL # BLD AUTO: 0.1 K/UL — SIGNIFICANT CHANGE UP (ref 0–0.5)
EOSINOPHIL NFR BLD AUTO: 0.7 % — SIGNIFICANT CHANGE UP (ref 0–6)
HCT VFR BLD CALC: 29.3 % — LOW (ref 34.5–45)
HGB BLD-MCNC: 9.7 G/DL — LOW (ref 11.5–15.5)
LYMPHOCYTES # BLD AUTO: 1.4 K/UL — SIGNIFICANT CHANGE UP (ref 1–3.3)
LYMPHOCYTES # BLD AUTO: 12 % — LOW (ref 13–44)
MCHC RBC-ENTMCNC: 28.3 PG — SIGNIFICANT CHANGE UP (ref 27–34)
MCHC RBC-ENTMCNC: 33 G/DL — SIGNIFICANT CHANGE UP (ref 32–36)
MCV RBC AUTO: 85.6 FL — SIGNIFICANT CHANGE UP (ref 80–100)
MONOCYTES # BLD AUTO: 0.5 K/UL — SIGNIFICANT CHANGE UP (ref 0–0.9)
MONOCYTES NFR BLD AUTO: 4.5 % — SIGNIFICANT CHANGE UP (ref 2–14)
NEUTROPHILS # BLD AUTO: 9.8 K/UL — HIGH (ref 1.8–7.4)
NEUTROPHILS NFR BLD AUTO: 81.7 % — HIGH (ref 43–77)
PLATELET # BLD AUTO: 288 K/UL — SIGNIFICANT CHANGE UP (ref 150–400)
RBC # BLD: 3.42 M/UL — LOW (ref 3.8–5.2)
RBC # FLD: 16 % — HIGH (ref 10.3–14.5)
WBC # BLD: 12 K/UL — HIGH (ref 3.8–10.5)
WBC # FLD AUTO: 12 K/UL — HIGH (ref 3.8–10.5)

## 2023-06-07 PROCEDURE — 99205 OFFICE O/P NEW HI 60 MIN: CPT

## 2023-06-07 RX ORDER — DOCUSATE SODIUM 100 MG/1
100 CAPSULE ORAL
Qty: 60 | Refills: 1 | Status: ACTIVE | COMMUNITY
Start: 2023-06-07 | End: 1900-01-01

## 2023-06-07 RX ORDER — METOCLOPRAMIDE 5 MG/1
5 TABLET ORAL EVERY 6 HOURS
Qty: 60 | Refills: 2 | Status: ACTIVE | COMMUNITY
Start: 2023-06-07 | End: 1900-01-01

## 2023-06-08 LAB
ALBUMIN SERPL ELPH-MCNC: 3 G/DL
ALP BLD-CCNC: 119 U/L
ALT SERPL-CCNC: 57 U/L
ANION GAP SERPL CALC-SCNC: 15 MMOL/L
APTT BLD: 27.6 SEC
AST SERPL-CCNC: 58 U/L
BILIRUB SERPL-MCNC: 0.5 MG/DL
BUN SERPL-MCNC: 14 MG/DL
CALCIUM SERPL-MCNC: 8.1 MG/DL
CANCER AG19-9 SERPL-ACNC: 345 U/ML
CEA SERPL-MCNC: 3.3 NG/ML
CHLORIDE SERPL-SCNC: 101 MMOL/L
CO2 SERPL-SCNC: 27 MMOL/L
CREAT SERPL-MCNC: 1.35 MG/DL
EGFR: 43 ML/MIN/1.73M2
GLUCOSE SERPL-MCNC: 122 MG/DL
HAV IGM SER QL: NONREACTIVE
HBV CORE IGG+IGM SER QL: NONREACTIVE
HBV CORE IGM SER QL: NONREACTIVE
HBV SURFACE AG SER QL: NONREACTIVE
HCV AB SER QL: NONREACTIVE
HCV S/CO RATIO: 0.08 S/CO
INR PPP: 0.96 RATIO
MAGNESIUM SERPL-MCNC: 1.6 MG/DL
POTASSIUM SERPL-SCNC: 4 MMOL/L
PROT SERPL-MCNC: 5.5 G/DL
PT BLD: 11.3 SEC
SODIUM SERPL-SCNC: 142 MMOL/L

## 2023-06-08 NOTE — REASON FOR VISIT
[Initial Consultation] : an initial consultation [Family Member] : family member [Other: _____] : [unfilled] [FreeTextEntry2] : small bowel adenocarcinoma stage 3

## 2023-06-08 NOTE — PHYSICAL EXAM
[Normal] : affect appropriate [Fully active, able to carry on all pre-disease performance without restriction] : Status 0 - Fully active, able to carry on all pre-disease performance without restriction [de-identified] : LE edema around ankle and foot [de-identified] : incision site healing well no signs of infection. abdominal discomfort near incision site

## 2023-06-08 NOTE — HISTORY OF PRESENT ILLNESS
[T: ___] : T[unfilled] [N: ___] : N[unfilled] [Disease: _____________________] : Disease: [unfilled] [Date: ____________] : Patient's last distress assessment performed on [unfilled]. [0 - No Distress] : Distress Level: 0 [100: Normal, no complaints, no evidence of disease.] : 100: Normal, no complaints, no evidence of disease. [ECOG Performance Status: 0 - Fully active, able to carry on all pre-disease performance without restriction] : Performance Status: 0 - Fully active, able to carry on all pre-disease performance without restriction [de-identified] : 67 yo F 45PY with h/o CAD, hyperlipidemia, hypertension, hyperthyroidism, hypertriglyceridemia and smoking who was f/w metastatic adenocarcinoma s/p Whipple on 5/15/23.\par April 15 2023 phlegm and jaundice abdominal pain described a dullness\par She presented to [facility] ED c/o abdominal pain and jaundice in May 2 2023  Labs showed elevated LFTs including bilirubin of 11. CT A/P showed evidence of biliary obstruction, mural enhancement involving the CBD and mild upper abdominal and left retroperitoneal lymphadenopathy. \par \par MRCP showed circumferential wall thickening and delayed enhancement of the distal common bile duct and ampulla with intra and extrahepatic biliary dilatation and pancreatic ductal dilatation. Mild acute pancreatitis. Pathologically enlarged centrally necrotic portillo hepatis, portacaval, and retroperitoneal lymph nodes. These findings are strongly suspicious for primary cholangiocarcinoma or ampullary cancer. No evidence of metastatic disease to the liver.\par \par EUS showed multiple lymph nodes around the celiac artery with benign pathology report.\par \par Dr. Mckenzie performed a Whipple w/ distal gastrectomy and cholecystectomy on 5/15/23.  Pathology stated duodenum with 4.5 cm periampullary adenocarcinoma, intermediately\par differentiated invading through the duodenal wall into duodenal serosa. Adenocarcinoma is also extending into the gastric pylorus as well as pancreatic head. Negative surgical resection margins (gastric, duodenal, pancreatic, common bile duct, uncinate, vascular groove margin). 6 peripancreatic lymph nodes positive for metastatic carcinoma (6/6). 1 out of 4 perigastric lymph nodes positive for metastatic carcinoma. Completed total of positive LNs was 14 of 17. Multiple foci of lymphovascular permeation into the peripancreatic and omental fat.\par No prior history of cancer.\par \par Patient presents for an initial consultation with daughter Shiela \par Reports cataracts\par Patient hasn’t been able to pass gas at this time\par She is starting to eat more food\par denies foreign travel in the last 5 years\par Has been sedentary since surgery, slowing ambulating more. \par Hasn't smoked since diagnosis\par Reports admission to [facility] with low bp s/p surgery on 5/15/23. \par \par \par [id number]- Daughter Shiela [de-identified] : adenocarcinoma [FreeTextEntry1] : first visit discussion of m FOLFOX 6 [de-identified] : see HPI

## 2023-06-08 NOTE — REVIEW OF SYSTEMS
[Lower Ext Edema] : lower extremity edema [Abdominal Pain] : abdominal pain [Fever] : no fever [Chills] : no chills [Wheezing] : no wheezing [FreeTextEntry2] : negative except as indicated in interval history

## 2023-06-09 ENCOUNTER — RESULT REVIEW (OUTPATIENT)
Age: 68
End: 2023-06-09

## 2023-06-14 LAB — SURGICAL PATHOLOGY STUDY: SIGNIFICANT CHANGE UP

## 2023-06-15 ENCOUNTER — APPOINTMENT (OUTPATIENT)
Dept: SURGICAL ONCOLOGY | Facility: CLINIC | Age: 68
End: 2023-06-15
Payer: MEDICARE

## 2023-06-15 VITALS
DIASTOLIC BLOOD PRESSURE: 64 MMHG | SYSTOLIC BLOOD PRESSURE: 118 MMHG | TEMPERATURE: 98.2 F | HEIGHT: 60 IN | WEIGHT: 114.2 LBS | OXYGEN SATURATION: 96 % | BODY MASS INDEX: 22.42 KG/M2 | HEART RATE: 106 BPM

## 2023-06-15 DIAGNOSIS — K90.9 INTESTINAL MALABSORPTION, UNSPECIFIED: ICD-10-CM

## 2023-06-15 PROCEDURE — 99024 POSTOP FOLLOW-UP VISIT: CPT

## 2023-06-15 RX ORDER — PANCRELIPASE 36000; 180000; 114000 [USP'U]/1; [USP'U]/1; [USP'U]/1
36000-114000 CAPSULE, DELAYED RELEASE PELLETS ORAL
Qty: 150 | Refills: 3 | Status: ACTIVE | COMMUNITY
Start: 2023-06-15 | End: 1900-01-01

## 2023-06-16 ENCOUNTER — APPOINTMENT (OUTPATIENT)
Dept: FAMILY MEDICINE | Facility: CLINIC | Age: 68
End: 2023-06-16
Payer: MEDICARE

## 2023-06-16 VITALS
WEIGHT: 110 LBS | BODY MASS INDEX: 21.6 KG/M2 | HEIGHT: 60 IN | SYSTOLIC BLOOD PRESSURE: 124 MMHG | OXYGEN SATURATION: 98 % | HEART RATE: 80 BPM | DIASTOLIC BLOOD PRESSURE: 58 MMHG | TEMPERATURE: 98 F

## 2023-06-16 DIAGNOSIS — F17.200 NICOTINE DEPENDENCE, UNSPECIFIED, UNCOMPLICATED: ICD-10-CM

## 2023-06-16 DIAGNOSIS — D64.9 ANEMIA, UNSPECIFIED: ICD-10-CM

## 2023-06-16 DIAGNOSIS — R73.03 PREDIABETES.: ICD-10-CM

## 2023-06-16 DIAGNOSIS — I10 ESSENTIAL (PRIMARY) HYPERTENSION: ICD-10-CM

## 2023-06-16 DIAGNOSIS — C17.0 MALIGNANT NEOPLASM OF DUODENUM: ICD-10-CM

## 2023-06-16 DIAGNOSIS — F17.210 NICOTINE DEPENDENCE, CIGARETTES, UNCOMPLICATED: ICD-10-CM

## 2023-06-16 PROCEDURE — 82962 GLUCOSE BLOOD TEST: CPT

## 2023-06-16 PROCEDURE — 99214 OFFICE O/P EST MOD 30 MIN: CPT | Mod: 25

## 2023-06-17 LAB
ALBUMIN SERPL ELPH-MCNC: 3.1 G/DL
ALP BLD-CCNC: 117 U/L
ALT SERPL-CCNC: 18 U/L
ANION GAP SERPL CALC-SCNC: 15 MMOL/L
AST SERPL-CCNC: 19 U/L
BILIRUB SERPL-MCNC: 0.4 MG/DL
BUN SERPL-MCNC: 19 MG/DL
CALCIUM SERPL-MCNC: 8 MG/DL
CHLORIDE SERPL-SCNC: 99 MMOL/L
CO2 SERPL-SCNC: 26 MMOL/L
CREAT SERPL-MCNC: 1.25 MG/DL
EGFR: 47 ML/MIN/1.73M2
ESTIMATED AVERAGE GLUCOSE: 123 MG/DL
FERRITIN SERPL-MCNC: 380 NG/ML
FOLATE SERPL-MCNC: 7.4 NG/ML
GLUCOSE SERPL-MCNC: 139 MG/DL
HBA1C MFR BLD HPLC: 5.9 %
IRON SATN MFR SERPL: 21 %
IRON SERPL-MCNC: 30 UG/DL
MAGNESIUM SERPL-MCNC: 1.5 MG/DL
POTASSIUM SERPL-SCNC: 3.4 MMOL/L
PROT SERPL-MCNC: 5.7 G/DL
SODIUM SERPL-SCNC: 140 MMOL/L
TIBC SERPL-MCNC: 144 UG/DL
TSH SERPL-ACNC: 1.64 UIU/ML
UIBC SERPL-MCNC: 115 UG/DL
VIT B12 SERPL-MCNC: 1210 PG/ML

## 2023-06-22 PROBLEM — I10 HYPERTENSION: Status: ACTIVE | Noted: 2018-07-18

## 2023-06-22 PROBLEM — F17.210 SMOKES WITH GREATER THAN 30 PACK YEAR HISTORY: Status: RESOLVED | Noted: 2019-11-12 | Resolved: 2023-06-22

## 2023-06-22 PROBLEM — C17.0 DUODENAL ADENOCARCINOMA: Status: ACTIVE | Noted: 2023-06-02

## 2023-06-22 PROBLEM — F17.200 CURRENT EVERY DAY SMOKER: Status: RESOLVED | Noted: 2018-07-18 | Resolved: 2023-06-22

## 2023-06-22 LAB — GLUCOSE BLDC GLUCOMTR-MCNC: 152

## 2023-06-22 NOTE — HISTORY OF PRESENT ILLNESS
[Family Member] : family member [FreeTextEntry1] : Follow up [de-identified] : \par 68 year old female presents alongside her daughter for a follow up\par s/p Whipple w/ distal gastrectomy, cholecystectomy on 5/15/23 for metastatic adenocarcinoma \par she has dumping syndrome\par having difficulty eating and maintaining her nutrition\par she has had a few episodes where she has gotten the chills and has been shaking, her teeth would chatter\par symptoms resolved after drinking a clear Ensure \par she has been following with oncology, surgical oncology\par had concern for her sugars \par also recently anemic\par

## 2023-06-22 NOTE — ASSESSMENT
[FreeTextEntry1] : Pre-diabetes\par - POCT glucose stable\par - will check blood work, reassess \par \par Anemia\par - c/w iron supplementation\par - following with hematology/oncology\par - check blood work\par \par HTN\par - stable\par - c/w Losartan- HCTZ\par \par Metastatic adenocarcinoma\par - s/p recent surgery\par - following with oncology and surgical oncology\par \par discussed current symptoms- likely secondary from nutritional changes associated from recent surgery/dumping syndrome, patient will be focusing on improving her nutrition, c/w Ensure supplementation

## 2023-06-22 NOTE — PHYSICAL EXAM
[No Acute Distress] : no acute distress [Alert and Oriented x3] : oriented to person, place, and time [Normal Appearance] : was normal in appearance [Neck Supple] : was supple [No Respiratory Distress] : no respiratory distress  [Clear to Auscultation] : lungs were clear to auscultation bilaterally [Normal Rate] : normal rate  [Regular Rhythm] : with a regular rhythm [Normal S1, S2] : normal S1 and S2 [No Murmur] : no murmur heard [No Edema] : there was no peripheral edema [Normal Insight/Judgement] : insight and judgment were intact

## 2023-06-26 NOTE — HISTORY OF PRESENT ILLNESS
[de-identified] : Magalis Douglass is a 68 year old female who presents for a post op visit. She was initially consulted in the hospital for obstructive jaundice/cholangiocarcinoma.\par \par ****SURGERY: s/p Whipple w/ distal gastrectomy, cholecystectomy on 5/15/23\par ****PATHOLOGY: \par Common hepatic lymph node: 3 positive LN for metastatic carcinoma\par Calots lymph node: 1 LN positive for metastatic carcinoma\par Gallbladder: negative for carcinoma\par Whipple: Duodenum with 4.5 cm periampullary adenocarcinoma, intermediately differentiated invading though the duodenal wall into duodenal serosa. Adenocarcinoma is also extending into the gastric pylorus as well as pancreatic head. Negative margins. 6 peripancreatic LN positive for metastatic carcinoma. 1/4 perigastric LN positive for metastatic carcinoma. \par 6 portocaval LN: 1 LN positive for metastatic carcinoma\par SMA LN: 1 positive for Metastatic carcinoma\par Portal LN: 1 positive for metastatic carcinoma \par \par 5/25/23- Reports healing well and good energy levels since sx. C/o discomfort and chyle leak in INDER - removed in office today. \par Reports one episode of vomiting yesterday attributing to eating fried foods.\par Denies nausea, constipation, fevers, chills.\par \par 6/15/23- In the interim, she was hospitalized at [facility] with excessive diarrhea, lethargy and hypotension.  C-diff negative.  Told she had dumping syndrome.  She is pleased because diarrhea has improved and today she had a formed bowel movement.  She has been avoiding high sugar foods.   She is on PPI once per day and will increased to BID as instructed.  She seems to be eating well and tolerates small frequent meals.  She denies any nausea/vomiting.  Denies fever or chills.  She saw Dr. Sprague on 6/7/23 who has recommended mFOLFOX.  She is concerned about neuropathy related to therapy so she plans to seek a second opinion at [facility]. Intermittently experiences whole body shaking with no changes in HR or BP, with elevated glucose 122 on 6/8/23.

## 2023-06-26 NOTE — ADDENDUM
[FreeTextEntry1] : Documented by Snodra Arellano acting as scribe for Dr. Mckenzie on 06/15/2023.\par \par All Medical record entries made by the Scribe were at my, Dr. Mckenzie, direction and personally dictated by me on 06/15/2023. I have reviewed the chart and agree that the record accurately reflects my personal performance of the history, physical exam, assessment and plan. I have also personally directed, reviewed, and agreed with the discharge instructions.

## 2023-06-26 NOTE — REASON FOR VISIT
[Post-Op] : a post-op for [FreeTextEntry2] : s/p Whipple w/ distal gastrectomy, cholecystectomy on 5/15/23

## 2023-06-26 NOTE — ASSESSMENT
[FreeTextEntry1] : IMP:\par ****SURGERY: s/p Whipple w/ distal gastrectomy, cholecystectomy on 5/15/23\par ****PATHOLOGY: \par Common hepatic lymph node: 3 positive LN for metastatic carcinoma\par Calots lymph node: 1 LN positive for metastatic carcinoma\par Gallbladder: negative for carcinoma\par Whipple: Duodenum with 4.5 cm periampullary adenocarcinoma, intermediately differentiated invading though the duodenal wall into duodenal serosa. Adenocarcinoma is also extending into the gastric pylorus as well as pancreatic head. Negative margins. 6 peripancreatic LN positive for metastatic carcinoma. 1/4 perigastric LN positive for metastatic carcinoma. \par 6 portocaval LN: 1 LN positive for metastatic carcinoma\par SMA LN: 1 positive for Metastatic carcinoma\par Portal LN: 1 positive for metastatic carcinoma \par \par ****PATHOLOGY: Product of Whipple 6/9/23 \par Duodenum with invasive moderately differentiated adenocarcinoma. One lymph node positive  for carcinoma (1/1)\par \par PLAN:\par 1) Pending second opinion at [facility] evaluation for systemic therapy\par 2) Follow up with PCP, for elevated blood glucose \par 3) Trial of Creon with meals \par Return in 1 month or sooner prn \par \par \par

## 2023-06-28 ENCOUNTER — APPOINTMENT (OUTPATIENT)
Dept: HEMATOLOGY ONCOLOGY | Facility: CLINIC | Age: 68
End: 2023-06-28

## 2023-07-05 ENCOUNTER — TRANSCRIPTION ENCOUNTER (OUTPATIENT)
Age: 68
End: 2023-07-05

## 2023-07-06 ENCOUNTER — TRANSCRIPTION ENCOUNTER (OUTPATIENT)
Age: 68
End: 2023-07-06

## 2023-07-10 ENCOUNTER — TRANSCRIPTION ENCOUNTER (OUTPATIENT)
Age: 68
End: 2023-07-10

## 2023-07-10 DIAGNOSIS — F41.9 ANXIETY DISORDER, UNSPECIFIED: ICD-10-CM

## 2023-07-10 RX ORDER — ALPRAZOLAM 0.5 MG/1
0.5 TABLET ORAL
Qty: 10 | Refills: 0 | Status: ACTIVE | COMMUNITY
Start: 2023-07-10 | End: 1900-01-01

## 2023-07-17 ENCOUNTER — NON-APPOINTMENT (OUTPATIENT)
Age: 68
End: 2023-07-17

## 2023-08-17 ENCOUNTER — APPOINTMENT (OUTPATIENT)
Dept: SURGICAL ONCOLOGY | Facility: CLINIC | Age: 68
End: 2023-08-17

## 2023-08-21 ENCOUNTER — NON-APPOINTMENT (OUTPATIENT)
Age: 68
End: 2023-08-21

## 2023-08-24 ENCOUNTER — NON-APPOINTMENT (OUTPATIENT)
Age: 68
End: 2023-08-24

## 2023-09-05 ENCOUNTER — RX RENEWAL (OUTPATIENT)
Age: 68
End: 2023-09-05

## 2023-09-05 ENCOUNTER — NON-APPOINTMENT (OUTPATIENT)
Age: 68
End: 2023-09-05

## 2023-09-06 RX ORDER — LOSARTAN POTASSIUM AND HYDROCHLOROTHIAZIDE 12.5; 1 MG/1; MG/1
100-12.5 TABLET ORAL
Qty: 90 | Refills: 0 | Status: ACTIVE | COMMUNITY
Start: 2019-08-22 | End: 1900-01-01

## 2023-09-11 NOTE — HEALTH RISK ASSESSMENT
Good news.  Your mammogram came back normal. [2] : 2 [Current] : Current [Yes] : Yes [Monthly or less (1 pt)] : Monthly or less (1 point) [1 or 2 (0 pts)] : 1 or 2 (0 points) [Never (0 pts)] : Never (0 points) [No] : In the past 12 months have you used drugs other than those required for medical reasons? No [No falls in past year] : Patient reported no falls in the past year [0] : 2) Feeling down, depressed, or hopeless: Not at all (0) [PHQ-2 Negative - No further assessment needed] : PHQ-2 Negative - No further assessment needed [With Family] : lives with family [Employed] : employed [] :  [Fully functional (bathing, dressing, toileting, transferring, walking, feeding)] : Fully functional (bathing, dressing, toileting, transferring, walking, feeding) [Fully functional (using the telephone, shopping, preparing meals, housekeeping, doing laundry, using] : Fully functional and needs no help or supervision to perform IADLs (using the telephone, shopping, preparing meals, housekeeping, doing laundry, using transportation, managing medications and managing finances) [Reports normal functional visual acuity (ie: able to read med bottle)] : Reports normal functional visual acuity [Smoke Detector] : smoke detector [Carbon Monoxide Detector] : carbon monoxide detector [Seat Belt] :  uses seat belt [Sunscreen] : uses sunscreen [With Patient/Caregiver] : , with patient/caregiver [de-identified] : rare alcohol use [Audit-CScore] : 1 [de-identified] : Active, walks [de-identified] : Diet has improved [[id number]] : 0 [LowDoseCTScan] : 06/2022 [Change in mental status noted] : No change in mental status noted [Reports changes in hearing] : Reports no changes in hearing [Reports changes in vision] : Reports no changes in vision [MammogramDate] : 06/2022 [BoneDensityDate] : 11/2020 [BoneDensityComments] : Osteoporosis  [ColonoscopyDate] : 12/2020 [ColonoscopyComments] : Diverticulosis, Internal hemorrhoids [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

## 2023-09-25 NOTE — CDI QUERY NOTE - NSCDI_DOCCLARIFY2_GEN_ALL_CORE_FT_PREVIEWDISPLAY
In responding to this request, please exercise your independent professional judgment. The fact that a question is asked does not imply that any particular answer is desired or expected. Documentation clarification is required for compliance.   This form is NOT a part of the permanent Medical Record.

## 2023-09-25 NOTE — CDI QUERY NOTE - NSCDIOTHERTXTBX2_GEN_ALL_CORE_FT
Further Clarification is needed on obstructive jaundice as current diagnosis or history of diagnosis.    Patient presents to the hospital with History and physical noting patient had a recent admission for obstructive jaundice along with ER provider noting on admission in his physical exam that patient did not have jaundice.  However, on 5/15/23 in SICU notes provider notes patient had admitting diagnosis of obstructive jaundice.      68F with recent admission for metastatic adenocarcinoma, malignant SBO and obstructive jaundice with internal/external drain placement by IR. She was ultimately discharged with plan for her to improve her nutritional status and return for a Whipple. She is now returning with ~2d of PO intolerance and abdominal pain/distension. She reports occasional abdominal spasms.  ER provider noting on admission that patient was not jaundice along with admission History and physical noting patient with history of recent admission for obstructive juandice; however, the 5/15/23 note is noting an admission diagnosis of obstructive jaundice.    Please clarify, if possible, if the "obstructive jaundice" documented can be further clarified as:    -history of obstructive juandice (recent admission)  -current diagnosis this admission of obstructive jaundice  -other please clarify     Chart Documentation Location:    5/12/23 ER Provider Assessment:  · Physical Examination: Constitutional: Awake, alert, in no acute distress  	Eyes: no scleral icterus  	HENT: normocephalic, atraumatic, +dry oral mucosa  	Neck: supple  	CV: RRR, no murmur  	Pulm: non-labored respirations, CTAB  	Abdomen: soft, non-tender, non-distended  	Extremities: no edema, no deformity  	Skin: no rash, no jaundice                Neuro: AAOx3, moving all extremities equally      5/12/23 - Admission History and Physical  History of Present Illness:   68F with recent admission for metastatic adenocarcinoma, malignant SBO and obstructive jaundice with internal/external drain placement by IR. She was ultimately discharged with plan for her to improve her nutritional status and return for a Whipple. She is now returning with ~2d of PO intolerance and abdominal pain/distension. She reports occasional abdominal spasms.       5/15/2023 - SICU TRANSFER NOTE  -----------------------------  ICU Admission Date: 5/15/23  Transfer Date: 05-17-23 @ 14:25  Admission Diagnosis: metastatic adenocarcinoma, malignant SBO/obstructive jaundice with planned Whipple  Active Problems/injuries: S/p Pancreaticoduodenectomy with distal gastrectomy. metastatic adenocarcinoma  Procedures: Pancreaticoduodenectomy with distal gastrectomy 5/15    5/19/23 Discharge Summary:  69yo F w/ with duodenal mass presented on 5/12 with chief complaint of PO intolerance. Pt had been previously admitted and had an internal-external biliary drain placed. She returned to the ER with several days of nausea/vomiting and inability to tolerate PO. An NGT was placed and she was scheduled for a whipple procedure. She underwent non pylorus sparing whipple on 5/15. The procedure was uncomplicated and she was admitted to he ICU for management. Epidural and tap blocks were performed intra-op for pain control. Two michelle drains were placed as well as a prevana wound vac. Alatorre remained in place until POD1. She was started on sips of clear liquids. NGT was removed. She tolerated liquids and her diet was advanced as tolerated. Drain amylases were normal. Her Hgb remained stable. She was on zosyn postoperatively and it was discontinued on POD3. She was out of bed and pain was controlled. On POD4 she was discharged home in stable condition with one bulb drain and plan to follow up with Dr. Mckenzie in the outpatient setting.       5/15/23 pathology finding:  Specimen(s) Submitted  1  Common hepatic artery lymph node  2  Calot's lymph node  3  Gallbladder  4  Common bile duct tube  5  Product of whipple  6  Portocaval lymph node  7  SMA lymph node  8  Portal lymph node      Final Diagnosis  1  Common hepatic artery lymph node:  -Three lymph nodes positive for metastatic carcinoma (3/3).  2  Calot's lymph node:  -One lymph node positive for metastatic carcinoma (1/1).  3  Gallbladder:  - Gallbladder, negative for carcinoma  4  Common bile duct tube:  - Stent/tube.  Gross only diagnosis.  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  -Adenocarcinoma is also extending into the gastric pylorus as well as  pancreatic head.  - Negative surgical resection margins (gastric, duodenal, pancreatic,  common bile duct, uncinate, vascular groove margin).  - 6 peripancreatic lymph nodes positive for metastatic carcinoma (6/6).  - 1 out of 4 perigastric lymph nodes positive for metastatic carcinoma  (1/4).  - Multiple foci of lymphovascular permeation into the peripancreatic and  omental fat.  6  Portocaval lymph node:  - 1 lymph node positive for metastatic carcinoma (1/1).  7  SMA lymph node:  - 1 lymph node positive for metastatic carcinoma (1/1).  8  Portal lymph node:  - 1 lymph node positive for metastatic carcinoma (1/1).

## 2023-09-25 NOTE — CDI QUERY NOTE - NSCDIOTHERTXTBX3_GEN_ALL_CORE_FT
Further clarification is required regarding pathology findings.      Due to timing of when Pathology findings become available to providers (often after discharge), further clarification is needed regarding the pathology findings.    69yo F w/ with duodenal mass presented on 5/12 with chief complaint of PO intolerance. Pt had been previously admitted and had an internal-external biliary drain placed. She returned to the ER with several days of nausea/vomiting and inability to tolerate PO. An NGT was placed and she was scheduled for a whipple procedure. She underwent non pylorus sparing whipple on 5/15. 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 Pt had been previously admitted and had an internal-external biliary drain placed. She returned to the ER with several days of nausea/vomiting and inability to tolerate PO. An NGT was placed and she was scheduled for a whipple procedure. She underwent non pylorus sparing whipple on 5/15. 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  2  Calot's lymph node:  -One lymph node positive for metastatic carcinoma (1/1).  3  Gallbladder:  - Gallbladder, negative for carcinoma  4  Common bile duct tube:  - Stent/tube.  Gross only diagnosis.  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  -Adenocarcinoma is also extending into the gastric pylorus as well as  pancreatic head.  - Negative surgical resection margins (gastric, duodenal, pancreatic,  common bile duct, uncinate, vascular groove margin).  - 6 peripancreatic lymph nodes positive for metastatic carcinoma (6/6).  - 1 out of 4 perigastric lymph nodes positive for metastatic carcinoma  (1/4).  - Multiple foci of lymphovascular permeation into the peripancreatic and  omental fat.  6  Portocaval lymph node:  - 1 lymph node positive for metastatic carcinoma (1/1).  7  SMA lymph node:  - 1 lymph node positive for metastatic carcinoma (1/1).  8  Portal lymph node:  - 1 lymph node positive for metastatic carcinoma (1/1).

## 2023-09-25 NOTE — CDI QUERY NOTE - NSCDIOTHERTXTBX_GEN_ALL_CORE_HH
Further Clarification is needed on "Suggests distributive shock"     This patient has documentation stating patient with widened pulse pressure, "suggests distributive shock" more likely noted in the 5/16/23 SICU progress note; however, further clarification is needed to determine at time of discharge if this condition was ruled out, ruled in, or remained a possible differential diagnosis at time of discharge.    67yo F w/ with duodenal mass presented on 5/12 with chief complaint of PO intolerance. Pt had been previously admitted and had an internal-external biliary drain placed. She returned to the ER with several days of nausea/vomiting and inability to tolerate PO. An NGT was placed and she was scheduled for a whipple procedure. She underwent non pylorus sparing whipple on 5/15. Two michelle drains were placed as well as a prevana wound vac.  Her Hgb remained stable. SICU provider notes on 5/16/23 that patient MAP's dropped into the 50's with widened pulse pressure "suggests distributive shock" and patient was placed on a norepinephrine titration drip keep MAP's greater than 65-70.        Can you please clarify, if possible after further study, evaluation, and treatment if “Suggests Distributive Shock" can be further clarified as:    -  Distributive shock was Ruled Out  -  Distributive shock was Ruled in (remained a differential diagnosis at discharge)  - Other Please Specify  - Not Clinically Significant    Chart Documentation Location:    5/16/23 SICU Progress note:  Cardiovascular: Pt returned from the OR and became hypotensive with MAPs in the 50s .  POCUS: IVC poorly visulaized.  Heart: Good gross systolic function, No pericardial effusion, No R Heart Dilation. Widened pulse pressure suggests distributive shock more likely vs hypovolemia.  Pt started on levophed and Pt received 1 L IVF Bolus with POCUS evaluation demonstrating a collapsed IVC.  Received additional Albumin bolus with dripping MAP and again off pressors.  Pt with cleared lactic.        Discharge Summary:  67yo F w/ with duodenal mass presented on 5/12 with chief complaint of PO intolerance. Pt had been previously admitted and had an internal-external biliary drain placed. She returned to the ER with several days of nausea/vomiting and inability to tolerate PO. An NGT was placed and she was scheduled for a whipple procedure. She underwent non pylorus sparing whipple on 5/15. The procedure was uncomplicated and she was admitted to he ICU for management. Epidural and tap blocks were performed intra-op for pain control. Two michelle drains were placed as well as a prevana wound vac. Alatorre remained in place until POD1. She was started on sips of clear liquids. NGT was removed. She tolerated liquids and her diet was advanced as tolerated. Drain amylases were normal. Her Hgb remained stable. She was on zosyn postoperatively and it was discontinued on POD3. She was out of bed and pain was controlled. On POD4 she was discharged home in stable condition with one bulb drain and plan to follow up with Dr. Mckenzie in the outpatient setting.       Medication:  NS bolus 1L x2   Albumin 5% IVPB  Noreopinephrine IV started and titrated (titrate to Keep map >65-70)

## 2023-09-26 NOTE — CHART NOTE - NSCHARTNOTEFT_GEN_A_CORE
Distributive Shock.     Further Clarification is needed on "Suggests distributive shock"    CDI Clarification 1:  Distributive Shock    67yo F w/ with duodenal mass presented on 5/12 with chief complaint of PO intolerance. Pt had been previously admitted and had an internal-external biliary drain placed. She returned to the ER with several days of nausea/vomiting and inability to tolerate PO. An NGT was placed and she was scheduled for a whipple procedure. She underwent non pylorus sparing whipple on 5/15. Two michelle drains were placed as well as a prevana wound vac.  Her Hgb remained stable. SICU Consulted post op for continued co management of needs which included  a norepinephrine titration drip keep MAP's greater than 65-70.  Distributive shock diagnosis Not Clinically Significant           CDI Clarification 2:  obstructive Jaundice.       68F with recent admission for metastatic adenocarcinoma, malignant SBO and obstructive jaundice with internal/external drain placement by IR. She was ultimately discharged with plan for her to improve her nutritional status and return for a Whipple. She returned with ~2d of PO intolerance and abdominal pain/distension. Patient with history of obstructive jaundice treated with biliary drain placement.              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     Further Clarification is needed on "Suggests distributive shock"    CDI Clarification 1:  Distributive Shock    67yo F w/ with duodenal mass presented on 5/12 with chief complaint of PO intolerance. Pt had been previously admitted and had an internal-external biliary drain placed. She returned to the ER with several days of nausea/vomiting and inability to tolerate PO. An NGT was placed and she was scheduled for a whipple procedure. She underwent non pylorus sparing whipple on 5/15. Two michelle drains were placed as well as a prevana wound vac.  Her Hgb remained stable. SICU Consulted post op for continued co management of needs which included  a norepinephrine titration drip keep MAP's greater than 65-70 due to differential diagnosis of Distributive shock being ruled in. Patient resuscitated and treated appropriately with continued monitoring in the ICU setting. Once treated and resolved patient downgraded.            CDI Clarification 2:  obstructive Jaundice.       68F with recent admission for metastatic adenocarcinoma, malignant SBO and obstructive jaundice with internal/external drain placement by IR. She was ultimately discharged with plan for her to improve her nutritional status and return for a Whipple. She returned with ~2d of PO intolerance and abdominal pain/distension. Patient with history of obstructive jaundice treated with biliary drain placement.              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

## 2023-10-01 PROBLEM — K86.89 PANCREATIC MASS: Status: ACTIVE | Noted: 2023-06-02

## 2024-03-29 ENCOUNTER — TRANSCRIPTION ENCOUNTER (OUTPATIENT)
Age: 69
End: 2024-03-29
